# Patient Record
Sex: FEMALE | Race: OTHER | NOT HISPANIC OR LATINO | URBAN - METROPOLITAN AREA
[De-identification: names, ages, dates, MRNs, and addresses within clinical notes are randomized per-mention and may not be internally consistent; named-entity substitution may affect disease eponyms.]

---

## 2017-10-23 ENCOUNTER — EMERGENCY (EMERGENCY)
Facility: HOSPITAL | Age: 82
LOS: 1 days | Discharge: HOME | End: 2017-10-23
Admitting: INTERNAL MEDICINE
Payer: MEDICARE

## 2017-10-23 DIAGNOSIS — E86.0 DEHYDRATION: ICD-10-CM

## 2017-10-23 DIAGNOSIS — I10 ESSENTIAL (PRIMARY) HYPERTENSION: ICD-10-CM

## 2017-10-23 DIAGNOSIS — R19.7 DIARRHEA, UNSPECIFIED: ICD-10-CM

## 2017-10-23 DIAGNOSIS — N39.0 URINARY TRACT INFECTION, SITE NOT SPECIFIED: ICD-10-CM

## 2017-10-23 PROBLEM — Z00.00 ENCOUNTER FOR PREVENTIVE HEALTH EXAMINATION: Status: ACTIVE | Noted: 2017-10-23

## 2017-10-23 PROCEDURE — 74000: CPT | Mod: 26

## 2017-10-23 PROCEDURE — 99285 EMERGENCY DEPT VISIT HI MDM: CPT

## 2017-10-23 PROCEDURE — 93010 ELECTROCARDIOGRAM REPORT: CPT

## 2017-10-24 PROCEDURE — 96365 THER/PROPH/DIAG IV INF INIT: CPT

## 2017-10-24 PROCEDURE — 81003 URINALYSIS AUTO W/O SCOPE: CPT

## 2017-10-24 PROCEDURE — 85730 THROMBOPLASTIN TIME PARTIAL: CPT

## 2017-10-24 PROCEDURE — 99284 EMERGENCY DEPT VISIT MOD MDM: CPT | Mod: 25

## 2017-10-24 PROCEDURE — 85610 PROTHROMBIN TIME: CPT

## 2017-10-24 PROCEDURE — 80076 HEPATIC FUNCTION PANEL: CPT

## 2017-10-24 PROCEDURE — 87086 URINE CULTURE/COLONY COUNT: CPT

## 2017-10-24 PROCEDURE — 96367 TX/PROPH/DG ADDL SEQ IV INF: CPT

## 2017-10-24 PROCEDURE — 85027 COMPLETE CBC AUTOMATED: CPT

## 2017-10-24 PROCEDURE — 84484 ASSAY OF TROPONIN QUANT: CPT

## 2017-10-24 PROCEDURE — 82550 ASSAY OF CK (CPK): CPT

## 2017-10-24 PROCEDURE — 74018 RADEX ABDOMEN 1 VIEW: CPT

## 2017-10-24 PROCEDURE — 96361 HYDRATE IV INFUSION ADD-ON: CPT

## 2017-10-24 PROCEDURE — 83690 ASSAY OF LIPASE: CPT

## 2017-10-24 PROCEDURE — 82553 CREATINE MB FRACTION: CPT

## 2017-10-24 PROCEDURE — 80048 BASIC METABOLIC PNL TOTAL CA: CPT

## 2017-10-24 PROCEDURE — 93005 ELECTROCARDIOGRAM TRACING: CPT

## 2017-10-24 PROCEDURE — 82150 ASSAY OF AMYLASE: CPT

## 2017-10-27 ENCOUNTER — EMERGENCY (EMERGENCY)
Facility: HOSPITAL | Age: 82
LOS: 1 days | Discharge: HOME | End: 2017-10-27
Admitting: EMERGENCY MEDICINE
Payer: MEDICARE

## 2017-10-27 DIAGNOSIS — K52.9 NONINFECTIVE GASTROENTERITIS AND COLITIS, UNSPECIFIED: ICD-10-CM

## 2017-10-27 DIAGNOSIS — I10 ESSENTIAL (PRIMARY) HYPERTENSION: ICD-10-CM

## 2017-10-27 DIAGNOSIS — R19.7 DIARRHEA, UNSPECIFIED: ICD-10-CM

## 2017-10-27 PROCEDURE — 74177 CT ABD & PELVIS W/CONTRAST: CPT | Mod: 26

## 2017-10-27 PROCEDURE — 99284 EMERGENCY DEPT VISIT MOD MDM: CPT

## 2017-10-28 PROCEDURE — 85027 COMPLETE CBC AUTOMATED: CPT

## 2017-10-28 PROCEDURE — 99284 EMERGENCY DEPT VISIT MOD MDM: CPT | Mod: 25

## 2017-10-28 PROCEDURE — 80053 COMPREHEN METABOLIC PANEL: CPT

## 2017-10-28 PROCEDURE — 81003 URINALYSIS AUTO W/O SCOPE: CPT

## 2017-10-28 PROCEDURE — 96361 HYDRATE IV INFUSION ADD-ON: CPT

## 2017-10-28 PROCEDURE — 96365 THER/PROPH/DIAG IV INF INIT: CPT | Mod: XU

## 2017-10-28 PROCEDURE — 74177 CT ABD & PELVIS W/CONTRAST: CPT

## 2017-11-13 ENCOUNTER — EMERGENCY (EMERGENCY)
Facility: HOSPITAL | Age: 82
LOS: 1 days | Discharge: HOME | End: 2017-11-13
Admitting: EMERGENCY MEDICINE
Payer: MEDICARE

## 2017-11-13 DIAGNOSIS — R19.7 DIARRHEA, UNSPECIFIED: ICD-10-CM

## 2017-11-13 DIAGNOSIS — K64.9 UNSPECIFIED HEMORRHOIDS: ICD-10-CM

## 2017-11-13 DIAGNOSIS — I10 ESSENTIAL (PRIMARY) HYPERTENSION: ICD-10-CM

## 2017-11-13 DIAGNOSIS — R53.1 WEAKNESS: ICD-10-CM

## 2017-11-13 DIAGNOSIS — Z79.82 LONG TERM (CURRENT) USE OF ASPIRIN: ICD-10-CM

## 2017-11-13 DIAGNOSIS — Z79.899 OTHER LONG TERM (CURRENT) DRUG THERAPY: ICD-10-CM

## 2017-11-13 PROCEDURE — 82150 ASSAY OF AMYLASE: CPT

## 2017-11-13 PROCEDURE — 71045 X-RAY EXAM CHEST 1 VIEW: CPT

## 2017-11-13 PROCEDURE — 85610 PROTHROMBIN TIME: CPT

## 2017-11-13 PROCEDURE — 96360 HYDRATION IV INFUSION INIT: CPT

## 2017-11-13 PROCEDURE — 71010: CPT | Mod: 26

## 2017-11-13 PROCEDURE — 82272 OCCULT BLD FECES 1-3 TESTS: CPT

## 2017-11-13 PROCEDURE — 74020: CPT

## 2017-11-13 PROCEDURE — 83690 ASSAY OF LIPASE: CPT

## 2017-11-13 PROCEDURE — 85027 COMPLETE CBC AUTOMATED: CPT

## 2017-11-13 PROCEDURE — 85730 THROMBOPLASTIN TIME PARTIAL: CPT

## 2017-11-13 PROCEDURE — 74020: CPT | Mod: 26

## 2017-11-13 PROCEDURE — 99284 EMERGENCY DEPT VISIT MOD MDM: CPT | Mod: 25

## 2017-11-13 PROCEDURE — 99284 EMERGENCY DEPT VISIT MOD MDM: CPT

## 2017-11-13 PROCEDURE — 80053 COMPREHEN METABOLIC PANEL: CPT

## 2017-11-17 ENCOUNTER — INPATIENT (INPATIENT)
Facility: HOSPITAL | Age: 82
LOS: 8 days | Discharge: HOME | DRG: 372 | End: 2017-11-26
Attending: INTERNAL MEDICINE | Admitting: INTERNAL MEDICINE
Payer: MEDICARE

## 2017-11-17 VITALS
OXYGEN SATURATION: 98 % | HEART RATE: 86 BPM | SYSTOLIC BLOOD PRESSURE: 149 MMHG | RESPIRATION RATE: 18 BRPM | DIASTOLIC BLOOD PRESSURE: 67 MMHG | TEMPERATURE: 99 F

## 2017-11-17 DIAGNOSIS — K52.9 NONINFECTIVE GASTROENTERITIS AND COLITIS, UNSPECIFIED: ICD-10-CM

## 2017-11-17 DIAGNOSIS — I10 ESSENTIAL (PRIMARY) HYPERTENSION: ICD-10-CM

## 2017-11-17 DIAGNOSIS — E78.5 HYPERLIPIDEMIA, UNSPECIFIED: ICD-10-CM

## 2017-11-17 DIAGNOSIS — Z29.9 ENCOUNTER FOR PROPHYLACTIC MEASURES, UNSPECIFIED: ICD-10-CM

## 2017-11-17 DIAGNOSIS — E87.6 HYPOKALEMIA: ICD-10-CM

## 2017-11-17 LAB
ALBUMIN SERPL ELPH-MCNC: 2.6 G/DL — LOW (ref 3.5–5)
ALP SERPL-CCNC: 46 U/L — SIGNIFICANT CHANGE UP (ref 40–120)
ALT FLD-CCNC: 13 U/L DA — SIGNIFICANT CHANGE UP (ref 10–60)
ANION GAP SERPL CALC-SCNC: 11 MMOL/L — SIGNIFICANT CHANGE UP (ref 5–17)
APTT BLD: 25.9 SEC — LOW (ref 27.5–37.4)
AST SERPL-CCNC: 15 U/L — SIGNIFICANT CHANGE UP (ref 10–40)
BASOPHILS # BLD AUTO: 0.1 K/UL — SIGNIFICANT CHANGE UP (ref 0–0.2)
BASOPHILS NFR BLD AUTO: 0.9 % — SIGNIFICANT CHANGE UP (ref 0–2)
BILIRUB SERPL-MCNC: 0.8 MG/DL — SIGNIFICANT CHANGE UP (ref 0.2–1.2)
BUN SERPL-MCNC: 8 MG/DL — SIGNIFICANT CHANGE UP (ref 7–18)
CALCIUM SERPL-MCNC: 8.6 MG/DL — SIGNIFICANT CHANGE UP (ref 8.4–10.5)
CHLORIDE SERPL-SCNC: 103 MMOL/L — SIGNIFICANT CHANGE UP (ref 96–108)
CO2 SERPL-SCNC: 26 MMOL/L — SIGNIFICANT CHANGE UP (ref 22–31)
CREAT SERPL-MCNC: 0.76 MG/DL — SIGNIFICANT CHANGE UP (ref 0.5–1.3)
EOSINOPHIL # BLD AUTO: 0.1 K/UL — SIGNIFICANT CHANGE UP (ref 0–0.5)
EOSINOPHIL NFR BLD AUTO: 1.9 % — SIGNIFICANT CHANGE UP (ref 0–6)
GLUCOSE SERPL-MCNC: 131 MG/DL — HIGH (ref 70–99)
HCT VFR BLD CALC: 37.9 % — SIGNIFICANT CHANGE UP (ref 34.5–45)
HGB BLD-MCNC: 12.4 G/DL — SIGNIFICANT CHANGE UP (ref 11.5–15.5)
INR BLD: 1.14 RATIO — SIGNIFICANT CHANGE UP (ref 0.88–1.16)
LACTATE SERPL-SCNC: 1.2 MMOL/L — SIGNIFICANT CHANGE UP (ref 0.7–2)
LIDOCAIN IGE QN: 39 U/L — LOW (ref 73–393)
LYMPHOCYTES # BLD AUTO: 1 K/UL — SIGNIFICANT CHANGE UP (ref 1–3.3)
LYMPHOCYTES # BLD AUTO: 14.7 % — SIGNIFICANT CHANGE UP (ref 13–44)
MAGNESIUM SERPL-MCNC: 1.9 MG/DL — SIGNIFICANT CHANGE UP (ref 1.6–2.6)
MCHC RBC-ENTMCNC: 30.5 PG — SIGNIFICANT CHANGE UP (ref 27–34)
MCHC RBC-ENTMCNC: 32.8 GM/DL — SIGNIFICANT CHANGE UP (ref 32–36)
MCV RBC AUTO: 92.9 FL — SIGNIFICANT CHANGE UP (ref 80–100)
MONOCYTES # BLD AUTO: 0.6 K/UL — SIGNIFICANT CHANGE UP (ref 0–0.9)
MONOCYTES NFR BLD AUTO: 9 % — SIGNIFICANT CHANGE UP (ref 2–14)
NEUTROPHILS # BLD AUTO: 5 K/UL — SIGNIFICANT CHANGE UP (ref 1.8–7.4)
NEUTROPHILS NFR BLD AUTO: 73.5 % — SIGNIFICANT CHANGE UP (ref 43–77)
PLATELET # BLD AUTO: 204 K/UL — SIGNIFICANT CHANGE UP (ref 150–400)
POTASSIUM SERPL-MCNC: 2.9 MMOL/L — CRITICAL LOW (ref 3.5–5.3)
POTASSIUM SERPL-SCNC: 2.9 MMOL/L — CRITICAL LOW (ref 3.5–5.3)
PROT SERPL-MCNC: 6.2 G/DL — SIGNIFICANT CHANGE UP (ref 6–8.3)
PROTHROM AB SERPL-ACNC: 12.5 SEC — SIGNIFICANT CHANGE UP (ref 9.8–12.7)
RBC # BLD: 4.07 M/UL — SIGNIFICANT CHANGE UP (ref 3.8–5.2)
RBC # FLD: 12.5 % — SIGNIFICANT CHANGE UP (ref 10.3–14.5)
SODIUM SERPL-SCNC: 140 MMOL/L — SIGNIFICANT CHANGE UP (ref 135–145)
WBC # BLD: 6.7 K/UL — SIGNIFICANT CHANGE UP (ref 3.8–10.5)
WBC # FLD AUTO: 6.7 K/UL — SIGNIFICANT CHANGE UP (ref 3.8–10.5)

## 2017-11-17 PROCEDURE — 99285 EMERGENCY DEPT VISIT HI MDM: CPT

## 2017-11-17 PROCEDURE — 93010 ELECTROCARDIOGRAM REPORT: CPT

## 2017-11-17 RX ORDER — POTASSIUM CHLORIDE 20 MEQ
10 PACKET (EA) ORAL
Qty: 0 | Refills: 0 | Status: COMPLETED | OUTPATIENT
Start: 2017-11-17 | End: 2017-11-17

## 2017-11-17 RX ORDER — METRONIDAZOLE 500 MG
500 TABLET ORAL EVERY 8 HOURS
Qty: 0 | Refills: 0 | Status: DISCONTINUED | OUTPATIENT
Start: 2017-11-18 | End: 2017-11-22

## 2017-11-17 RX ORDER — METRONIDAZOLE 500 MG
500 TABLET ORAL ONCE
Qty: 0 | Refills: 0 | Status: COMPLETED | OUTPATIENT
Start: 2017-11-17 | End: 2017-11-17

## 2017-11-17 RX ORDER — POTASSIUM CHLORIDE 20 MEQ
40 PACKET (EA) ORAL EVERY 4 HOURS
Qty: 0 | Refills: 0 | Status: COMPLETED | OUTPATIENT
Start: 2017-11-17 | End: 2017-11-18

## 2017-11-17 RX ORDER — SODIUM CHLORIDE 9 MG/ML
1000 INJECTION INTRAMUSCULAR; INTRAVENOUS; SUBCUTANEOUS
Qty: 0 | Refills: 0 | Status: DISCONTINUED | OUTPATIENT
Start: 2017-11-17 | End: 2017-11-17

## 2017-11-17 RX ORDER — PANTOPRAZOLE SODIUM 20 MG/1
40 TABLET, DELAYED RELEASE ORAL
Qty: 0 | Refills: 0 | Status: DISCONTINUED | OUTPATIENT
Start: 2017-11-17 | End: 2017-11-22

## 2017-11-17 RX ORDER — DEXTROSE MONOHYDRATE, SODIUM CHLORIDE, AND POTASSIUM CHLORIDE 50; .745; 4.5 G/1000ML; G/1000ML; G/1000ML
1000 INJECTION, SOLUTION INTRAVENOUS
Qty: 0 | Refills: 0 | Status: COMPLETED | OUTPATIENT
Start: 2017-11-17 | End: 2017-11-18

## 2017-11-17 RX ORDER — METRONIDAZOLE 500 MG
TABLET ORAL
Qty: 0 | Refills: 0 | Status: DISCONTINUED | OUTPATIENT
Start: 2017-11-17 | End: 2017-11-22

## 2017-11-17 RX ORDER — SODIUM CHLORIDE 9 MG/ML
3 INJECTION INTRAMUSCULAR; INTRAVENOUS; SUBCUTANEOUS ONCE
Qty: 0 | Refills: 0 | Status: COMPLETED | OUTPATIENT
Start: 2017-11-17 | End: 2017-11-17

## 2017-11-17 RX ORDER — ONDANSETRON 8 MG/1
4 TABLET, FILM COATED ORAL ONCE
Qty: 0 | Refills: 0 | Status: COMPLETED | OUTPATIENT
Start: 2017-11-17 | End: 2017-11-17

## 2017-11-17 RX ADMIN — ONDANSETRON 4 MILLIGRAM(S): 8 TABLET, FILM COATED ORAL at 18:46

## 2017-11-17 RX ADMIN — SODIUM CHLORIDE 125 MILLILITER(S): 9 INJECTION INTRAMUSCULAR; INTRAVENOUS; SUBCUTANEOUS at 15:46

## 2017-11-17 RX ADMIN — Medication 100 MILLIEQUIVALENT(S): at 21:09

## 2017-11-17 RX ADMIN — Medication 40 MILLIEQUIVALENT(S): at 23:00

## 2017-11-17 RX ADMIN — Medication 100 MILLIEQUIVALENT(S): at 21:00

## 2017-11-17 RX ADMIN — DEXTROSE MONOHYDRATE, SODIUM CHLORIDE, AND POTASSIUM CHLORIDE 75 MILLILITER(S): 50; .745; 4.5 INJECTION, SOLUTION INTRAVENOUS at 23:01

## 2017-11-17 RX ADMIN — Medication 100 MILLIEQUIVALENT(S): at 18:47

## 2017-11-17 RX ADMIN — Medication 30 MILLILITER(S): at 18:46

## 2017-11-17 RX ADMIN — Medication 40 MILLIEQUIVALENT(S): at 19:34

## 2017-11-17 RX ADMIN — Medication 100 MILLIGRAM(S): at 21:59

## 2017-11-17 RX ADMIN — SODIUM CHLORIDE 3 MILLILITER(S): 9 INJECTION INTRAMUSCULAR; INTRAVENOUS; SUBCUTANEOUS at 15:48

## 2017-11-17 NOTE — H&P ADULT - NSHPREVIEWOFSYSTEMS_GEN_ALL_CORE
CONSTITUTIONAL: No fever, weight loss, or fatigue  EYES: No eye pain, visual disturbances, or discharge  ENMT:  No difficulty hearing, tinnitus, vertigo; No sinus or throat pain  NECK: No pain or stiffness  RESPIRATORY: No cough, wheezing, chills or hemoptysis; No shortness of breath  CARDIOVASCULAR: No chest pain, palpitations, dizziness, or leg swelling  GASTROINTESTINAL: No abdominal or epigastric pain. No nausea, vomiting, or hematemesis; No constipation. No melena or hematochezia. DIARRHEA  GENITOURINARY: No dysuria, frequency, hematuria, or incontinence  NEUROLOGICAL: No headaches, memory loss, loss of strength, numbness, or tremors  SKIN: No itching, burning, rashes, or lesions   LYMPH NODES: No enlarged glands  ENDOCRINE: No heat or cold intolerance; No hair loss  MUSCULOSKELETAL: No joint pain or swelling; No muscle, back, or extremity pain  PSYCHIATRIC: No depression, anxiety, mood swings, or difficulty sleeping

## 2017-11-17 NOTE — ED PROVIDER NOTE - OBJECTIVE STATEMENT
83 y/o F pt with no PMHx and no PSHx BIB son to ED with diarrhea x6 weeks as well as vomiting x2-3 days. As per pt's son, pt was placed on Abx's to treat a UTI x6 weeks ago with pt subsequently developing diarrhea; pt was informed diarrhea was likely due to Abx's use at the time. Pt's son reports pt was seen by PMD several days ago and switched to Vancomycin, with pt subsequently experiencing vomiting with continued diarrhea; pt was instructed to visit ED for further evaluation if sx's worsened. Pt's son notes pt with occasional blood in diarrhea as well. Pt's son states pt recently had a CT Abd/Pel and labs performed at [facility] (11/13/2017). Pt denies fever, chills, abd pain, or any other complaints. Pt also denies taking any medication daily, Hx of abdominal surgeries in the past, recent travel (outside of the [place] or having had similar sx's in the past. NKDA. PMD: Dr. Carroll.

## 2017-11-17 NOTE — H&P ADULT - NSHPPHYSICALEXAM_GEN_ALL_CORE
T(C): 37.2 (17 Nov 2017 18:42), Max: 37.2 (17 Nov 2017 18:42)  T(F): 99 (17 Nov 2017 18:42), Max: 99 (17 Nov 2017 18:42)  HR: 86 (17 Nov 2017 18:42) (86 - 86)  BP: 149/67 (17 Nov 2017 18:42) (149/67 - 149/67)  RR: 18 (17 Nov 2017 18:42) (18 - 18)  SpO2: 98% (17 Nov 2017 18:42) (98% - 98%)

## 2017-11-17 NOTE — ED PROVIDER NOTE - PROGRESS NOTE DETAILS
Reviewed CT from [facility]/w Dr. Carroll plan to admit pt for inpatient w/u. Left page for Dr. LOUIS Combs awaiting callback.

## 2017-11-17 NOTE — ED PROVIDER NOTE - CARE PLAN
Principal Discharge DX:	Colitis Principal Discharge DX:	Colitis  Secondary Diagnosis:	Hypokalemia, gastrointestinal losses

## 2017-11-17 NOTE — H&P ADULT - HISTORY OF PRESENT ILLNESS
84 Samoan F from home, ambulates independently, w/ no PMH/PSH p/w watery diarrhea x 5 weeks. Patient has been seen at ED of [facility] twice; 10/27 found to have asymptomatic UTI and prescribed Ciprofloxacin/Flagyl, completed the ABx; 11/13 prescribed Imodium. Patient also reports CT done showing ulcer however currently has no complaints of abdominal pain. The diarrhea is nonpainful, frequent > 5 times, sometimes contains blood but ultimately water, foul smelling yesterday but appropriate odor and color today. Patient reports 9 lbs weight loss x 5 weeks w/ loss of appetite. Diarrhea unrelated to specific foot intake. Patient denies any recent sick contacts, travel, F/chills, CP, SOB, LE swelling, and weakness.    ED Course: Vital signs WNLs, BMP remarkable for hypokalemia - treating w/ IV 84 Turkish F from home, ambulates independently, w/ no PMH HTN, HLD, Chronic LBBB p/w watery diarrhea x 5 weeks. Prior to onset patient had UTI treated w/ Ceftin. Patient has been seen at ED of [facility] twice; 10/27 found to have colitis 2/2 CT findings and prescribed Ciprofloxacin/Flagyl, completed the ABx; 11/13 prescribed Imodium. The diarrhea is nonpainful, frequent > 5 times, sometimes contains blood but ultimately water, foul smelling yesterday but appropriate odor and color today. Patient reports 9 lbs weight loss x 5 weeks w/ loss of appetite. Diarrhea unrelated to specific foot intake. Patient recently prescribed PO Vancomycin 11/15 and was not able to tolerate it. Patient denies any recent sick contacts, travel, F/chills, CP, SOB, LE swelling, and weakness.  In the ED, vital signs WNLs, BMP remarkable for hypokalemia 2.9 likely 2/2 dehydration - treating w/ IV and PO replacement, EKG - admitting patient for w/u of watery diarrhea. 84 Belarusian F from home, ambulates independently, w/ no PMH HTN, HLD, Chronic LBBB p/w watery diarrhea x 5 weeks. Prior to onset patient had UTI treated w/ Ceftin. Patient has been seen at ED of [facility] twice; 10/27 found to have colitis 2/2 CT findings and prescribed Ciprofloxacin/Flagyl, completed the ABx; 11/13 prescribed Imodium. The diarrhea is nonpainful, frequent > 5 times, sometimes contains blood but ultimately water, foul smelling yesterday but appropriate odor and color today. Patient reports 9 lbs weight loss x 5 weeks w/ loss of appetite. Diarrhea unrelated to specific food intake. Patient recently prescribed PO Vancomycin 11/15 and was not able to tolerate it. Patient denies any recent sick contacts, travel, F/chills, CP, SOB, LE swelling, and weakness.  In the ED, vital signs WNLs, BMP remarkable for hypokalemia 2.9 likely 2/2 dehydration - treating w/ IV and PO replacement - admitting patient for w/u of watery diarrhea.

## 2017-11-17 NOTE — ED PROVIDER NOTE - MEDICAL DECISION MAKING DETAILS
85 y/o F pt presents with chronic diarrhea, now being treated for presumed C. diff. Will do labs, send for C. diff, and contact PMD.

## 2017-11-17 NOTE — ED ADULT NURSE NOTE - OBJECTIVE STATEMENT
C/O DIARRHEA X5 WEEKS . DENIES VOMITING,ABDOMINAL PAIN,CHEST PAIN  .IV STARTED AS ORDERED, NO VOMITINMG NOTED IN ED.

## 2017-11-17 NOTE — H&P ADULT - ASSESSMENT
84 F PMH HTN, HLD, Chronic LBBB p/w watery diarrhea x 5 weeks associated w/ 10 lbs weight loss - prior CT findings showed colitis but failed outpatient antibiotics Ciprofloxacin, Flagyl, and recently PO Vancomycin - admitting patient for w/u persistent watery diarrhea w/ electrolyte abnormalities

## 2017-11-17 NOTE — H&P ADULT - PROBLEM SELECTOR PLAN 1
Recent ABx for UTI precedes diarrhea and also failed outpatient ABx (Cipro/Flagyl) when symptomatic, patient reports foul smelling water diarrhea ass. w/ weight loss  - R/o C. diff   - Sending stool studies; calprotectin, culture, ova/parasites Recent ABx for UTI precedes diarrhea and also failed outpatient ABx (Cipro/Flagyl) and when symptomatic, patient reports foul smelling water diarrhea ass. w/ weight loss  - Patient unable to tolerate PO Vancomycin outpatient  - R/o C. diff   ***Sending stool studies; fecal calprotectin, culture, ova/parasites, and C. diff  ABx - IV Flagyl

## 2017-11-17 NOTE — H&P ADULT - NSHPLABSRESULTS_GEN_ALL_CORE
CBC Full  -  ( 17 Nov 2017 15:49 )  WBC Count : 6.7 K/uL  Hemoglobin : 12.4 g/dL  Hematocrit : 37.9 %  Platelet Count - Automated : 204 K/uL  Mean Cell Volume : 92.9 fl  Mean Cell Hemoglobin : 30.5 pg  Mean Cell Hemoglobin Concentration : 32.8 gm/dL  Auto Neutrophil # : 5.0 K/uL  Auto Lymphocyte # : 1.0 K/uL  Auto Monocyte # : 0.6 K/uL  Auto Eosinophil # : 0.1 K/uL  Auto Basophil # : 0.1 K/uL  Auto Neutrophil % : 73.5 %  Auto Lymphocyte % : 14.7 %  Auto Monocyte % : 9.0 %  Auto Eosinophil % : 1.9 %  Auto Basophil % : 0.9 %    11-17    140  |  103  |  8   ----------------------------<  131<H>  2.9<LL>   |  26  |  0.76    Ca    8.6      17 Nov 2017 15:49  Mg     1.9     11-17    TPro  6.2  /  Alb  2.6<L>  /  TBili  0.8  /  DBili  x   /  AST  15  /  ALT  13  /  AlkPhos  46  11-17    PT/INR - ( 17 Nov 2017 15:49 )   PT: 12.5 sec;   INR: 1.14 ratio      PTT - ( 17 Nov 2017 15:49 )  PTT:25.9 sec    RADIOLOGY & ADDITIONAL STUDIES (The following images were personally reviewed):    EKG CBC Full  -  ( 17 Nov 2017 15:49 )  WBC Count : 6.7 K/uL  Hemoglobin : 12.4 g/dL  Hematocrit : 37.9 %  Platelet Count - Automated : 204 K/uL  Mean Cell Volume : 92.9 fl  Mean Cell Hemoglobin : 30.5 pg  Mean Cell Hemoglobin Concentration : 32.8 gm/dL  Auto Neutrophil # : 5.0 K/uL  Auto Lymphocyte # : 1.0 K/uL  Auto Monocyte # : 0.6 K/uL  Auto Eosinophil # : 0.1 K/uL  Auto Basophil # : 0.1 K/uL  Auto Neutrophil % : 73.5 %  Auto Lymphocyte % : 14.7 %  Auto Monocyte % : 9.0 %  Auto Eosinophil % : 1.9 %  Auto Basophil % : 0.9 %    11-17    140  |  103  |  8   ----------------------------<  131<H>  2.9<LL>   |  26  |  0.76    Ca    8.6      17 Nov 2017 15:49  Mg     1.9     11-17    TPro  6.2  /  Alb  2.6<L>  /  TBili  0.8  /  DBili  x   /  AST  15  /  ALT  13  /  AlkPhos  46  11-17    PT/INR - ( 17 Nov 2017 15:49 )   PT: 12.5 sec;   INR: 1.14 ratio      PTT - ( 17 Nov 2017 15:49 )  PTT:25.9 sec

## 2017-11-17 NOTE — ED ADULT NURSE REASSESSMENT NOTE - NS ED NURSE REASSESS COMMENT FT1
RESTING IN STRETCHER, NO COMPLAINTS AT PRESENT. NO VOMITING NOTED.ABDOMEN SOFT NON TENDER +BS.IV NS INFUSING WELL VIA LEFT ARM , NO REDNESS, SWELLING NOTED HEPLOCK SITE,

## 2017-11-17 NOTE — H&P ADULT - PROBLEM SELECTOR PLAN 2
Likely 2/2 to chronic diarrhea  - Staring IVF D51/2NS 75 cc x 12 hours and Clear Liquid Diet  - Repleting w/ 10 IV x 3 and 40 PO x 3  ***F/u BMP in AM

## 2017-11-18 LAB
ANION GAP SERPL CALC-SCNC: 9 MMOL/L — SIGNIFICANT CHANGE UP (ref 5–17)
APPEARANCE UR: CLEAR — SIGNIFICANT CHANGE UP
BACTERIA # UR AUTO: ABNORMAL /HPF
BILIRUB UR-MCNC: NEGATIVE — SIGNIFICANT CHANGE UP
BUN SERPL-MCNC: 8 MG/DL — SIGNIFICANT CHANGE UP (ref 7–18)
C DIFF BY PCR RESULT: DETECTED
C DIFF TOX GENS STL QL NAA+PROBE: SIGNIFICANT CHANGE UP
CALCIUM SERPL-MCNC: 8.2 MG/DL — LOW (ref 8.4–10.5)
CHLORIDE SERPL-SCNC: 110 MMOL/L — HIGH (ref 96–108)
CHOLEST SERPL-MCNC: 131 MG/DL — SIGNIFICANT CHANGE UP (ref 10–199)
CO2 SERPL-SCNC: 24 MMOL/L — SIGNIFICANT CHANGE UP (ref 22–31)
COD CRY URNS QL: ABNORMAL /HPF
COLOR SPEC: YELLOW — SIGNIFICANT CHANGE UP
COMMENT - URINE: SIGNIFICANT CHANGE UP
CREAT SERPL-MCNC: 0.61 MG/DL — SIGNIFICANT CHANGE UP (ref 0.5–1.3)
DIFF PNL FLD: ABNORMAL
EPI CELLS # UR: ABNORMAL /HPF
GLUCOSE SERPL-MCNC: 116 MG/DL — HIGH (ref 70–99)
GLUCOSE UR QL: NEGATIVE — SIGNIFICANT CHANGE UP
HBA1C BLD-MCNC: 6.1 % — HIGH (ref 4–5.6)
HCT VFR BLD CALC: 33.1 % — LOW (ref 34.5–45)
HDLC SERPL-MCNC: 37 MG/DL — LOW (ref 40–125)
HGB BLD-MCNC: 10.8 G/DL — LOW (ref 11.5–15.5)
HYALINE CASTS # UR AUTO: ABNORMAL /LPF
KETONES UR-MCNC: ABNORMAL
LEUKOCYTE ESTERASE UR-ACNC: ABNORMAL
LIPID PNL WITH DIRECT LDL SERPL: 76 MG/DL — SIGNIFICANT CHANGE UP
MAGNESIUM SERPL-MCNC: 1.9 MG/DL — SIGNIFICANT CHANGE UP (ref 1.6–2.6)
MCHC RBC-ENTMCNC: 31.3 PG — SIGNIFICANT CHANGE UP (ref 27–34)
MCHC RBC-ENTMCNC: 32.8 GM/DL — SIGNIFICANT CHANGE UP (ref 32–36)
MCV RBC AUTO: 95.4 FL — SIGNIFICANT CHANGE UP (ref 80–100)
NITRITE UR-MCNC: POSITIVE
PH UR: 6 — SIGNIFICANT CHANGE UP (ref 5–8)
PHOSPHATE SERPL-MCNC: 1.4 MG/DL — LOW (ref 2.5–4.5)
PLATELET # BLD AUTO: 178 K/UL — SIGNIFICANT CHANGE UP (ref 150–400)
POTASSIUM SERPL-MCNC: 3.8 MMOL/L — SIGNIFICANT CHANGE UP (ref 3.5–5.3)
POTASSIUM SERPL-SCNC: 3.8 MMOL/L — SIGNIFICANT CHANGE UP (ref 3.5–5.3)
PROT UR-MCNC: 30 MG/DL
RBC # BLD: 3.47 M/UL — LOW (ref 3.8–5.2)
RBC # FLD: 12.6 % — SIGNIFICANT CHANGE UP (ref 10.3–14.5)
RBC CASTS # UR COMP ASSIST: ABNORMAL /HPF (ref 0–2)
SODIUM SERPL-SCNC: 143 MMOL/L — SIGNIFICANT CHANGE UP (ref 135–145)
SP GR SPEC: 1.02 — SIGNIFICANT CHANGE UP (ref 1.01–1.02)
TOTAL CHOLESTEROL/HDL RATIO MEASUREMENT: 3.5 RATIO — SIGNIFICANT CHANGE UP (ref 3.3–7.1)
TRIGL SERPL-MCNC: 91 MG/DL — SIGNIFICANT CHANGE UP (ref 10–149)
TSH SERPL-MCNC: 2.72 UU/ML — SIGNIFICANT CHANGE UP (ref 0.34–4.82)
UROBILINOGEN FLD QL: NEGATIVE — SIGNIFICANT CHANGE UP
VIT B12 SERPL-MCNC: 661 PG/ML — SIGNIFICANT CHANGE UP (ref 243–894)
WBC # BLD: 5.4 K/UL — SIGNIFICANT CHANGE UP (ref 3.8–10.5)
WBC # FLD AUTO: 5.4 K/UL — SIGNIFICANT CHANGE UP (ref 3.8–10.5)
WBC UR QL: ABNORMAL /HPF (ref 0–5)

## 2017-11-18 RX ORDER — HEPARIN SODIUM 5000 [USP'U]/ML
5000 INJECTION INTRAVENOUS; SUBCUTANEOUS EVERY 12 HOURS
Qty: 0 | Refills: 0 | Status: DISCONTINUED | OUTPATIENT
Start: 2017-11-18 | End: 2017-11-26

## 2017-11-18 RX ORDER — DEXTROSE MONOHYDRATE, SODIUM CHLORIDE, AND POTASSIUM CHLORIDE 50; .745; 4.5 G/1000ML; G/1000ML; G/1000ML
1000 INJECTION, SOLUTION INTRAVENOUS
Qty: 0 | Refills: 0 | Status: DISCONTINUED | OUTPATIENT
Start: 2017-11-18 | End: 2017-11-19

## 2017-11-18 RX ORDER — LACTOBACILLUS ACIDOPHILUS 100MM CELL
1 CAPSULE ORAL
Qty: 0 | Refills: 0 | Status: DISCONTINUED | OUTPATIENT
Start: 2017-11-18 | End: 2017-11-26

## 2017-11-18 RX ORDER — DEXTROSE MONOHYDRATE, SODIUM CHLORIDE, AND POTASSIUM CHLORIDE 50; .745; 4.5 G/1000ML; G/1000ML; G/1000ML
1000 INJECTION, SOLUTION INTRAVENOUS
Qty: 0 | Refills: 0 | Status: DISCONTINUED | OUTPATIENT
Start: 2017-11-18 | End: 2017-11-18

## 2017-11-18 RX ADMIN — DEXTROSE MONOHYDRATE, SODIUM CHLORIDE, AND POTASSIUM CHLORIDE 75 MILLILITER(S): 50; .745; 4.5 INJECTION, SOLUTION INTRAVENOUS at 17:06

## 2017-11-18 RX ADMIN — PANTOPRAZOLE SODIUM 40 MILLIGRAM(S): 20 TABLET, DELAYED RELEASE ORAL at 07:11

## 2017-11-18 RX ADMIN — Medication 40 MILLIEQUIVALENT(S): at 02:06

## 2017-11-18 RX ADMIN — DEXTROSE MONOHYDRATE, SODIUM CHLORIDE, AND POTASSIUM CHLORIDE 75 MILLILITER(S): 50; .745; 4.5 INJECTION, SOLUTION INTRAVENOUS at 13:23

## 2017-11-18 RX ADMIN — HEPARIN SODIUM 5000 UNIT(S): 5000 INJECTION INTRAVENOUS; SUBCUTANEOUS at 17:06

## 2017-11-18 RX ADMIN — Medication 100 MILLIGRAM(S): at 21:34

## 2017-11-18 RX ADMIN — Medication 1 TABLET(S): at 17:06

## 2017-11-18 RX ADMIN — Medication 100 MILLIGRAM(S): at 07:10

## 2017-11-18 RX ADMIN — Medication 100 MILLIGRAM(S): at 13:23

## 2017-11-19 LAB
24R-OH-CALCIDIOL SERPL-MCNC: 34.1 NG/ML — SIGNIFICANT CHANGE UP (ref 30–80)
ANION GAP SERPL CALC-SCNC: 8 MMOL/L — SIGNIFICANT CHANGE UP (ref 5–17)
BUN SERPL-MCNC: 6 MG/DL — LOW (ref 7–18)
CALCIUM SERPL-MCNC: 8.1 MG/DL — LOW (ref 8.4–10.5)
CEA SERPL-MCNC: 1.2 NG/ML — SIGNIFICANT CHANGE UP (ref 0–3.8)
CHLORIDE SERPL-SCNC: 111 MMOL/L — HIGH (ref 96–108)
CO2 SERPL-SCNC: 25 MMOL/L — SIGNIFICANT CHANGE UP (ref 22–31)
CREAT SERPL-MCNC: 0.66 MG/DL — SIGNIFICANT CHANGE UP (ref 0.5–1.3)
FERRITIN SERPL-MCNC: 377 NG/ML — HIGH (ref 15–150)
GLUCOSE SERPL-MCNC: 127 MG/DL — HIGH (ref 70–99)
HCT VFR BLD CALC: 33.6 % — LOW (ref 34.5–45)
HGB BLD-MCNC: 10.9 G/DL — LOW (ref 11.5–15.5)
IRON SATN MFR SERPL: 39 % — SIGNIFICANT CHANGE UP (ref 15–50)
IRON SATN MFR SERPL: 52 UG/DL — SIGNIFICANT CHANGE UP (ref 40–150)
MCHC RBC-ENTMCNC: 31.2 PG — SIGNIFICANT CHANGE UP (ref 27–34)
MCHC RBC-ENTMCNC: 32.5 GM/DL — SIGNIFICANT CHANGE UP (ref 32–36)
MCV RBC AUTO: 96.2 FL — SIGNIFICANT CHANGE UP (ref 80–100)
OB PNL STL: NEGATIVE — SIGNIFICANT CHANGE UP
PLATELET # BLD AUTO: 186 K/UL — SIGNIFICANT CHANGE UP (ref 150–400)
POTASSIUM SERPL-MCNC: 4 MMOL/L — SIGNIFICANT CHANGE UP (ref 3.5–5.3)
POTASSIUM SERPL-SCNC: 4 MMOL/L — SIGNIFICANT CHANGE UP (ref 3.5–5.3)
RBC # BLD: 3.49 M/UL — LOW (ref 3.8–5.2)
RBC # FLD: 12.8 % — SIGNIFICANT CHANGE UP (ref 10.3–14.5)
SODIUM SERPL-SCNC: 144 MMOL/L — SIGNIFICANT CHANGE UP (ref 135–145)
TIBC SERPL-MCNC: 133 UG/DL — LOW (ref 250–450)
UIBC SERPL-MCNC: 81 UG/DL — LOW (ref 110–370)
WBC # BLD: 4.9 K/UL — SIGNIFICANT CHANGE UP (ref 3.8–10.5)
WBC # FLD AUTO: 4.9 K/UL — SIGNIFICANT CHANGE UP (ref 3.8–10.5)

## 2017-11-19 RX ORDER — DEXTROSE MONOHYDRATE, SODIUM CHLORIDE, AND POTASSIUM CHLORIDE 50; .745; 4.5 G/1000ML; G/1000ML; G/1000ML
1000 INJECTION, SOLUTION INTRAVENOUS
Qty: 0 | Refills: 0 | Status: DISCONTINUED | OUTPATIENT
Start: 2017-11-19 | End: 2017-11-26

## 2017-11-19 RX ORDER — VANCOMYCIN HCL 1 G
250 VIAL (EA) INTRAVENOUS EVERY 6 HOURS
Qty: 0 | Refills: 0 | Status: DISCONTINUED | OUTPATIENT
Start: 2017-11-19 | End: 2017-11-26

## 2017-11-19 RX ADMIN — Medication 100 MILLIGRAM(S): at 21:40

## 2017-11-19 RX ADMIN — Medication 250 MILLIGRAM(S): at 10:18

## 2017-11-19 RX ADMIN — Medication 1 TABLET(S): at 17:21

## 2017-11-19 RX ADMIN — Medication 1 TABLET(S): at 08:27

## 2017-11-19 RX ADMIN — Medication 250 MILLIGRAM(S): at 17:21

## 2017-11-19 RX ADMIN — Medication 100 MILLIGRAM(S): at 13:34

## 2017-11-19 RX ADMIN — Medication 100 MILLIGRAM(S): at 05:16

## 2017-11-19 RX ADMIN — PANTOPRAZOLE SODIUM 40 MILLIGRAM(S): 20 TABLET, DELAYED RELEASE ORAL at 05:17

## 2017-11-19 RX ADMIN — DEXTROSE MONOHYDRATE, SODIUM CHLORIDE, AND POTASSIUM CHLORIDE 75 MILLILITER(S): 50; .745; 4.5 INJECTION, SOLUTION INTRAVENOUS at 12:22

## 2017-11-19 RX ADMIN — HEPARIN SODIUM 5000 UNIT(S): 5000 INJECTION INTRAVENOUS; SUBCUTANEOUS at 17:21

## 2017-11-19 RX ADMIN — HEPARIN SODIUM 5000 UNIT(S): 5000 INJECTION INTRAVENOUS; SUBCUTANEOUS at 05:17

## 2017-11-19 RX ADMIN — Medication 250 MILLIGRAM(S): at 12:22

## 2017-11-19 RX ADMIN — Medication 1 TABLET(S): at 12:22

## 2017-11-19 NOTE — CONSULT NOTE ADULT - ASSESSMENT
84 Bruneian F from home, ambulates independently, w/ no PMH HTN, HLD, Chronic LBBB p/w watery diarrhea x 5 weeks. Prior to onset patient had UTI treated w/ Ceftin. Patient has been seen at ED of [facility] twice; 10/27 found to have colitis 2/2 CT findings and prescribed Ciprofloxacin/Flagyl, completed the ABx; 11/13 prescribed Imodium.  Patient was admitted for colitis and was started on IV Flagyl.  Vancomycin solution was added PO.

## 2017-11-19 NOTE — CONSULT NOTE ADULT - PROBLEM SELECTOR RECOMMENDATION 9
1- IV hydration.  2- Continue IV Flagyl.  3- Continue PO vancomycin.  4- Follow urine culture.  5- CBC & BMP follow up.

## 2017-11-19 NOTE — CONSULT NOTE ADULT - PROBLEM SELECTOR RECOMMENDATION 2
1- Monitor Blood pressure closely.  2- Blood pressure control.  3- BP. meds as per cardiology and primary care team.

## 2017-11-19 NOTE — CONSULT NOTE ADULT - SUBJECTIVE AND OBJECTIVE BOX
HPI:  84 Yoruba F from home, ambulates independently, w/ no PMH HTN, HLD, Chronic LBBB p/w watery diarrhea x 5 weeks. Prior to onset patient had UTI treated w/ Ceftin. Patient has been seen at ED of [facility] twice; 10/27 found to have colitis 2/2 CT findings and prescribed Ciprofloxacin/Flagyl, completed the ABx;  prescribed Imodium. The diarrhea is nonpainful, frequent > 5 times, sometimes contains blood but ultimately water, foul smelling yesterday but appropriate odor and color today. Patient reports 9 lbs weight loss x 5 weeks w/ loss of appetite. Diarrhea unrelated to specific food intake. Patient recently prescribed PO Vancomycin 11/15 and was not able to tolerate it. Patient denies any recent sick contacts, travel, F/chills, CP, SOB, LE swelling, and weakness.  In the ED, vital signs WNLs, BMP remarkable for hypokalemia 2.9 likely 2/2 dehydration - treating w/ IV and PO replacement - admitting patient for w/u of watery diarrhea. (2017 18:23)      PAST MEDICAL & SURGICAL HISTORY:  No pertinent past medical history  No significant past surgical history      No Known Allergies      dextrose 5% + sodium chloride 0.45% with potassium chloride 20 mEq/L 1000 milliLiter(s) IV Continuous <Continuous>  heparin  Injectable 5000 Unit(s) SubCutaneous every 12 hours  lactobacillus acidophilus 1 Tablet(s) Oral three times a day with meals  metroNIDAZOLE  IVPB 500 milliGRAM(s) IV Intermittent every 8 hours  metroNIDAZOLE  IVPB      pantoprazole    Tablet 40 milliGRAM(s) Oral before breakfast  vancomycin    Solution 250 milliGRAM(s) Oral every 6 hours      Social Hx:    FAMILY HISTORY:  No pertinent family history in first degree relatives        ROS  [  ] UNABLE TO ELICIT    General:  [  ] None  [  ] Fever  [  ] Chills  [ x ] Malaise    Skin:  [ x ] None [  ] Rash  [  ] Wound  [  ] Ulcer    HEENT:  [ x ] None  [  ] Sore Throat  [  ] Nasal congestion/ runny nose  [  ] Photophobia [  ] Neck pain      Chest:  [ x ] None   [  ] SOB  [  ] Cough  [  ] None    Cardiovascular:   [ x ] None  [  ] CP  [  ] Palpitation    Gastrointestinal:  [  ] None  [  ] Abd pain   [  ] Nausea    [  ] Vomiting   [ x ] Diarrhea	     Genitourinary:  [ x ] None [  ] Polyuria   [  ] Urgency  [  ] Frequency  [  ] Dysuria    [  ]  Hematuria       Musculoskeletal:  [ x ] None [  ] Back Pain	[  ] Body aches  [  ] Joint pain    Neurological: [  ] None [  ]Dizziness  [  ]Visual Disturbance  [  ]Headaches   [ x ] Weakness      PHYSICAL EXAM:    Vital Signs Last 24 Hrs  T(C): 36.1 (2017 14:49), Max: 36.9 (2017 06:11)  T(F): 97 (2017 14:49), Max: 98.4 (2017 06:11)  HR: 71 (2017 14:49) (71 - 73)  BP: 114/54 (2017 14:49) (114/54 - 123/50)  BP(mean): --  RR: 18 (2017 14:49) (16 - 18)  SpO2: 98% (2017 14:49) (93% - 98%)    Constitutional:    HEENT: [ x ] Wnl  [  ] Pharyngeal congestion    Neck:  [ x ] Supple  [  ]Lymphadenopathy  [  ] No JVD   [  ] JVD  [  ] Masses   [  ] WNL    CHEST/Respiratory:  [ x ]Clear to auscultation  [  ] Rales   [  ] Rhonchi   [  ] Wheezing     [  ] Chest Tenderness      Cardiovascular:  [ x ] Reg S1 S2   [  ] Irreg S1 S2   [  ]No Murmur  [  ] +ve Murmurs  [  ]Systolic [  ]Diastolic      Abdomen:  [ x ] Soft  [ x ] No tendrerness  [  ] Tenderness  [  ] Organomegaly  [  ] ABD Distention  [  ] Rigidity                       [ x ] No Regidity                       [ x ] No Rebound Tenderness  [  ] No Guarding Rigidity  [  ] Rebound Tenderness[  ] Guarding Rigidity                          [ x ]  +ve Bowel Sounds  [  ] Decreased Bowel Sounds    [  ] Absent Bowel Sounds                            Extremities: [ x ] No edema [  ] Edema  [  ] Clubbing   [  ] Cyanosis                         [ x ] No Tender Calf muscles  [  ] Tender Calf muscles                        [ x ] Palpable peripheral pulses    Neurological: [ x ] Awake  [ x ] Alert  [ x ] Oriented  x  3                           [  ] Confused  [  ] Drowzy  [  ] repond to painful stimuli  [  ] Unresponsive    Skin:  [ x ] Intact [  ] Redness [  ] Thrombophlebitis  [  ] Rashes  [  ] Dry  [  ] Ulcers    Ortho:  [  ] Joint Swelling  [  ] Joint erythema [  ] Joint tenderness                [  ] Increased temp. to touch  [ x ] DJD [  ] WNL      LABS/DIAGNOSTIC TESTS                          10.9   4.9   )-----------( 186      ( 2017 08:44 )             33.6     WBC Count: 4.9 K/uL ( @ 08:44)  WBC Count: 5.4 K/uL ( @ 08:19)  WBC Count: 6.7 K/uL ( @ 15:49)          144  |  111<H>  |  6<L>  ----------------------------<  127<H>  4.0   |  25  |  0.66    Ca    8.1<L>      2017 08:44  Phos  1.4       Mg     1.9         TPro  6.2  /  Alb  2.6<L>  /  TBili  0.8  /  DBili  x   /  AST  15  /  ALT  13  /  AlkPhos  46        Urinalysis Basic - ( 2017 01:47 )    Color: Yellow / Appearance: Clear / S.025 / pH: x  Gluc: x / Ketone: Moderate  / Bili: Negative / Urobili: Negative   Blood: x / Protein: 30 mg/dL / Nitrite: Positive   Leuk Esterase: Moderate / RBC: 10-25 /HPF / WBC 11-25 /HPF   Sq Epi: x / Non Sq Epi: Moderate /HPF / Bacteria: Many /HPF        LIVER FUNCTIONS - ( 2017 15:49 )  Alb: 2.6 g/dL / Pro: 6.2 g/dL / ALK PHOS: 46 U/L / ALT: 13 U/L DA / AST: 15 U/L / GGT: x             PT/INR - ( 2017 15:49 )   PT: 12.5 sec;   INR: 1.14 ratio         PTT - ( 2017 15:49 )  PTT:25.9 sec    Clostridium difficile Toxin by PCR (17 @ 09:35)    Clostridium difficile Toxin by PCR: The results of this test should be interpreted with consideration of all  clinical and laboratory findings. This test determines the presence of  the C. difficile tcdB gene at a given time and is not intended to  identify antibiotic associated disease or C. difficile infection without  clinical context. Successful treatment is based on the resolution of  clinical symptoms. This test should not be used as a "test of cure"  because C. difficile DNA will persist after successful treatment. Repeat  testing will not be permitted.    This test is performed on the BD MAX system using Real-Time PCR and  fluorogenic target-specific hybridization.    C Diff by PCR Result: Detected          CULTURES:   Culture - Stool (17 @ 09:48)    Specimen Source: .Stool Feces    Culture Results:   No enteric pathogens to date: Final culture pending        RADIOLOGY    CXR:  None yet.

## 2017-11-20 LAB
ANION GAP SERPL CALC-SCNC: 7 MMOL/L — SIGNIFICANT CHANGE UP (ref 5–17)
BUN SERPL-MCNC: 4 MG/DL — LOW (ref 7–18)
CALCIUM SERPL-MCNC: 8.9 MG/DL — SIGNIFICANT CHANGE UP (ref 8.4–10.5)
CHLORIDE SERPL-SCNC: 108 MMOL/L — SIGNIFICANT CHANGE UP (ref 96–108)
CO2 SERPL-SCNC: 28 MMOL/L — SIGNIFICANT CHANGE UP (ref 22–31)
CREAT SERPL-MCNC: 0.69 MG/DL — SIGNIFICANT CHANGE UP (ref 0.5–1.3)
CULTURE RESULTS: SIGNIFICANT CHANGE UP
GLUCOSE SERPL-MCNC: 124 MG/DL — HIGH (ref 70–99)
HCT VFR BLD CALC: 32.7 % — LOW (ref 34.5–45)
HGB BLD-MCNC: 10.8 G/DL — LOW (ref 11.5–15.5)
MCHC RBC-ENTMCNC: 31.4 PG — SIGNIFICANT CHANGE UP (ref 27–34)
MCHC RBC-ENTMCNC: 33.1 GM/DL — SIGNIFICANT CHANGE UP (ref 32–36)
MCV RBC AUTO: 95.1 FL — SIGNIFICANT CHANGE UP (ref 80–100)
PLATELET # BLD AUTO: 221 K/UL — SIGNIFICANT CHANGE UP (ref 150–400)
POTASSIUM SERPL-MCNC: 4.2 MMOL/L — SIGNIFICANT CHANGE UP (ref 3.5–5.3)
POTASSIUM SERPL-SCNC: 4.2 MMOL/L — SIGNIFICANT CHANGE UP (ref 3.5–5.3)
RBC # BLD: 3.44 M/UL — LOW (ref 3.8–5.2)
RBC # FLD: 12.5 % — SIGNIFICANT CHANGE UP (ref 10.3–14.5)
SODIUM SERPL-SCNC: 143 MMOL/L — SIGNIFICANT CHANGE UP (ref 135–145)
SPECIMEN SOURCE: SIGNIFICANT CHANGE UP
WBC # BLD: 5.2 K/UL — SIGNIFICANT CHANGE UP (ref 3.8–10.5)
WBC # FLD AUTO: 5.2 K/UL — SIGNIFICANT CHANGE UP (ref 3.8–10.5)

## 2017-11-20 RX ADMIN — HEPARIN SODIUM 5000 UNIT(S): 5000 INJECTION INTRAVENOUS; SUBCUTANEOUS at 17:48

## 2017-11-20 RX ADMIN — Medication 250 MILLIGRAM(S): at 17:48

## 2017-11-20 RX ADMIN — Medication 1 TABLET(S): at 12:23

## 2017-11-20 RX ADMIN — Medication 250 MILLIGRAM(S): at 01:35

## 2017-11-20 RX ADMIN — Medication 1 TABLET(S): at 08:47

## 2017-11-20 RX ADMIN — Medication 250 MILLIGRAM(S): at 12:10

## 2017-11-20 RX ADMIN — Medication 1 TABLET(S): at 17:48

## 2017-11-20 RX ADMIN — Medication 100 MILLIGRAM(S): at 14:45

## 2017-11-20 RX ADMIN — HEPARIN SODIUM 5000 UNIT(S): 5000 INJECTION INTRAVENOUS; SUBCUTANEOUS at 06:33

## 2017-11-20 RX ADMIN — Medication 250 MILLIGRAM(S): at 06:33

## 2017-11-20 RX ADMIN — Medication 100 MILLIGRAM(S): at 22:00

## 2017-11-20 RX ADMIN — PANTOPRAZOLE SODIUM 40 MILLIGRAM(S): 20 TABLET, DELAYED RELEASE ORAL at 06:33

## 2017-11-20 RX ADMIN — Medication 100 MILLIGRAM(S): at 06:33

## 2017-11-21 LAB
-  AMIKACIN: SIGNIFICANT CHANGE UP
-  AMPICILLIN/SULBACTAM: SIGNIFICANT CHANGE UP
-  AMPICILLIN: SIGNIFICANT CHANGE UP
-  AZTREONAM: SIGNIFICANT CHANGE UP
-  CEFAZOLIN: SIGNIFICANT CHANGE UP
-  CEFEPIME: SIGNIFICANT CHANGE UP
-  CEFOXITIN: SIGNIFICANT CHANGE UP
-  CEFTAZIDIME: SIGNIFICANT CHANGE UP
-  CEFTRIAXONE: SIGNIFICANT CHANGE UP
-  CIPROFLOXACIN: SIGNIFICANT CHANGE UP
-  ERTAPENEM: SIGNIFICANT CHANGE UP
-  GENTAMICIN: SIGNIFICANT CHANGE UP
-  IMIPENEM: SIGNIFICANT CHANGE UP
-  LEVOFLOXACIN: SIGNIFICANT CHANGE UP
-  MEROPENEM: SIGNIFICANT CHANGE UP
-  NITROFURANTOIN: SIGNIFICANT CHANGE UP
-  PIPERACILLIN/TAZOBACTAM: SIGNIFICANT CHANGE UP
-  TOBRAMYCIN: SIGNIFICANT CHANGE UP
-  TRIMETHOPRIM/SULFAMETHOXAZOLE: SIGNIFICANT CHANGE UP
ANION GAP SERPL CALC-SCNC: 8 MMOL/L — SIGNIFICANT CHANGE UP (ref 5–17)
BUN SERPL-MCNC: 4 MG/DL — LOW (ref 7–18)
CALCIUM SERPL-MCNC: 8.9 MG/DL — SIGNIFICANT CHANGE UP (ref 8.4–10.5)
CHLORIDE SERPL-SCNC: 105 MMOL/L — SIGNIFICANT CHANGE UP (ref 96–108)
CO2 SERPL-SCNC: 29 MMOL/L — SIGNIFICANT CHANGE UP (ref 22–31)
CREAT SERPL-MCNC: 0.69 MG/DL — SIGNIFICANT CHANGE UP (ref 0.5–1.3)
CULTURE RESULTS: SIGNIFICANT CHANGE UP
CULTURE RESULTS: SIGNIFICANT CHANGE UP
GLUCOSE SERPL-MCNC: 123 MG/DL — HIGH (ref 70–99)
HCT VFR BLD CALC: 33.5 % — LOW (ref 34.5–45)
HGB BLD-MCNC: 11.7 G/DL — SIGNIFICANT CHANGE UP (ref 11.5–15.5)
MCHC RBC-ENTMCNC: 33.2 PG — SIGNIFICANT CHANGE UP (ref 27–34)
MCHC RBC-ENTMCNC: 35 GM/DL — SIGNIFICANT CHANGE UP (ref 32–36)
MCV RBC AUTO: 94.9 FL — SIGNIFICANT CHANGE UP (ref 80–100)
METHOD TYPE: SIGNIFICANT CHANGE UP
ORGANISM # SPEC MICROSCOPIC CNT: SIGNIFICANT CHANGE UP
ORGANISM # SPEC MICROSCOPIC CNT: SIGNIFICANT CHANGE UP
PLATELET # BLD AUTO: 225 K/UL — SIGNIFICANT CHANGE UP (ref 150–400)
POTASSIUM SERPL-MCNC: 3.8 MMOL/L — SIGNIFICANT CHANGE UP (ref 3.5–5.3)
POTASSIUM SERPL-SCNC: 3.8 MMOL/L — SIGNIFICANT CHANGE UP (ref 3.5–5.3)
RBC # BLD: 3.52 M/UL — LOW (ref 3.8–5.2)
RBC # FLD: 12.8 % — SIGNIFICANT CHANGE UP (ref 10.3–14.5)
SODIUM SERPL-SCNC: 142 MMOL/L — SIGNIFICANT CHANGE UP (ref 135–145)
SPECIMEN SOURCE: SIGNIFICANT CHANGE UP
SPECIMEN SOURCE: SIGNIFICANT CHANGE UP
WBC # BLD: 6.2 K/UL — SIGNIFICANT CHANGE UP (ref 3.8–10.5)
WBC # FLD AUTO: 6.2 K/UL — SIGNIFICANT CHANGE UP (ref 3.8–10.5)

## 2017-11-21 RX ADMIN — Medication 100 MILLIGRAM(S): at 22:21

## 2017-11-21 RX ADMIN — Medication 100 MILLIGRAM(S): at 05:02

## 2017-11-21 RX ADMIN — HEPARIN SODIUM 5000 UNIT(S): 5000 INJECTION INTRAVENOUS; SUBCUTANEOUS at 05:02

## 2017-11-21 RX ADMIN — Medication 250 MILLIGRAM(S): at 11:57

## 2017-11-21 RX ADMIN — Medication 1 TABLET(S): at 19:10

## 2017-11-21 RX ADMIN — Medication 250 MILLIGRAM(S): at 19:11

## 2017-11-21 RX ADMIN — HEPARIN SODIUM 5000 UNIT(S): 5000 INJECTION INTRAVENOUS; SUBCUTANEOUS at 19:11

## 2017-11-21 RX ADMIN — Medication 100 MILLIGRAM(S): at 13:45

## 2017-11-21 RX ADMIN — Medication 1 TABLET(S): at 11:56

## 2017-11-21 RX ADMIN — Medication 1 TABLET(S): at 08:03

## 2017-11-21 RX ADMIN — Medication 250 MILLIGRAM(S): at 00:06

## 2017-11-21 RX ADMIN — PANTOPRAZOLE SODIUM 40 MILLIGRAM(S): 20 TABLET, DELAYED RELEASE ORAL at 07:01

## 2017-11-21 RX ADMIN — Medication 250 MILLIGRAM(S): at 05:02

## 2017-11-22 DIAGNOSIS — N39.0 URINARY TRACT INFECTION, SITE NOT SPECIFIED: ICD-10-CM

## 2017-11-22 LAB
ANION GAP SERPL CALC-SCNC: 9 MMOL/L — SIGNIFICANT CHANGE UP (ref 5–17)
BUN SERPL-MCNC: 5 MG/DL — LOW (ref 7–18)
CALCIUM SERPL-MCNC: 8.9 MG/DL — SIGNIFICANT CHANGE UP (ref 8.4–10.5)
CHLORIDE SERPL-SCNC: 105 MMOL/L — SIGNIFICANT CHANGE UP (ref 96–108)
CO2 SERPL-SCNC: 28 MMOL/L — SIGNIFICANT CHANGE UP (ref 22–31)
CREAT SERPL-MCNC: 0.65 MG/DL — SIGNIFICANT CHANGE UP (ref 0.5–1.3)
GLUCOSE SERPL-MCNC: 123 MG/DL — HIGH (ref 70–99)
POTASSIUM SERPL-MCNC: 3.7 MMOL/L — SIGNIFICANT CHANGE UP (ref 3.5–5.3)
POTASSIUM SERPL-SCNC: 3.7 MMOL/L — SIGNIFICANT CHANGE UP (ref 3.5–5.3)
SODIUM SERPL-SCNC: 142 MMOL/L — SIGNIFICANT CHANGE UP (ref 135–145)

## 2017-11-22 RX ORDER — METRONIDAZOLE 500 MG
500 TABLET ORAL EVERY 8 HOURS
Qty: 0 | Refills: 0 | Status: DISCONTINUED | OUTPATIENT
Start: 2017-11-22 | End: 2017-11-26

## 2017-11-22 RX ORDER — CEFUROXIME AXETIL 250 MG
250 TABLET ORAL EVERY 12 HOURS
Qty: 0 | Refills: 0 | Status: DISCONTINUED | OUTPATIENT
Start: 2017-11-22 | End: 2017-11-24

## 2017-11-22 RX ORDER — POTASSIUM CHLORIDE 20 MEQ
40 PACKET (EA) ORAL ONCE
Qty: 0 | Refills: 0 | Status: COMPLETED | OUTPATIENT
Start: 2017-11-22 | End: 2017-11-22

## 2017-11-22 RX ADMIN — Medication 100 MILLIGRAM(S): at 05:59

## 2017-11-22 RX ADMIN — Medication 40 MILLIEQUIVALENT(S): at 14:04

## 2017-11-22 RX ADMIN — Medication 1 TABLET(S): at 08:45

## 2017-11-22 RX ADMIN — Medication 250 MILLIGRAM(S): at 00:00

## 2017-11-22 RX ADMIN — Medication 1 TABLET(S): at 12:45

## 2017-11-22 RX ADMIN — Medication 1 TABLET(S): at 17:32

## 2017-11-22 RX ADMIN — Medication 250 MILLIGRAM(S): at 12:46

## 2017-11-22 RX ADMIN — Medication 500 MILLIGRAM(S): at 14:04

## 2017-11-22 RX ADMIN — HEPARIN SODIUM 5000 UNIT(S): 5000 INJECTION INTRAVENOUS; SUBCUTANEOUS at 17:32

## 2017-11-22 RX ADMIN — HEPARIN SODIUM 5000 UNIT(S): 5000 INJECTION INTRAVENOUS; SUBCUTANEOUS at 05:59

## 2017-11-22 RX ADMIN — Medication 250 MILLIGRAM(S): at 23:09

## 2017-11-22 RX ADMIN — Medication 250 MILLIGRAM(S): at 05:59

## 2017-11-22 RX ADMIN — Medication 500 MILLIGRAM(S): at 23:08

## 2017-11-22 RX ADMIN — PANTOPRAZOLE SODIUM 40 MILLIGRAM(S): 20 TABLET, DELAYED RELEASE ORAL at 06:24

## 2017-11-22 RX ADMIN — Medication 250 MILLIGRAM(S): at 17:32

## 2017-11-22 NOTE — PROGRESS NOTE ADULT - PROBLEM SELECTOR PLAN 2
blood pressure monitoring  low salt diet

## 2017-11-22 NOTE — PHYSICAL THERAPY INITIAL EVALUATION ADULT - ADDITIONAL COMMENTS
Pt. states loves to do her hobbies and is very active. She hopes to get back to it as soon as she gets well.

## 2017-11-22 NOTE — PROGRESS NOTE ADULT - PROBLEM SELECTOR PLAN 4
Ucx + E.coli- resistant to amp/fluoroquinolones  d/w ID dr. Ferrell, will advise today on abx. Ucx + E.coli- resistant to amp/fluoroquinolones  d/w ID dr. Ferrell, start Ceftin 250mg po bid

## 2017-11-22 NOTE — PROGRESS NOTE ADULT - PROBLEM SELECTOR PLAN 1
feeling better, tolerating diet  vanco po and flagyl continued  Dr Smalls following, appreciated  c/w isolation contact precautions feeling better, tolerating diet  vanco po and flagyl continued  Dr Smalls following, discussed, once patient ready for dc, will change abx to PO for total of 2 weeks.  c/w isolation contact precautions

## 2017-11-23 LAB
ANION GAP SERPL CALC-SCNC: 6 MMOL/L — SIGNIFICANT CHANGE UP (ref 5–17)
BUN SERPL-MCNC: 9 MG/DL — SIGNIFICANT CHANGE UP (ref 7–18)
CALCIUM SERPL-MCNC: 8.9 MG/DL — SIGNIFICANT CHANGE UP (ref 8.4–10.5)
CHLORIDE SERPL-SCNC: 105 MMOL/L — SIGNIFICANT CHANGE UP (ref 96–108)
CO2 SERPL-SCNC: 30 MMOL/L — SIGNIFICANT CHANGE UP (ref 22–31)
CREAT SERPL-MCNC: 0.76 MG/DL — SIGNIFICANT CHANGE UP (ref 0.5–1.3)
GLUCOSE SERPL-MCNC: 133 MG/DL — HIGH (ref 70–99)
POTASSIUM SERPL-MCNC: 4.6 MMOL/L — SIGNIFICANT CHANGE UP (ref 3.5–5.3)
POTASSIUM SERPL-SCNC: 4.6 MMOL/L — SIGNIFICANT CHANGE UP (ref 3.5–5.3)
SODIUM SERPL-SCNC: 141 MMOL/L — SIGNIFICANT CHANGE UP (ref 135–145)

## 2017-11-23 RX ADMIN — Medication 500 MILLIGRAM(S): at 13:13

## 2017-11-23 RX ADMIN — Medication 250 MILLIGRAM(S): at 11:31

## 2017-11-23 RX ADMIN — Medication 250 MILLIGRAM(S): at 17:18

## 2017-11-23 RX ADMIN — Medication 250 MILLIGRAM(S): at 23:24

## 2017-11-23 RX ADMIN — HEPARIN SODIUM 5000 UNIT(S): 5000 INJECTION INTRAVENOUS; SUBCUTANEOUS at 17:18

## 2017-11-23 RX ADMIN — Medication 500 MILLIGRAM(S): at 05:15

## 2017-11-23 RX ADMIN — Medication 1 TABLET(S): at 17:18

## 2017-11-23 RX ADMIN — HEPARIN SODIUM 5000 UNIT(S): 5000 INJECTION INTRAVENOUS; SUBCUTANEOUS at 05:16

## 2017-11-23 RX ADMIN — Medication 1 TABLET(S): at 08:01

## 2017-11-23 RX ADMIN — Medication 250 MILLIGRAM(S): at 05:16

## 2017-11-23 RX ADMIN — Medication 250 MILLIGRAM(S): at 05:15

## 2017-11-23 RX ADMIN — Medication 1 TABLET(S): at 11:31

## 2017-11-23 RX ADMIN — Medication 250 MILLIGRAM(S): at 17:54

## 2017-11-23 RX ADMIN — Medication 500 MILLIGRAM(S): at 21:41

## 2017-11-24 ENCOUNTER — TRANSCRIPTION ENCOUNTER (OUTPATIENT)
Age: 82
End: 2017-11-24

## 2017-11-24 LAB
ANION GAP SERPL CALC-SCNC: 7 MMOL/L — SIGNIFICANT CHANGE UP (ref 5–17)
BUN SERPL-MCNC: 11 MG/DL — SIGNIFICANT CHANGE UP (ref 7–18)
CALCIUM SERPL-MCNC: 9.2 MG/DL — SIGNIFICANT CHANGE UP (ref 8.4–10.5)
CHLORIDE SERPL-SCNC: 103 MMOL/L — SIGNIFICANT CHANGE UP (ref 96–108)
CO2 SERPL-SCNC: 31 MMOL/L — SIGNIFICANT CHANGE UP (ref 22–31)
CREAT SERPL-MCNC: 0.77 MG/DL — SIGNIFICANT CHANGE UP (ref 0.5–1.3)
GLUCOSE SERPL-MCNC: 112 MG/DL — HIGH (ref 70–99)
POTASSIUM SERPL-MCNC: 4.5 MMOL/L — SIGNIFICANT CHANGE UP (ref 3.5–5.3)
POTASSIUM SERPL-SCNC: 4.5 MMOL/L — SIGNIFICANT CHANGE UP (ref 3.5–5.3)
SODIUM SERPL-SCNC: 141 MMOL/L — SIGNIFICANT CHANGE UP (ref 135–145)

## 2017-11-24 RX ORDER — LACTOBACILLUS ACIDOPHILUS 100MM CELL
1 CAPSULE ORAL
Qty: 24 | Refills: 0 | OUTPATIENT
Start: 2017-11-24 | End: 2017-12-02

## 2017-11-24 RX ORDER — CEFUROXIME AXETIL 250 MG
500 TABLET ORAL EVERY 12 HOURS
Qty: 0 | Refills: 0 | Status: DISCONTINUED | OUTPATIENT
Start: 2017-11-24 | End: 2017-11-26

## 2017-11-24 RX ORDER — METRONIDAZOLE 500 MG
1 TABLET ORAL
Qty: 24 | Refills: 0 | OUTPATIENT
Start: 2017-11-24 | End: 2017-12-02

## 2017-11-24 RX ORDER — VANCOMYCIN HCL 1 G
250 VIAL (EA) INTRAVENOUS
Qty: 8000 | Refills: 0 | OUTPATIENT
Start: 2017-11-24 | End: 2017-12-02

## 2017-11-24 RX ORDER — CEFUROXIME AXETIL 250 MG
1 TABLET ORAL
Qty: 10 | Refills: 0 | OUTPATIENT
Start: 2017-11-24 | End: 2017-11-29

## 2017-11-24 RX ADMIN — Medication 500 MILLIGRAM(S): at 17:51

## 2017-11-24 RX ADMIN — Medication 1 TABLET(S): at 11:30

## 2017-11-24 RX ADMIN — Medication 250 MILLIGRAM(S): at 17:47

## 2017-11-24 RX ADMIN — Medication 1 TABLET(S): at 17:51

## 2017-11-24 RX ADMIN — Medication 500 MILLIGRAM(S): at 22:04

## 2017-11-24 RX ADMIN — Medication 250 MILLIGRAM(S): at 05:50

## 2017-11-24 RX ADMIN — Medication 500 MILLIGRAM(S): at 05:50

## 2017-11-24 RX ADMIN — Medication 250 MILLIGRAM(S): at 05:51

## 2017-11-24 RX ADMIN — Medication 250 MILLIGRAM(S): at 11:31

## 2017-11-24 RX ADMIN — Medication 500 MILLIGRAM(S): at 13:31

## 2017-11-24 RX ADMIN — Medication 1 TABLET(S): at 08:26

## 2017-11-24 NOTE — DISCHARGE NOTE ADULT - CARE PROVIDER_API CALL
Janneth Carroll), Cardiology; Internal Medicine  [address] [place] [id number]  Phone: [phone]  Fax: [phone]

## 2017-11-24 NOTE — DISCHARGE NOTE ADULT - PLAN OF CARE
prevent recurrence Continue antibiotics as prescribed. Follow up with PMD in 1 week. continue antibiotics as prescribed.

## 2017-11-24 NOTE — DISCHARGE NOTE ADULT - CARE PLAN
Principal Discharge DX:	Colitis  Goal:	prevent recurrence  Instructions for follow-up, activity and diet:	Continue antibiotics as prescribed. Follow up with PMD in 1 week.  Secondary Diagnosis:	UTI (urinary tract infection) due to Enterococcus  Instructions for follow-up, activity and diet:	continue antibiotics as prescribed.

## 2017-11-24 NOTE — DISCHARGE NOTE ADULT - MEDICATION SUMMARY - MEDICATIONS TO TAKE
I will START or STAY ON the medications listed below when I get home from the hospital:    metroNIDAZOLE 500 mg oral tablet  -- 1 tab(s) by mouth every 8 hours  -- Indication: For Cdiff colitis    cefuroxime 500 mg oral tablet  -- 1 tab(s) by mouth every 12 hours  -- Indication: For UTI (urinary tract infection) due to Enterococcus    vancomycin  -- 250 milligram(s) by mouth every 6 hours   -- Indication: For Cdiff colitis    lactobacillus acidophilus oral capsule  -- 1 tab(s) by mouth 3 times a day   -- Indication: For Cdiff colitis

## 2017-11-24 NOTE — DISCHARGE NOTE ADULT - PATIENT PORTAL LINK FT
“You can access the [facility] Patient Portal, offered by [facility], by registering with the following website: [email or web address]”

## 2017-11-24 NOTE — DISCHARGE NOTE ADULT - HOSPITAL COURSE
84 Hong Konger F from home, ambulates independently, w/ no PMH HTN, HLD, Chronic LBBB p/w watery diarrhea x 5 weeks. Prior to onset patient had UTI treated w/ Ceftin. Patient has been seen at ED of [facility] twice; 10/27 found to have colitis 2/2 CT findings and prescribed Ciprofloxacin/Flagyl, completed the ABx; 11/13 prescribed Imodium. The diarrhea is nonpainful, frequent > 5 times, sometimes contains blood but ultimately water, foul smelling yesterday but appropriate odor and color today. Patient reports 9 lbs weight loss x 5 weeks w/ loss of appetite. Diarrhea unrelated to specific food intake. Patient recently prescribed PO Vancomycin 11/15 and was not able to tolerate it. Patient denies any recent sick contacts, travel, F/chills, CP, SOB, LE swelling, and weakness.  In the ED, vital signs WNLs, BMP remarkable for hypokalemia 2.9 likely 2/2 dehydration - treating w/ IV and PO replacement - admitting patient for w/u of watery diarrhea.  C-diff positive by PCR and CT scan showing colitis, started on iv flagyl/po vanco. Also found with E.coli UTI, symptomatic, started on ceftin. Pt followed by infectious disease. Her diarrhea resolved and she was able to tolerate diet. PT - no skilled needs.  She is medically stable for discharge with follow up with PMD.

## 2017-11-25 LAB
ANION GAP SERPL CALC-SCNC: 8 MMOL/L — SIGNIFICANT CHANGE UP (ref 5–17)
BUN SERPL-MCNC: 12 MG/DL — SIGNIFICANT CHANGE UP (ref 7–18)
CALCIUM SERPL-MCNC: 9.2 MG/DL — SIGNIFICANT CHANGE UP (ref 8.4–10.5)
CHLORIDE SERPL-SCNC: 103 MMOL/L — SIGNIFICANT CHANGE UP (ref 96–108)
CO2 SERPL-SCNC: 28 MMOL/L — SIGNIFICANT CHANGE UP (ref 22–31)
CREAT SERPL-MCNC: 0.68 MG/DL — SIGNIFICANT CHANGE UP (ref 0.5–1.3)
GLUCOSE SERPL-MCNC: 105 MG/DL — HIGH (ref 70–99)
POTASSIUM SERPL-MCNC: 4.4 MMOL/L — SIGNIFICANT CHANGE UP (ref 3.5–5.3)
POTASSIUM SERPL-SCNC: 4.4 MMOL/L — SIGNIFICANT CHANGE UP (ref 3.5–5.3)
SODIUM SERPL-SCNC: 139 MMOL/L — SIGNIFICANT CHANGE UP (ref 135–145)

## 2017-11-25 RX ADMIN — Medication 250 MILLIGRAM(S): at 05:21

## 2017-11-25 RX ADMIN — Medication 500 MILLIGRAM(S): at 14:59

## 2017-11-25 RX ADMIN — Medication 500 MILLIGRAM(S): at 05:21

## 2017-11-25 RX ADMIN — Medication 1 TABLET(S): at 11:52

## 2017-11-25 RX ADMIN — Medication 250 MILLIGRAM(S): at 00:26

## 2017-11-25 RX ADMIN — Medication 500 MILLIGRAM(S): at 18:04

## 2017-11-25 RX ADMIN — Medication 500 MILLIGRAM(S): at 21:53

## 2017-11-25 RX ADMIN — Medication 250 MILLIGRAM(S): at 18:04

## 2017-11-25 RX ADMIN — Medication 250 MILLIGRAM(S): at 12:44

## 2017-11-25 RX ADMIN — Medication 250 MILLIGRAM(S): at 23:26

## 2017-11-25 RX ADMIN — Medication 1 TABLET(S): at 18:04

## 2017-11-25 RX ADMIN — Medication 1 TABLET(S): at 08:36

## 2017-11-26 VITALS
SYSTOLIC BLOOD PRESSURE: 125 MMHG | TEMPERATURE: 98 F | RESPIRATION RATE: 16 BRPM | DIASTOLIC BLOOD PRESSURE: 65 MMHG | OXYGEN SATURATION: 96 % | HEART RATE: 102 BPM

## 2017-11-26 RX ADMIN — Medication 500 MILLIGRAM(S): at 06:10

## 2017-11-26 RX ADMIN — Medication 250 MILLIGRAM(S): at 12:09

## 2017-11-26 RX ADMIN — Medication 1 TABLET(S): at 07:41

## 2017-11-26 RX ADMIN — Medication 1 TABLET(S): at 12:09

## 2017-11-26 RX ADMIN — Medication 500 MILLIGRAM(S): at 13:12

## 2017-11-26 RX ADMIN — Medication 250 MILLIGRAM(S): at 06:10

## 2017-11-26 NOTE — PROGRESS NOTE ADULT - PROVIDER SPECIALTY LIST ADULT
Cardiology
Infectious Disease
Internal Medicine
Internal Medicine
Infectious Disease
Internal Medicine

## 2017-11-26 NOTE — PROGRESS NOTE ADULT - ASSESSMENT
85 y/o female with  PMH HTN, HLD, Chronic LBBB presented with  watery diarrhea x 5 weeks sp  UTI treated with Ceftin. Patient has been seen at ED of [facility] found to have colitis end of october. Pt with stool culture + c diff   Patient was admitted for colitis on Flagyl and PO vanco. Seen at bedside, sitting in chair. Verbalizes feeling better. Tolerating regular diet. No abd pain, no fever or leucocytosis. denies diarrhea
Pt with hx of LBBB,HTN,Lipid D/O,UTI with c.diff colitis and UTI.  1.ABX as per ID.  2.Cont current medication.  3.GI and DVT prophylaxis.
Pt with hx of LBBB,HTN,Lipid D/O,UTI with c.diff colitis and UTI.  1.ABX as per ID.  2.Cont current medication.  3.GI and DVT prophylaxis.  4.D/C home and f/u as outpatient 2 weeks.
Pt with hx of LBBB,HTN,Lipid D/O,UTI with c.diff colitis.  1.ABX as per ID.  2.Cont current medication.  3.GI and DVT prophylaxis.
Pt with hx of LBBB,HTN,Lipid D/O,UTI with c.diff colitis.  1.ABX as per ID.  2.Cont current medication.  3.GI and DVT prophylaxis.
Pt with hx of LBBB,HTN,Lipid D/O,UTI with c.diff colitis.  1.ID eval Dr. Smalls called.  2.ABX.  3.Cont current medication.  4.GI and DVT prophylaxis.
83 y/o female with  PMH HTN, HLD, Chronic LBBB presented with  watery diarrhea x 5 weeks sp  UTI treated with Ceftin. Patient has been seen at ED of [facility] found to have colitis end of october. Pt with stool culture + c diff   Patient was admitted for colitis on Flagyl and PO vanco. Seen at bedside, sitting in chair. Verbalizes feeling better. Tolerating regular diet. No abd pain, no fever or leucocytosis. Reports loose stool this am

## 2017-11-27 LAB — CALPROTECTIN STL-MCNT: 655 UG/G — HIGH (ref 0–120)

## 2017-11-28 NOTE — PROGRESS NOTE ADULT - SUBJECTIVE AND OBJECTIVE BOX
Meds:  metroNIDAZOLE  IVPB 500 milliGRAM(s) IV Intermittent every 8 hours  metroNIDAZOLE  IVPB      vancomycin    Solution 250 milliGRAM(s) Oral every 6 hours    Allergies:  Allergies    No Known Allergies    Intolerances        ROS  [  ] UNABLE TO ELICIT    General:  [  ] None  [  ] Fever  [  ] Chills  [ x ] Malaise    Skin:  [ x ] None [  ] Rash  [  ] Wound  [  ] Ulcer    HEENT:  [ x ] None  [  ] Sore Throat  [  ] Nasal congestion/ runny nose  [  ] Photophobia [  ] Neck pain      Chest:  [ x ] None   [  ] SOB  [  ] Cough  [  ] None    Cardiovascular:   [ x ] None  [  ] CP  [  ] Palpitation    Gastrointestinal:  [  ] None  [  ] Abd pain   [  ] Nausea    [  ] Vomiting   [ x ] Diarrhea	     Genitourinary:  [ x ] None [  ] Polyuria   [  ] Urgency  [  ] Frequency  [  ] Dysuria    [  ]  Hematuria       Musculoskeletal:  [ x ] None [  ] Back Pain	[  ] Body aches  [  ] Joint pain    Neurological: [  ] None [  ]Dizziness  [  ]Visual Disturbance  [  ]Headaches   [ x ]  generalized weakness          PHYSICAL EXAM:    Vital Signs Last 24 Hrs  T(C): 36.6 (20 Nov 2017 21:08), Max: 36.6 (20 Nov 2017 21:08)  T(F): 97.8 (20 Nov 2017 21:08), Max: 97.8 (20 Nov 2017 21:08)  HR: 76 (20 Nov 2017 21:08) (76 - 82)  BP: 142/75 (20 Nov 2017 14:51) (142/75 - 148/58)  BP(mean): --  RR: 16 (20 Nov 2017 21:08) (14 - 16)  SpO2: 97% (20 Nov 2017 21:08) (97% - 98%)    HEENT: [ x ] Wnl  [  ] Pharyngeal congestion    Neck:  [ x ] Supple  [  ]Lymphadenopathy  [  ] No JVD   [  ] JVD  [  ] Masses   [  ] WNL    CHEST/Respiratory:  [ x ]Clear to auscultation  [  ] Rales   [  ] Rhonchi   [  ] Wheezing     [  ] Chest Tenderness      Cardiovascular:  [ x ] Reg S1 S2   [  ] Irreg S1 S2   [ x ]No Murmur  [  ] +ve Murmurs  [  ]Systolic [  ]Diastolic      Abdomen:  [ x ] Soft  [  ] No tendrerness  [  ] Tenderness  [  ] Organomegaly  [  ] ABD Distention  [  ] Rigidity                       [ x ] No Regidity                       [ x ] No Rebound Tenderness  [  ] No Guarding Rigidity  [  ] Rebound Tenderness[  ] Guarding Rigidity                          [ x ]  +ve Bowel Sounds  [  ] Decreased Bowel Sounds    [  ] Absent Bowel Sounds                            Extremities: [ x ] No edema [  ] Edema  [  ] Clubbing   [  ] Cyanosis                         [ x ] No Tender Calf muscles  [  ] Tender Calf muscles                        [ x ] Palpable peripheral pulses    Neurological: [ x ] Awake  [ x ] Alert  [ x ] Oriented  x  3                           [  ] Confused  [  ] Drowzy  [  ] repond to painful stimuli  [  ] Unresponsive    Skin:  [  ] Intact [  ] Redness [  ] Thrombophlebitis  [  ] Rashes  [  ] Dry  [  ] Ulcers    Ortho:  [  ] Joint Swelling  [  ] Joint erythema [  ] Joint tenderness                [  ] Increased temp. to touch  [ x ] DJD [  ] WNL            LABS/DIAGNOSTIC TESTS                          10.8   5.2   )-----------( 221      ( 20 Nov 2017 06:53 )             32.7         11-20    143  |  108  |  4<L>  ----------------------------<  124<H>  4.2   |  28  |  0.69    Ca    8.9      20 Nov 2017 06:53              CULTURES:   Culture - Urine (11.19.17 @ 21:46)    Specimen Source: .Urine Clean Catch (Midstream)    Culture Results:   50,000 - 99,000 CFU/mL Escherichia coli    Ova and Parasites (11.19.17 @ 21:40)    Culture Results:   Testing in progress    Culture - Stool (11.18.17 @ 09:48)    Specimen Source: .Stool Feces    Culture Results:   No enteric pathogens isolated.  (Stool culture examined for Salmonella,  Shigella, Campylobacter, Aeromonas, Plesiomonas,  Vibrio, E.coli O157 and Yersinia)    C Diff by PCR Result: Detected    RADIOLOGY  CXR:  None yet.      Assessment and Recommendation:   84 [place] F from home, ambulates independently, w/ no PMH HTN, HLD, Chronic LBBB p/w watery diarrhea x 5 weeks. Prior to onset patient had UTI treated w/ Ceftin. Patient has been seen at ED of [facility] twice; 10/27 found to have colitis 2/2 CT findings and prescribed Ciprofloxacin/Flagyl, completed the ABx; 11/13 prescribed Imodium.  Patient was admitted for colitis and was started on IV Flagyl.  Vancomycin solution was added PO and patient tolerates it.  urine culture came +ve for E COLI pending sensitivity.    Problem/Recommendation - 1:  Problem: Colitis.   Recommendation:   1- IV hydration.  2- Continue IV Flagyl.  3- Continue PO vancomycin.  4- Follow urine culture to final sensitivity report.  5- CBC & BMP follow up.    Problem/Recommendation - 2:  ·  Problem: HTN (hypertension).    Recommendation:   1- Monitor Blood pressure closely.  2- Blood pressure control.  3- BP. meds as per cardiology and primary care team.     Problem/Recommendation - 3:  ·  Problem: HLD (hyperlipidemia).    Recommendation:   1- low fat low cholesterol diet.  2- Lipid profile.  3- Cholesterol medication as per primary care team.     Discussed with NP and with Patient.
CHIEF COMPLAINT: Patient is a 84y old  Female who presents with a chief complaint of Diarhrea X 5 weeks.Pt feeling better, tolerating po diet.    	  REVIEW OF SYSTEMS:  CONSTITUTIONAL: No fever, weight loss, or fatigue  EYES: No eye pain, visual disturbances, or discharge  ENT:  No difficulty hearing, tinnitus, vertigo; No sinus or throat pain  NECK: No pain or stiffness  RESPIRATORY: No cough, wheezing, chills or hemoptysis; No Shortness of Breath  CARDIOVASCULAR: No chest pain, palpitations, passing out, dizziness, or leg swelling  GASTROINTESTINAL: No abdominal or epigastric pain. No nausea, vomiting, or hematemesis; No diarrhea or constipation. No melena or hematochezia.  GENITOURINARY: No dysuria, frequency, hematuria, or incontinence  NEUROLOGICAL: No headaches, memory loss, loss of strength, numbness, or tremors  SKIN: No itching, burning, rashes, or lesions   LYMPH Nodes: No enlarged glands  ENDOCRINE: No heat or cold intolerance; No hair loss  MUSCULOSKELETAL: No joint pain or swelling; No muscle, back, or extremity pain  PSYCHIATRIC: No depression, anxiety, mood swings, or difficulty sleeping  HEME/LYMPH: No easy bruising, or bleeding gums  ALLERGY AND IMMUNOLOGIC: No hives or eczema	      PHYSICAL EXAM:  T(C): 36.9 (11-19-17 @ 06:11), Max: 36.9 (11-19-17 @ 06:11)  HR: 71 (11-19-17 @ 06:11) (71 - 80)  BP: 123/50 (11-19-17 @ 06:11) (115/48 - 123/50)  RR: 16 (11-19-17 @ 06:11) (16 - 18)  SpO2: 93% (11-19-17 @ 06:11) (93% - 96%)    Appearance: Normal	  HEENT:   Normal oral mucosa, PERRL, EOMI	  Lymphatic: No lymphadenopathy  Cardiovascular: Normal S1 S2, No JVD, No murmurs, No edema  Respiratory: Lungs clear to auscultation	  Psychiatry: A & O x 3, Mood & affect appropriate  Gastrointestinal:  Soft, Non-tender, + BS	  Skin: No rashes, No ecchymoses, No cyanosis	  Neurologic: Non-focal  Extremities: Normal range of motion, No clubbing, cyanosis or edema  Vascular: Peripheral pulses palpable 2+ bilaterally    MEDICATIONS  (STANDING):  dextrose 5% + sodium chloride 0.45% with potassium chloride 20 mEq/L 1000 milliLiter(s) (75 mL/Hr) IV Continuous <Continuous>  heparin  Injectable 5000 Unit(s) SubCutaneous every 12 hours  lactobacillus acidophilus 1 Tablet(s) Oral three times a day with meals  metroNIDAZOLE  IVPB 500 milliGRAM(s) IV Intermittent every 8 hours  metroNIDAZOLE  IVPB      pantoprazole    Tablet 40 milliGRAM(s) Oral before breakfast  vancomycin    Solution 250 milliGRAM(s) Oral every 6 hours      	  LABS:	 	                         10.9   4.9   )-----------( 186      ( 19 Nov 2017 08:44 )             33.6     11-19    144  |  111<H>  |  6<L>  ----------------------------<  127<H>  4.0   |  25  |  0.66    Ca    8.1<L>      19 Nov 2017 08:44  Phos  1.4     11-18  Mg     1.9     11-18    TPro  6.2  /  Alb  2.6<L>  /  TBili  0.8  /  DBili  x   /  AST  15  /  ALT  13  /  AlkPhos  46  11-17    Lipid Profile: Cholesterol 131  LDL 76  HDL 37  TG 91    HgA1c: Hemoglobin A1C, Whole Blood: 6.1 % (11-18 @ 09:35)    TSH: Thyroid Stimulating Hormone, Serum: 2.72 uU/mL (11-18 @ 08:19)        C Diff by PCR Result: Detected
CHIEF COMPLAINT: Patient is a 84y old  Female who presents with a chief complaint of Diarrhea X 5 weeks. Pt feeling better.    	  REVIEW OF SYSTEMS:  CONSTITUTIONAL: No fever, weight loss, or fatigue  EYES: No eye pain, visual disturbances, or discharge  ENT:  No difficulty hearing, tinnitus, vertigo; No sinus or throat pain  NECK: No pain or stiffness  RESPIRATORY: No cough, wheezing, chills or hemoptysis; No Shortness of Breath  CARDIOVASCULAR: No chest pain, palpitations, passing out, dizziness, or leg swelling  GASTROINTESTINAL: No abdominal or epigastric pain. No nausea, vomiting, or hematemesis; No diarrhea or constipation. No melena or hematochezia.  GENITOURINARY: No dysuria, frequency, hematuria, or incontinence  NEUROLOGICAL: No headaches, memory loss, loss of strength, numbness, or tremors  SKIN: No itching, burning, rashes, or lesions   LYMPH Nodes: No enlarged glands  ENDOCRINE: No heat or cold intolerance; No hair loss  MUSCULOSKELETAL: No joint pain or swelling; No muscle, back, or extremity pain  PSYCHIATRIC: No depression, anxiety, mood swings, or difficulty sleeping  HEME/LYMPH: No easy bruising, or bleeding gums  ALLERGY AND IMMUNOLOGIC: No hives or eczema	      PHYSICAL EXAM:  T(C): 36.2 (11-20-17 @ 05:49), Max: 36.9 (11-19-17 @ 21:38)  HR: 78 (11-20-17 @ 05:49) (71 - 79)  BP: 148/58 (11-20-17 @ 05:49) (114/54 - 150/55)  RR: 14 (11-20-17 @ 05:49) (14 - 18)  SpO2: 98% (11-20-17 @ 05:49) (97% - 98%)    I&O's Summary    19 Nov 2017 07:01  -  20 Nov 2017 07:00  --------------------------------------------------------  IN: 375 mL / OUT: 0 mL / NET: 375 mL        Appearance: Normal	  HEENT:   Normal oral mucosa, PERRL, EOMI	  Lymphatic: No lymphadenopathy  Cardiovascular: Normal S1 S2, No JVD, No murmurs, No edema  Respiratory: Lungs clear to auscultation	  Psychiatry: A & O x 3, Mood & affect appropriate  Gastrointestinal:  Soft, Non-tender, + BS	  Skin: No rashes, No ecchymoses, No cyanosis	  Neurologic: Non-focal  Extremities: Normal range of motion, No clubbing, cyanosis or edema  Vascular: Peripheral pulses palpable 2+ bilaterally    MEDICATIONS  (STANDING):  dextrose 5% + sodium chloride 0.45% with potassium chloride 20 mEq/L 1000 milliLiter(s) (75 mL/Hr) IV Continuous <Continuous>  heparin  Injectable 5000 Unit(s) SubCutaneous every 12 hours  lactobacillus acidophilus 1 Tablet(s) Oral three times a day with meals  metroNIDAZOLE  IVPB 500 milliGRAM(s) IV Intermittent every 8 hours  metroNIDAZOLE  IVPB      pantoprazole    Tablet 40 milliGRAM(s) Oral before breakfast  vancomycin    Solution 250 milliGRAM(s) Oral every 6 hours      	  LABS:	 	                          10.8   5.2   )-----------( 221      ( 20 Nov 2017 06:53 )             32.7     11-20    143  |  108  |  4<L>  ----------------------------<  124<H>  4.2   |  28  |  0.69    Ca    8.9      20 Nov 2017 06:53      Lipid Profile: Cholesterol 131  LDL 76  HDL 37  TG 91    HgA1c: Hemoglobin A1C, Whole Blood: 6.1 % (11-18 @ 09:35)    TSH: Thyroid Stimulating Hormone, Serum: 2.72 uU/mL (11-18 @ 08:19)
CHIEF COMPLAINT:Patient is a 84y old  Female who presents with a chief complaint of Diarhrea X 5 weeks . Pt appears comfortable,feeling better.    	  REVIEW OF SYSTEMS:  CONSTITUTIONAL: No fever, weight loss, or fatigue  EYES: No eye pain, visual disturbances, or discharge  ENT:  No difficulty hearing, tinnitus, vertigo; No sinus or throat pain  NECK: No pain or stiffness  RESPIRATORY: No cough, wheezing, chills or hemoptysis; No Shortness of Breath  CARDIOVASCULAR: No chest pain, palpitations, passing out, dizziness, or leg swelling  GASTROINTESTINAL: No abdominal or epigastric pain. No nausea, vomiting, or hematemesis; No diarrhea or constipation. No melena or hematochezia.  GENITOURINARY: No dysuria, frequency, hematuria, or incontinence  NEUROLOGICAL: No headaches, memory loss, loss of strength, numbness, or tremors  SKIN: No itching, burning, rashes, or lesions   LYMPH Nodes: No enlarged glands  ENDOCRINE: No heat or cold intolerance; No hair loss  MUSCULOSKELETAL: No joint pain or swelling; No muscle, back, or extremity pain  PSYCHIATRIC: No depression, anxiety, mood swings, or difficulty sleeping  HEME/LYMPH: No easy bruising, or bleeding gums  ALLERGY AND IMMUNOLOGIC: No hives or eczema	      PHYSICAL EXAM:  T(C): 36.8 (11-21-17 @ 06:08), Max: 36.8 (11-21-17 @ 06:08)  HR: 73 (11-21-17 @ 06:08) (73 - 82)  BP: 139/58 (11-21-17 @ 06:08) (139/58 - 142/75)  RR: 14 (11-21-17 @ 06:08) (14 - 16)  SpO2: 94% (11-21-17 @ 06:08) (94% - 98%)  Wt(kg): --  I&O's Summary    21 Nov 2017 07:01  -  21 Nov 2017 11:43  --------------------------------------------------------  IN: 0 mL / OUT: 1 mL / NET: -1 mL        Appearance: Normal	  HEENT:   Normal oral mucosa, PERRL, EOMI	  Lymphatic: No lymphadenopathy  Cardiovascular: Normal S1 S2, No JVD, No murmurs, No edema  Respiratory: Lungs clear to auscultation	  Psychiatry: A & O x 3, Mood & affect appropriate  Gastrointestinal:  Soft, Non-tender, + BS	  Skin: No rashes, No ecchymoses, No cyanosis	  Neurologic: Non-focal  Extremities: Normal range of motion, No clubbing, cyanosis or edema  Vascular: Peripheral pulses palpable 2+ bilaterally    MEDICATIONS  (STANDING):  dextrose 5% + sodium chloride 0.45% with potassium chloride 20 mEq/L 1000 milliLiter(s) (75 mL/Hr) IV Continuous <Continuous>  heparin  Injectable 5000 Unit(s) SubCutaneous every 12 hours  lactobacillus acidophilus 1 Tablet(s) Oral three times a day with meals  metroNIDAZOLE  IVPB 500 milliGRAM(s) IV Intermittent every 8 hours  metroNIDAZOLE  IVPB      pantoprazole    Tablet 40 milliGRAM(s) Oral before breakfast  vancomycin    Solution 250 milliGRAM(s) Oral every 6 hours      	  LABS:	 	                       11.7   6.2   )-----------( 225      ( 21 Nov 2017 06:41 )             33.5     11-21    142  |  105  |  4<L>  ----------------------------<  123<H>  3.8   |  29  |  0.69    Ca    8.9      21 Nov 2017 06:41        Lipid Profile: Cholesterol 131  LDL 76  HDL 37  TG 91    HgA1c: Hemoglobin A1C, Whole Blood: 6.1 % (11-18 @ 09:35)    TSH: Thyroid Stimulating Hormone, Serum: 2.72 uU/mL (11-18 @ 08:19)
CHIEF COMPLAINT:Patient is a 84y old  Female who presents with a chief complaint of Diarhrea X 5 weeks . Pt appears comfortable.    	  REVIEW OF SYSTEMS:  CONSTITUTIONAL: No fever, weight loss, or fatigue  EYES: No eye pain, visual disturbances, or discharge  ENT:  No difficulty hearing, tinnitus, vertigo; No sinus or throat pain  NECK: No pain or stiffness  RESPIRATORY: No cough, wheezing, chills or hemoptysis; No Shortness of Breath  CARDIOVASCULAR: No chest pain, palpitations, passing out, dizziness, or leg swelling  GASTROINTESTINAL: No abdominal or epigastric pain. No nausea, vomiting, or hematemesis; No diarrhea or constipation. No melena or hematochezia.  GENITOURINARY: No dysuria, frequency, hematuria, or incontinence  NEUROLOGICAL: No headaches, memory loss, loss of strength, numbness, or tremors  SKIN: No itching, burning, rashes, or lesions   LYMPH Nodes: No enlarged glands  ENDOCRINE: No heat or cold intolerance; No hair loss  MUSCULOSKELETAL: No joint pain or swelling; No muscle, back, or extremity pain  PSYCHIATRIC: No depression, anxiety, mood swings, or difficulty sleeping  HEME/LYMPH: No easy bruising, or bleeding gums  ALLERGY AND IMMUNOLOGIC: No hives or eczema	      PHYSICAL EXAM:  T(C): 36.9 (11-26-17 @ 06:12), Max: 36.9 (11-25-17 @ 21:39)  HR: 74 (11-26-17 @ 06:12) (74 - 88)  BP: 130/66 (11-26-17 @ 06:12) (112/50 - 130/66)  RR: 16 (11-26-17 @ 06:12) (14 - 16)  SpO2: 98% (11-26-17 @ 06:12) (97% - 99%)      Appearance: Normal	  HEENT:   Normal oral mucosa, PERRL, EOMI	  Lymphatic: No lymphadenopathy  Cardiovascular: Normal S1 S2, No JVD, No murmurs, No edema  Respiratory: Lungs clear to auscultation	  Psychiatry: A & O x 3, Mood & affect appropriate  Gastrointestinal:  Soft, Non-tender, + BS	  Skin: No rashes, No ecchymoses, No cyanosis	  Neurologic: Non-focal  Extremities: Normal range of motion, No clubbing, cyanosis or edema  Vascular: Peripheral pulses palpable 2+ bilaterally    MEDICATIONS  (STANDING):  cefuroxime   Tablet 500 milliGRAM(s) Oral every 12 hours  dextrose 5% + sodium chloride 0.45% with potassium chloride 20 mEq/L 1000 milliLiter(s) (75 mL/Hr) IV Continuous <Continuous>  heparin  Injectable 5000 Unit(s) SubCutaneous every 12 hours  lactobacillus acidophilus 1 Tablet(s) Oral three times a day with meals  metroNIDAZOLE    Tablet 500 milliGRAM(s) Oral every 8 hours  vancomycin    Solution 250 milliGRAM(s) Oral every 6 hours        	  LABS:	 	    11-25    139  |  103  |  12  ----------------------------<  105<H>  4.4   |  28  |  0.68    Ca    9.2      25 Nov 2017 08:20        Lipid Profile: Cholesterol 131  LDL 76  HDL 37  TG 91    HgA1c: Hemoglobin A1C, Whole Blood: 6.1 % (11-18 @ 09:35)    TSH: Thyroid Stimulating Hormone, Serum: 2.72 uU/mL (11-18 @ 08:19)
CHIEF COMPLAINT:Patient is a 84y old  Female who presents with a chief complaint of Diarhrea X 5 weeks .Pt appears comfortable.    	  REVIEW OF SYSTEMS:  CONSTITUTIONAL: No fever, weight loss, or fatigue  EYES: No eye pain, visual disturbances, or discharge  ENT:  No difficulty hearing, tinnitus, vertigo; No sinus or throat pain  NECK: No pain or stiffness  RESPIRATORY: No cough, wheezing, chills or hemoptysis; No Shortness of Breath  CARDIOVASCULAR: No chest pain, palpitations, passing out, dizziness, or leg swelling  GASTROINTESTINAL: No abdominal or epigastric pain. No nausea, vomiting, or hematemesis; No diarrhea or constipation. No melena or hematochezia.  GENITOURINARY: No dysuria, frequency, hematuria, or incontinence  NEUROLOGICAL: No headaches, memory loss, loss of strength, numbness, or tremors  SKIN: No itching, burning, rashes, or lesions   LYMPH Nodes: No enlarged glands  ENDOCRINE: No heat or cold intolerance; No hair loss  MUSCULOSKELETAL: No joint pain or swelling; No muscle, back, or extremity pain  PSYCHIATRIC: No depression, anxiety, mood swings, or difficulty sleeping  HEME/LYMPH: No easy bruising, or bleeding gums  ALLERGY AND IMMUNOLOGIC: No hives or eczema	      PHYSICAL EXAM:  T(C): 36.4 (11-22-17 @ 05:58), Max: 36.7 (11-21-17 @ 13:10)  HR: 81 (11-22-17 @ 09:33) (74 - 82)  BP: 122/71 (11-22-17 @ 09:33) (122/71 - 143/75)  RR: 14 (11-22-17 @ 05:58) (14 - 16)  SpO2: 99% (11-22-17 @ 09:33) (95% - 99%)    21 Nov 2017 07:01  -  22 Nov 2017 07:00  --------------------------------------------------------  IN: 0 mL / OUT: 1 mL / NET: -1 mL        Appearance: Normal	  HEENT:   Normal oral mucosa, PERRL, EOMI	  Lymphatic: No lymphadenopathy  Cardiovascular: Normal S1 S2, No JVD, No murmurs, No edema  Respiratory: Lungs clear to auscultation	  Psychiatry: A & O x 3, Mood & affect appropriate  Gastrointestinal:  Soft, Non-tender, + BS	  Skin: No rashes, No ecchymoses, No cyanosis	  Neurologic: Non-focal  Extremities: Normal range of motion, No clubbing, cyanosis or edema  Vascular: Peripheral pulses palpable 2+ bilaterally    MEDICATIONS  (STANDING):  dextrose 5% + sodium chloride 0.45% with potassium chloride 20 mEq/L 1000 milliLiter(s) (75 mL/Hr) IV Continuous <Continuous>  heparin  Injectable 5000 Unit(s) SubCutaneous every 12 hours  lactobacillus acidophilus 1 Tablet(s) Oral three times a day with meals  metroNIDAZOLE  IVPB 500 milliGRAM(s) IV Intermittent every 8 hours  metroNIDAZOLE  IVPB      potassium chloride    Tablet ER 40 milliEquivalent(s) Oral once  vancomycin    Solution 250 milliGRAM(s) Oral every 6 hours      LABS:	 	                        11.7   6.2   )-----------( 225      ( 21 Nov 2017 06:41 )             33.5     11-22    142  |  105  |  5<L>  ----------------------------<  123<H>  3.7   |  28  |  0.65    Ca    8.9      22 Nov 2017 08:16        Lipid Profile: Cholesterol 131  LDL 76  HDL 37  TG 91    HgA1c: Hemoglobin A1C, Whole Blood: 6.1 % (11-18 @ 09:35)    TSH: Thyroid Stimulating Hormone, Serum: 2.72 uU/mL (11-18 @ 08:19)    Culture - Urine (11.19.17 @ 21:46)    -  Amikacin: S <=8    -  Ampicillin: R >16    -  Ampicillin/Sulbactam: R >16/8    -  Aztreonam: S <=4    -  Cefazolin: S <=2    -  Cefepime: S <=2    -  Cefoxitin: S <=4    -  Ceftazidime: S <=1    -  Ceftriaxone: S <=1    -  Ciprofloxacin: R >2    -  Ertapenem: S <=0.5    -  Gentamicin: S <=1    -  Imipenem: S <=1    -  Levofloxacin: R >4    -  Meropenem: S <=1    -  Nitrofurantoin: S <=32    -  Piperacillin/Tazobactam: S <=8    -  Tobramycin: S <=2    -  Trimethoprim/Sulfamethoxazole: S <=0.5/9.5    Specimen Source: .Urine Clean Catch (Midstream)    Culture Results:   50,000 - 99,000 CFU/mL Escherichia coli    Organism Identification: Escherichia coli    Organism: Escherichia coli    Method Type: CJ
CHIEF COMPLAINT:Patient is a 84y old  Female who presents with a chief complaint of Diarhrea X 5 weeks .Pt appears comfortable.    	  REVIEW OF SYSTEMS:  CONSTITUTIONAL: No fever, weight loss, or fatigue  EYES: No eye pain, visual disturbances, or discharge  ENT:  No difficulty hearing, tinnitus, vertigo; No sinus or throat pain  NECK: No pain or stiffness  RESPIRATORY: No cough, wheezing, chills or hemoptysis; No Shortness of Breath  CARDIOVASCULAR: No chest pain, palpitations, passing out, dizziness, or leg swelling  GASTROINTESTINAL: No abdominal or epigastric pain. No nausea, vomiting, or hematemesis; No diarrhea or constipation. No melena or hematochezia.  GENITOURINARY: No dysuria, frequency, hematuria, or incontinence  NEUROLOGICAL: No headaches, memory loss, loss of strength, numbness, or tremors  SKIN: No itching, burning, rashes, or lesions   LYMPH Nodes: No enlarged glands  ENDOCRINE: No heat or cold intolerance; No hair loss  MUSCULOSKELETAL: No joint pain or swelling; No muscle, back, or extremity pain  PSYCHIATRIC: No depression, anxiety, mood swings, or difficulty sleeping  HEME/LYMPH: No easy bruising, or bleeding gums  ALLERGY AND IMMUNOLOGIC: No hives or eczema	      PHYSICAL EXAM:  T(C): 36.9 (11-25-17 @ 06:04), Max: 36.9 (11-25-17 @ 06:04)  HR: 87 (11-25-17 @ 06:04) (77 - 90)  BP: 115/56 (11-25-17 @ 06:04) (115/56 - 141/773)  RR: 14 (11-25-17 @ 06:04) (14 - 17)  SpO2: 97% (11-25-17 @ 06:04) (97% - 98%)      Appearance: Normal	  HEENT:   Normal oral mucosa, PERRL, EOMI	  Lymphatic: No lymphadenopathy  Cardiovascular: Normal S1 S2, No JVD, No murmurs, No edema  Respiratory: Lungs clear to auscultation	  Psychiatry: A & O x 3, Mood & affect appropriate  Gastrointestinal:  Soft, Non-tender, + BS	  Skin: No rashes, No ecchymoses, No cyanosis	  Neurologic: Non-focal  Extremities: Normal range of motion, No clubbing, cyanosis or edema  Vascular: Peripheral pulses palpable 2+ bilaterally    MEDICATIONS  (STANDING):  cefuroxime   Tablet 500 milliGRAM(s) Oral every 12 hours  dextrose 5% + sodium chloride 0.45% with potassium chloride 20 mEq/L 1000 milliLiter(s) (75 mL/Hr) IV Continuous <Continuous>  heparin  Injectable 5000 Unit(s) SubCutaneous every 12 hours  lactobacillus acidophilus 1 Tablet(s) Oral three times a day with meals  metroNIDAZOLE    Tablet 500 milliGRAM(s) Oral every 8 hours  vancomycin    Solution 250 milliGRAM(s) Oral every 6 hours      	  LABS:	 	    11-25    139  |  103  |  12  ----------------------------<  105<H>  4.4   |  28  |  0.68    Ca    9.2      25 Nov 2017 08:20        Lipid Profile: Cholesterol 131  LDL 76  HDL 37  TG 91    HgA1c: Hemoglobin A1C, Whole Blood: 6.1 % (11-18 @ 09:35)    TSH: Thyroid Stimulating Hormone, Serum: 2.72 uU/mL (11-18 @ 08:19)
CHIEF COMPLAINT:Patient is a 84y old  Female who presents with a chief complaint of Diarhrea X 5 weeks .Pt had 2 BM today.    	  REVIEW OF SYSTEMS:  CONSTITUTIONAL: No fever, weight loss, or fatigue  EYES: No eye pain, visual disturbances, or discharge  ENT:  No difficulty hearing, tinnitus, vertigo; No sinus or throat pain  NECK: No pain or stiffness  RESPIRATORY: No cough, wheezing, chills or hemoptysis; No Shortness of Breath  CARDIOVASCULAR: No chest pain, palpitations, passing out, dizziness, or leg swelling  GASTROINTESTINAL: No abdominal or epigastric pain. No nausea, vomiting, or hematemesis; No diarrhea or constipation. No melena or hematochezia.  GENITOURINARY: No dysuria, frequency, hematuria, or incontinence  NEUROLOGICAL: No headaches, memory loss, loss of strength, numbness, or tremors  SKIN: No itching, burning, rashes, or lesions   LYMPH Nodes: No enlarged glands  ENDOCRINE: No heat or cold intolerance; No hair loss  MUSCULOSKELETAL: No joint pain or swelling; No muscle, back, or extremity pain  PSYCHIATRIC: No depression, anxiety, mood swings, or difficulty sleeping  HEME/LYMPH: No easy bruising, or bleeding gums  ALLERGY AND IMMUNOLOGIC: No hives or eczema	      PHYSICAL EXAM:  T(C): 36.3 (11-23-17 @ 05:47), Max: 36.8 (11-22-17 @ 20:20)  HR: 76 (11-23-17 @ 05:47) (74 - 82)  BP: 162/85 (11-23-17 @ 05:47) (125/75 - 162/85)  RR: 18 (11-23-17 @ 05:47) (15 - 18)  SpO2: 95% (11-23-17 @ 05:47) (95% - 95%)    Appearance: Normal	  HEENT:   Normal oral mucosa, PERRL, EOMI	  Lymphatic: No lymphadenopathy  Cardiovascular: Normal S1 S2, No JVD, No murmurs, No edema  Respiratory: Lungs clear to auscultation	  Psychiatry: A & O x 3, Mood & affect appropriate  Gastrointestinal:  Soft, Non-tender, + BS	  Skin: No rashes, No ecchymoses, No cyanosis	  Neurologic: Non-focal  Extremities: Normal range of motion, No clubbing, cyanosis or edema  Vascular: Peripheral pulses palpable 2+ bilaterally    MEDICATIONS  (STANDING):  cefuroxime   Tablet 250 milliGRAM(s) Oral every 12 hours  dextrose 5% + sodium chloride 0.45% with potassium chloride 20 mEq/L 1000 milliLiter(s) (75 mL/Hr) IV Continuous <Continuous>  heparin  Injectable 5000 Unit(s) SubCutaneous every 12 hours  lactobacillus acidophilus 1 Tablet(s) Oral three times a day with meals  metroNIDAZOLE    Tablet 500 milliGRAM(s) Oral every 8 hours  vancomycin    Solution 250 milliGRAM(s) Oral every 6 hours      	  LABS:	 	      11-23    141  |  105  |  9   ----------------------------<  133<H>  4.6   |  30  |  0.76    Ca    8.9      23 Nov 2017 05:45    Lipid Profile: Cholesterol 131  LDL 76  HDL 37  TG 91    HgA1c: Hemoglobin A1C, Whole Blood: 6.1 % (11-18 @ 09:35)    TSH: Thyroid Stimulating Hormone, Serum: 2.72 uU/mL (11-18 @ 08:19)
CHIEF COMPLAINT:Patient is a 84y old  Female who presents with a chief complaint of Diarhrea X 5 weeks .Pt reports 3  BM yesterday.    	  REVIEW OF SYSTEMS:  CONSTITUTIONAL: No fever, weight loss, or fatigue  EYES: No eye pain, visual disturbances, or discharge  ENT:  No difficulty hearing, tinnitus, vertigo; No sinus or throat pain  NECK: No pain or stiffness  RESPIRATORY: No cough, wheezing, chills or hemoptysis; No Shortness of Breath  CARDIOVASCULAR: No chest pain, palpitations, passing out, dizziness, or leg swelling  GASTROINTESTINAL: No abdominal or epigastric pain. No nausea, vomiting, or hematemesis; No diarrhea or constipation. No melena or hematochezia.  GENITOURINARY: No dysuria, frequency, hematuria, or incontinence  NEUROLOGICAL: No headaches, memory loss, loss of strength, numbness, or tremors  SKIN: No itching, burning, rashes, or lesions   LYMPH Nodes: No enlarged glands  ENDOCRINE: No heat or cold intolerance; No hair loss  MUSCULOSKELETAL: No joint pain or swelling; No muscle, back, or extremity pain  PSYCHIATRIC: No depression, anxiety, mood swings, or difficulty sleeping  HEME/LYMPH: No easy bruising, or bleeding gums  ALLERGY AND IMMUNOLOGIC: No hives or eczema	      PHYSICAL EXAM:  T(C): 36.4 (11-24-17 @ 05:58), Max: 36.9 (11-23-17 @ 12:05)  HR: 77 (11-24-17 @ 05:58) (73 - 79)  BP: 128/62 (11-24-17 @ 05:58) (128/62 - 146/54)  RR: 16 (11-24-17 @ 05:58) (16 - 18)  SpO2: 96% (11-24-17 @ 05:58) (95% - 96%)      Appearance: Normal	  HEENT:   Normal oral mucosa, PERRL, EOMI	  Lymphatic: No lymphadenopathy  Cardiovascular: Normal S1 S2, No JVD, No murmurs, No edema  Respiratory: Lungs clear to auscultation	  Psychiatry: A & O x 3, Mood & affect appropriate  Gastrointestinal:  Soft, Non-tender, + BS	  Skin: No rashes, No ecchymoses, No cyanosis	  Neurologic: Non-focal  Extremities: Normal range of motion, No clubbing, cyanosis or edema  Vascular: Peripheral pulses palpable 2+ bilaterally    MEDICATIONS  (STANDING):  cefuroxime   Tablet 250 milliGRAM(s) Oral every 12 hours  dextrose 5% + sodium chloride 0.45% with potassium chloride 20 mEq/L 1000 milliLiter(s) (75 mL/Hr) IV Continuous <Continuous>  heparin  Injectable 5000 Unit(s) SubCutaneous every 12 hours  lactobacillus acidophilus 1 Tablet(s) Oral three times a day with meals  metroNIDAZOLE    Tablet 500 milliGRAM(s) Oral every 8 hours  vancomycin    Solution 250 milliGRAM(s) Oral every 6 hours      	  LABS:	 	      11-24    141  |  103  |  11  ----------------------------<  112<H>  4.5   |  31  |  0.77    Ca    9.2      24 Nov 2017 07:53        Lipid Profile: Cholesterol 131  LDL 76  HDL 37  TG 91    HgA1c: Hemoglobin A1C, Whole Blood: 6.1 % (11-18 @ 09:35)    TSH: Thyroid Stimulating Hormone, Serum: 2.72 uU/mL (11-18 @ 08:19)
Meds:  metroNIDAZOLE  IVPB 500 milliGRAM(s) IV Intermittent every 8 hours   vancomycin    Solution 250 milliGRAM(s) Oral every 6 hours  Ceftin 250 mg po q 12 hours    Allergies:  Allergies    No Known Allergies    Intolerances        ROS  [  ] UNABLE TO ELICIT    General:  [  ] None  [  ] Fever  [  ] Chills  [ x ] Malaise    Skin:  [ x ] None [  ] Rash  [  ] Wound  [  ] Ulcer    HEENT:  [ x ] None  [  ] Sore Throat  [  ] Nasal congestion/ runny nose  [  ] Photophobia [  ] Neck pain      Chest:  [ x ] None   [  ] SOB  [  ] Cough  [  ] None    Cardiovascular:   [ x ] None  [  ] CP  [  ] Palpitation    Gastrointestinal:  [  ] None  [  ] Abd pain   [  ] Nausea    [  ] Vomiting   [ x ] Diarrhea	     Genitourinary:  [ x ] None [  ] Polyuria   [  ] Urgency  [  ] Frequency  [  ] Dysuria    [  ]  Hematuria       Musculoskeletal:  [ x ] None [  ] Back Pain	[  ] Body aches  [  ] Joint pain    Neurological: [  ] None [  ]Dizziness  [  ]Visual Disturbance  [  ]Headaches   [ x ]  generalized weakness          PHYSICAL EXAM:    Vital Signs Last 24 Hrs  T(C): 36.7 (24 Nov 2017 12:29), Max: 36.7 (24 Nov 2017 12:29)  T(F): 98.1 (24 Nov 2017 12:29), Max: 98.1 (24 Nov 2017 12:29)  HR: 90 (24 Nov 2017 12:29) (73 - 90)  BP: 119/78 (24 Nov 2017 12:29) (119/78 - 146/54)  BP(mean): --  RR: 17 (24 Nov 2017 12:29) (16 - 18)  SpO2: 97% (24 Nov 2017 12:29) (96% - 97%)    HEENT: [ x ] Wnl  [  ] Pharyngeal congestion    Neck:  [ x ] Supple  [  ]Lymphadenopathy  [  ] No JVD   [  ] JVD  [  ] Masses   [  ] WNL    CHEST/Respiratory:  [ x ]Clear to auscultation  [  ] Rales   [  ] Rhonchi   [  ] Wheezing     [  ] Chest Tenderness      Cardiovascular:  [ x ] Reg S1 S2   [  ] Irreg S1 S2   [ x ]No Murmur  [  ] +ve Murmurs  [  ]Systolic [  ]Diastolic      Abdomen:  [ x ] Soft  [  ] No tendrerness  [  ] Tenderness  [  ] Organomegaly  [  ] ABD Distention  [  ] Rigidity                       [ x ] No Regidity                       [ x ] No Rebound Tenderness  [  ] No Guarding Rigidity  [  ] Rebound Tenderness[  ] Guarding Rigidity                          [ x ]  +ve Bowel Sounds  [  ] Decreased Bowel Sounds    [  ] Absent Bowel Sounds                            Extremities: [ x ] No edema [  ] Edema  [  ] Clubbing   [  ] Cyanosis                         [ x ] No Tender Calf muscles  [  ] Tender Calf muscles                        [ x ] Palpable peripheral pulses    Neurological: [ x ] Awake  [ x ] Alert  [ x ] Oriented  x  3                           [  ] Confused  [  ] Drowzy  [  ] repond to painful stimuli  [  ] Unresponsive    Skin:  [  ] Intact [  ] Redness [  ] Thrombophlebitis  [  ] Rashes  [  ] Dry  [  ] Ulcers    Ortho:  [  ] Joint Swelling  [  ] Joint erythema [  ] Joint tenderness                [  ] Increased temp. to touch  [ x ] DJD [  ] WNL            LABS/DIAGNOSTIC TESTS                                     11.7   6.2   )-----------( 225      ( 21 Nov 2017 06:41 )             33.5   11-24    141  |  103  |  11  ----------------------------<  112<H>  4.5   |  31  |  0.77    Ca    9.2      24 Nov 2017 07:53        CULTURES:   Culture - Urine (11.19.17 @ 21:46)    -  Amikacin: S <=8    -  Ampicillin: R >16    -  Ampicillin/Sulbactam: R >16/8    -  Aztreonam: S <=4    -  Cefazolin: S <=2    -  Cefepime: S <=2    -  Cefoxitin: S <=4    -  Ceftazidime: S <=1    -  Ceftriaxone: S <=1    -  Ciprofloxacin: R >2    -  Ertapenem: S <=0.5    -  Gentamicin: S <=1    -  Imipenem: S <=1    -  Levofloxacin: R >4    -  Meropenem: S <=1    -  Nitrofurantoin: S <=32    -  Piperacillin/Tazobactam: S <=8    -  Tobramycin: S <=2    -  Trimethoprim/Sulfamethoxazole: S <=0.5/9.5    Specimen Source: .Urine Clean Catch (Midstream)    Culture Results:   50,000 - 99,000 CFU/mL Escherichia coli    Organism Identification: Escherichia coli    Organism: Escherichia coli    Method Type: CJ      Ova and Parasites (11.19.17 @ 21:40)    Culture Results:   Testing in progress    Culture - Stool (11.18.17 @ 09:48)    Specimen Source: .Stool Feces    Culture Results:   No enteric pathogens isolated.  (Stool culture examined for Salmonella,  Shigella, Campylobacter, Aeromonas, Plesiomonas,  Vibrio, E.coli O157 and Yersinia)    C Diff by PCR Result: Detected    RADIOLOGY  CXR:  None yet.      Assessment and Recommendation:   84 [place] F from home, ambulates independently, w/ no PMH HTN, HLD, Chronic LBBB p/w watery diarrhea x 5 weeks. Prior to onset patient had UTI treated w/ Ceftin. Patient has been seen at ED of [facility] twice; 10/27 found to have colitis 2/2 CT findings and prescribed Ciprofloxacin/Flagyl, completed the ABx; 11/13 prescribed Imodium.  Patient was admitted for colitis and was started on IV Flagyl.  Vancomycin solution was added PO and patient tolerates it.  urine culture came +ve for E COLI sensitive to Cefazolin, and Ceftriaxone.  Patient stated that she had dysuria but feels better (improving) since admission.     Problem/Recommendation - 1:  Problem: Colitis, and UTI.   Recommendation:   1- IV hydration.  2- Continue IV Flagyl.  3- Continue PO vancomycin.  4- Continue Ceftin but increase dose to 500 mg po q 12 hours for one week (till 11/11/28/17).  5- CBC & BMP follow up.    Problem/Recommendation - 2:  ·  Problem: HTN (hypertension).    Recommendation:   1- Monitor Blood pressure closely.  2- Blood pressure control.  3- BP. meds as per cardiology and primary care team.     Problem/Recommendation - 3:  ·  Problem: HLD (hyperlipidemia).    Recommendation:   1- low fat low cholesterol diet.  2- Lipid profile.  3- Cholesterol medication as per primary care team.     Discussed with Medical resident..
Meds:  metroNIDAZOLE  IVPB 500 milliGRAM(s) IV Intermittent every 8 hours   vancomycin    Solution 250 milliGRAM(s) Oral every 6 hours  Ceftin 250 mg po q 12 hours    Allergies:  Allergies    No Known Allergies    Intolerances        ROS  [  ] UNABLE TO ELICIT    General:  [  ] None  [  ] Fever  [  ] Chills  [ x ] Malaise    Skin:  [ x ] None [  ] Rash  [  ] Wound  [  ] Ulcer    HEENT:  [ x ] None  [  ] Sore Throat  [  ] Nasal congestion/ runny nose  [  ] Photophobia [  ] Neck pain      Chest:  [ x ] None   [  ] SOB  [  ] Cough  [  ] None    Cardiovascular:   [ x ] None  [  ] CP  [  ] Palpitation    Gastrointestinal:  [  ] None  [  ] Abd pain   [  ] Nausea    [  ] Vomiting   [ x ] Diarrhea	     Genitourinary:  [ x ] None [  ] Polyuria   [  ] Urgency  [  ] Frequency  [  ] Dysuria    [  ]  Hematuria       Musculoskeletal:  [ x ] None [  ] Back Pain	[  ] Body aches  [  ] Joint pain    Neurological: [  ] None [  ]Dizziness  [  ]Visual Disturbance  [  ]Headaches   [ x ]  generalized weakness          PHYSICAL EXAM:    Vital Signs Last 24 Hrs  T(C): 36.9 (23 Nov 2017 12:05), Max: 36.9 (23 Nov 2017 12:05)  T(F): 98.4 (23 Nov 2017 12:05), Max: 98.4 (23 Nov 2017 12:05)  HR: 79 (23 Nov 2017 12:05) (74 - 79)  BP: 132/64 (23 Nov 2017 12:05) (132/64 - 162/85)  BP(mean): --  RR: 18 (23 Nov 2017 12:05) (18 - 18)  SpO2: 95% (23 Nov 2017 12:05) (95% - 95%)      HEENT: [ x ] Wnl  [  ] Pharyngeal congestion    Neck:  [ x ] Supple  [  ]Lymphadenopathy  [  ] No JVD   [  ] JVD  [  ] Masses   [  ] WNL    CHEST/Respiratory:  [ x ]Clear to auscultation  [  ] Rales   [  ] Rhonchi   [  ] Wheezing     [  ] Chest Tenderness      Cardiovascular:  [ x ] Reg S1 S2   [  ] Irreg S1 S2   [ x ]No Murmur  [  ] +ve Murmurs  [  ]Systolic [  ]Diastolic      Abdomen:  [ x ] Soft  [  ] No tendrerness  [  ] Tenderness  [  ] Organomegaly  [  ] ABD Distention  [  ] Rigidity                       [ x ] No Regidity                       [ x ] No Rebound Tenderness  [  ] No Guarding Rigidity  [  ] Rebound Tenderness[  ] Guarding Rigidity                          [ x ]  +ve Bowel Sounds  [  ] Decreased Bowel Sounds    [  ] Absent Bowel Sounds                            Extremities: [ x ] No edema [  ] Edema  [  ] Clubbing   [  ] Cyanosis                         [ x ] No Tender Calf muscles  [  ] Tender Calf muscles                        [ x ] Palpable peripheral pulses    Neurological: [ x ] Awake  [ x ] Alert  [ x ] Oriented  x  3                           [  ] Confused  [  ] Drowzy  [  ] repond to painful stimuli  [  ] Unresponsive    Skin:  [  ] Intact [  ] Redness [  ] Thrombophlebitis  [  ] Rashes  [  ] Dry  [  ] Ulcers    Ortho:  [  ] Joint Swelling  [  ] Joint erythema [  ] Joint tenderness                [  ] Increased temp. to touch  [ x ] DJD [  ] WNL            LABS/DIAGNOSTIC TESTS                                     11.7   6.2   )-----------( 225      ( 21 Nov 2017 06:41 )             33.5   11-23    141  |  105  |  9   ----------------------------<  133<H>  4.6   |  30  |  0.76    Ca    8.9      23 Nov 2017 05:45              CULTURES:   Culture - Urine (11.19.17 @ 21:46)    -  Amikacin: S <=8    -  Ampicillin: R >16    -  Ampicillin/Sulbactam: R >16/8    -  Aztreonam: S <=4    -  Cefazolin: S <=2    -  Cefepime: S <=2    -  Cefoxitin: S <=4    -  Ceftazidime: S <=1    -  Ceftriaxone: S <=1    -  Ciprofloxacin: R >2    -  Ertapenem: S <=0.5    -  Gentamicin: S <=1    -  Imipenem: S <=1    -  Levofloxacin: R >4    -  Meropenem: S <=1    -  Nitrofurantoin: S <=32    -  Piperacillin/Tazobactam: S <=8    -  Tobramycin: S <=2    -  Trimethoprim/Sulfamethoxazole: S <=0.5/9.5    Specimen Source: .Urine Clean Catch (Midstream)    Culture Results:   50,000 - 99,000 CFU/mL Escherichia coli    Organism Identification: Escherichia coli    Organism: Escherichia coli    Method Type: CJ      Ova and Parasites (11.19.17 @ 21:40)    Culture Results:   Testing in progress    Culture - Stool (11.18.17 @ 09:48)    Specimen Source: .Stool Feces    Culture Results:   No enteric pathogens isolated.  (Stool culture examined for Salmonella,  Shigella, Campylobacter, Aeromonas, Plesiomonas,  Vibrio, E.coli O157 and Yersinia)    C Diff by PCR Result: Detected    RADIOLOGY  CXR:  None yet.      Assessment and Recommendation:   84 [place] F from home, ambulates independently, w/ no PMH HTN, HLD, Chronic LBBB p/w watery diarrhea x 5 weeks. Prior to onset patient had UTI treated w/ Ceftin. Patient has been seen at ED of [facility] twice; 10/27 found to have colitis 2/2 CT findings and prescribed Ciprofloxacin/Flagyl, completed the ABx; 11/13 prescribed Imodium.  Patient was admitted for colitis and was started on IV Flagyl.  Vancomycin solution was added PO and patient tolerates it.  urine culture came +ve for E COLI sensitive to Cefazolin, and Ceftriaxone.  Patient stated that she had dysuria but feels better (improving) since admission.     Problem/Recommendation - 1:  Problem: Colitis, and UTI.   Recommendation:   1- IV hydration.  2- Continue IV Flagyl.  3- Continue PO vancomycin.  4- Continue Ceftin but increase dose to 500 mg po q 12 hours for one week (till 11/11/28/17).  5- CBC & BMP follow up.    Problem/Recommendation - 2:  ·  Problem: HTN (hypertension).    Recommendation:   1- Monitor Blood pressure closely.  2- Blood pressure control.  3- BP. meds as per cardiology and primary care team.     Problem/Recommendation - 3:  ·  Problem: HLD (hyperlipidemia).    Recommendation:   1- low fat low cholesterol diet.  2- Lipid profile.  3- Cholesterol medication as per primary care team.     Discussed with Medical resident..
Meds:  metroNIDAZOLE  IVPB 500 milliGRAM(s) IV Intermittent every 8 hours   vancomycin    Solution 250 milliGRAM(s) Oral every 6 hours  Ceftin 250 mg po q 12 hours    Allergies:  Allergies    No Known Allergies    Intolerances        ROS  [  ] UNABLE TO ELICIT    General:  [  ] None  [  ] Fever  [  ] Chills  [ x ] Malaise    Skin:  [ x ] None [  ] Rash  [  ] Wound  [  ] Ulcer    HEENT:  [ x ] None  [  ] Sore Throat  [  ] Nasal congestion/ runny nose  [  ] Photophobia [  ] Neck pain      Chest:  [ x ] None   [  ] SOB  [  ] Cough  [  ] None    Cardiovascular:   [ x ] None  [  ] CP  [  ] Palpitation    Gastrointestinal:  [  ] None  [  ] Abd pain   [  ] Nausea    [  ] Vomiting   [ x ] Diarrhea	     Genitourinary:  [ x ] None [  ] Polyuria   [  ] Urgency  [  ] Frequency  [  ] Dysuria    [  ]  Hematuria       Musculoskeletal:  [ x ] None [  ] Back Pain	[  ] Body aches  [  ] Joint pain    Neurological: [  ] None [  ]Dizziness  [  ]Visual Disturbance  [  ]Headaches   [ x ]  generalized weakness          PHYSICAL EXAM:  Vital Signs Last 24 Hrs  T(C): 36.6 (25 Nov 2017 14:29), Max: 36.9 (25 Nov 2017 06:04)  T(F): 97.8 (25 Nov 2017 14:29), Max: 98.4 (25 Nov 2017 06:04)  HR: 88 (25 Nov 2017 14:29) (77 - 88)  BP: 115/72 (25 Nov 2017 14:29) (115/56 - 141/773)  BP(mean): --  RR: 14 (25 Nov 2017 14:29) (14 - 16)  SpO2: 97% (25 Nov 2017 14:29) (97% - 98%)    HEENT: [ x ] Wnl  [  ] Pharyngeal congestion    Neck:  [ x ] Supple  [  ]Lymphadenopathy  [  ] No JVD   [  ] JVD  [  ] Masses   [  ] WNL    CHEST/Respiratory:  [ x ]Clear to auscultation  [  ] Rales   [  ] Rhonchi   [  ] Wheezing     [  ] Chest Tenderness      Cardiovascular:  [ x ] Reg S1 S2   [  ] Irreg S1 S2   [ x ]No Murmur  [  ] +ve Murmurs  [  ]Systolic [  ]Diastolic      Abdomen:  [ x ] Soft  [  ] No tendrerness  [  ] Tenderness  [  ] Organomegaly  [  ] ABD Distention  [  ] Rigidity                       [ x ] No Regidity                       [ x ] No Rebound Tenderness  [  ] No Guarding Rigidity  [  ] Rebound Tenderness[  ] Guarding Rigidity                          [ x ]  +ve Bowel Sounds  [  ] Decreased Bowel Sounds    [  ] Absent Bowel Sounds                            Extremities: [ x ] No edema [  ] Edema  [  ] Clubbing   [  ] Cyanosis                         [ x ] No Tender Calf muscles  [  ] Tender Calf muscles                        [ x ] Palpable peripheral pulses    Neurological: [ x ] Awake  [ x ] Alert  [ x ] Oriented  x  3                           [  ] Confused  [  ] Drowzy  [  ] repond to painful stimuli  [  ] Unresponsive    Skin:  [  ] Intact [  ] Redness [  ] Thrombophlebitis  [  ] Rashes  [  ] Dry  [  ] Ulcers    Ortho:  [  ] Joint Swelling  [  ] Joint erythema [  ] Joint tenderness                [  ] Increased temp. to touch  [ x ] DJD [  ] WNL            LABS/DIAGNOSTIC TESTS                                     11.7   6.2   )-----------( 225      ( 21 Nov 2017 06:41 )             33.5   11-25    139  |  103  |  12  ----------------------------<  105<H>  4.4   |  28  |  0.68    Ca    9.2      25 Nov 2017 08:20        CULTURES:   Culture - Urine (11.19.17 @ 21:46)    -  Amikacin: S <=8    -  Ampicillin: R >16    -  Ampicillin/Sulbactam: R >16/8    -  Aztreonam: S <=4    -  Cefazolin: S <=2    -  Cefepime: S <=2    -  Cefoxitin: S <=4    -  Ceftazidime: S <=1    -  Ceftriaxone: S <=1    -  Ciprofloxacin: R >2    -  Ertapenem: S <=0.5    -  Gentamicin: S <=1    -  Imipenem: S <=1    -  Levofloxacin: R >4    -  Meropenem: S <=1    -  Nitrofurantoin: S <=32    -  Piperacillin/Tazobactam: S <=8    -  Tobramycin: S <=2    -  Trimethoprim/Sulfamethoxazole: S <=0.5/9.5    Specimen Source: .Urine Clean Catch (Midstream)    Culture Results:   50,000 - 99,000 CFU/mL Escherichia coli    Organism Identification: Escherichia coli    Organism: Escherichia coli    Method Type: CJ      Ova and Parasites (11.19.17 @ 21:40)    Culture Results:   Testing in progress    Culture - Stool (11.18.17 @ 09:48)    Specimen Source: .Stool Feces    Culture Results:   No enteric pathogens isolated.  (Stool culture examined for Salmonella,  Shigella, Campylobacter, Aeromonas, Plesiomonas,  Vibrio, E.coli O157 and Yersinia)    C Diff by PCR Result: Detected    RADIOLOGY  CXR:  None yet.      Assessment and Recommendation:   84 [place] F from home, ambulates independently, w/ no PMH HTN, HLD, Chronic LBBB p/w watery diarrhea x 5 weeks. Prior to onset patient had UTI treated w/ Ceftin. Patient has been seen at ED of [facility] twice; 10/27 found to have colitis 2/2 CT findings and prescribed Ciprofloxacin/Flagyl, completed the ABx; 11/13 prescribed Imodium.  Patient was admitted for colitis and was started on IV Flagyl.  Vancomycin solution was added PO and patient tolerates it.  urine culture came +ve for E COLI sensitive to Cefazolin, and Ceftriaxone.  Patient stated that she had dysuria but feels better (improving) since admission.     Problem/Recommendation - 1:  Problem: Colitis, and UTI.   Recommendation:   1- IV hydration.  2- Continue IV Flagyl.  3- Continue PO vancomycin.  4- Continue Ceftin 500 mg po q 12 hours for one week (till 11/28/17).  5- CBC & BMP follow up.    Problem/Recommendation - 2:  ·  Problem: HTN (hypertension).    Recommendation:   1- Monitor Blood pressure closely.  2- Blood pressure control.  3- BP. meds as per cardiology and primary care team.     Problem/Recommendation - 3:  ·  Problem: HLD (hyperlipidemia).    Recommendation:   1- low fat low cholesterol diet.  2- Lipid profile.  3- Cholesterol medication as per primary care team.     Discussed with Medical resident, and PCP.
Meds:  metroNIDAZOLE  IVPB 500 milliGRAM(s) IV Intermittent every 8 hours   vancomycin    Solution 250 milliGRAM(s) Oral every 6 hours  Ceftin 500 mg po q 12 hours    Allergies:  Allergies    No Known Allergies    Intolerances        ROS  [  ] UNABLE TO ELICIT    General:  [  ] None  [  ] Fever  [  ] Chills  [ x ] Malaise    Skin:  [ x ] None [  ] Rash  [  ] Wound  [  ] Ulcer    HEENT:  [ x ] None  [  ] Sore Throat  [  ] Nasal congestion/ runny nose  [  ] Photophobia [  ] Neck pain      Chest:  [ x ] None   [  ] SOB  [  ] Cough  [  ] None    Cardiovascular:   [ x ] None  [  ] CP  [  ] Palpitation    Gastrointestinal:  [  ] None  [  ] Abd pain   [  ] Nausea    [  ] Vomiting   [ x ] Diarrhea	     Genitourinary:  [ x ] None [  ] Polyuria   [  ] Urgency  [  ] Frequency  [  ] Dysuria    [  ]  Hematuria       Musculoskeletal:  [ x ] None [  ] Back Pain	[  ] Body aches  [  ] Joint pain    Neurological: [  ] None [  ]Dizziness  [  ]Visual Disturbance  [  ]Headaches   [ x ]  generalized weakness          PHYSICAL EXAM:  Vital Signs Last 24 Hrs  T(C): 36.9 (26 Nov 2017 06:12), Max: 36.9 (25 Nov 2017 21:39)  T(F): 98.5 (26 Nov 2017 06:12), Max: 98.5 (25 Nov 2017 21:39)  HR: 74 (26 Nov 2017 06:12) (74 - 88)  BP: 130/66 (26 Nov 2017 06:12) (112/50 - 130/66)  BP(mean): --  RR: 16 (26 Nov 2017 06:12) (14 - 16)  SpO2: 98% (26 Nov 2017 06:12) (97% - 99%)    HEENT: [ x ] Wnl  [  ] Pharyngeal congestion    Neck:  [ x ] Supple  [  ]Lymphadenopathy  [  ] No JVD   [  ] JVD  [  ] Masses   [  ] WNL    CHEST/Respiratory:  [ x ]Clear to auscultation  [  ] Rales   [  ] Rhonchi   [  ] Wheezing     [  ] Chest Tenderness      Cardiovascular:  [ x ] Reg S1 S2   [  ] Irreg S1 S2   [ x ]No Murmur  [  ] +ve Murmurs  [  ]Systolic [  ]Diastolic      Abdomen:  [ x ] Soft  [  ] No tendrerness  [  ] Tenderness  [  ] Organomegaly  [  ] ABD Distention  [  ] Rigidity                       [ x ] No Regidity                       [ x ] No Rebound Tenderness  [  ] No Guarding Rigidity  [  ] Rebound Tenderness[  ] Guarding Rigidity                          [ x ]  +ve Bowel Sounds  [  ] Decreased Bowel Sounds    [  ] Absent Bowel Sounds                            Extremities: [ x ] No edema [  ] Edema  [  ] Clubbing   [  ] Cyanosis                         [ x ] No Tender Calf muscles  [  ] Tender Calf muscles                        [ x ] Palpable peripheral pulses    Neurological: [ x ] Awake  [ x ] Alert  [ x ] Oriented  x  3                           [  ] Confused  [  ] Drowzy  [  ] repond to painful stimuli  [  ] Unresponsive    Skin:  [  ] Intact [  ] Redness [  ] Thrombophlebitis  [  ] Rashes  [  ] Dry  [  ] Ulcers    Ortho:  [  ] Joint Swelling  [  ] Joint erythema [  ] Joint tenderness                [  ] Increased temp. to touch  [ x ] DJD [  ] WNL            LABS/DIAGNOSTIC TESTS                                     11.7   6.2   )-----------( 225      ( 21 Nov 2017 06:41 )             33.5   11-25    139  |  103  |  12  ----------------------------<  105<H>  4.4   |  28  |  0.68    Ca    9.2      25 Nov 2017 08:20        CULTURES:   Culture - Urine (11.19.17 @ 21:46)    -  Amikacin: S <=8    -  Ampicillin: R >16    -  Ampicillin/Sulbactam: R >16/8    -  Aztreonam: S <=4    -  Cefazolin: S <=2    -  Cefepime: S <=2    -  Cefoxitin: S <=4    -  Ceftazidime: S <=1    -  Ceftriaxone: S <=1    -  Ciprofloxacin: R >2    -  Ertapenem: S <=0.5    -  Gentamicin: S <=1    -  Imipenem: S <=1    -  Levofloxacin: R >4    -  Meropenem: S <=1    -  Nitrofurantoin: S <=32    -  Piperacillin/Tazobactam: S <=8    -  Tobramycin: S <=2    -  Trimethoprim/Sulfamethoxazole: S <=0.5/9.5    Specimen Source: .Urine Clean Catch (Midstream)    Culture Results:   50,000 - 99,000 CFU/mL Escherichia coli    Organism Identification: Escherichia coli    Organism: Escherichia coli    Method Type: CJ      Ova and Parasites (11.19.17 @ 21:40)    Culture Results:   Testing in progress    Culture - Stool (11.18.17 @ 09:48)    Specimen Source: .Stool Feces    Culture Results:   No enteric pathogens isolated.  (Stool culture examined for Salmonella,  Shigella, Campylobacter, Aeromonas, Plesiomonas,  Vibrio, E.coli O157 and Yersinia)    C Diff by PCR Result: Detected    RADIOLOGY  CXR:  None yet.      Assessment and Recommendation:   84 [place]har[place] F from home, ambulates independently, w/ no PMH HTN, HLD, Chronic LBBB p/w watery diarrhea x 5 weeks. Prior to onset patient had UTI treated w/ Ceftin. Patient has been seen at ED of [facility] twice; 10/27 found to have colitis 2/2 CT findings and prescribed Ciprofloxacin/Flagyl, completed the ABx; 11/13 prescribed Imodium.  Patient was admitted for colitis and was started on IV Flagyl.  Vancomycin solution was added PO and patient tolerates it.  urine culture came +ve for E COLI sensitive to Cefazolin, and Ceftriaxone.  Patient stated that she had dysuria but feels better (improving) since admission.   Patient is on po Ceftin for UTI and is clinically stable, and continue to feel better.    Problem/Recommendation - 1:  Problem: Colitis, and UTI.   Recommendation:   1- IV hydration.  2- Continue IV Flagyl to change to po upon discharge to finish total 14 days.  3- Continue PO vancomycin for total 14 days.  4- Continue Ceftin 500 mg po q 12 hours for one week (till 11/28/17).  5- CBC & BMP follow up.    Problem/Recommendation - 2:  ·  Problem: HTN (hypertension).    Recommendation:   1- Monitor Blood pressure closely.  2- Blood pressure control.  3- BP. meds as per cardiology and primary care team.     Problem/Recommendation - 3:  ·  Problem: HLD (hyperlipidemia).    Recommendation:   1- low fat low cholesterol diet.  2- Lipid profile.  3- Cholesterol medication as per primary care team.     Discussed with Medical resident, and PCP.
Meds:  metroNIDAZOLE  IVPB 500 milliGRAM(s) IV Intermittent every 8 hours  metroNIDAZOLE  IVPB      vancomycin    Solution 250 milliGRAM(s) Oral every 6 hours    Allergies:  Allergies    No Known Allergies    Intolerances        ROS  [  ] UNABLE TO ELICIT    General:  [  ] None  [  ] Fever  [  ] Chills  [ x ] Malaise    Skin:  [ x ] None [  ] Rash  [  ] Wound  [  ] Ulcer    HEENT:  [ x ] None  [  ] Sore Throat  [  ] Nasal congestion/ runny nose  [  ] Photophobia [  ] Neck pain      Chest:  [ x ] None   [  ] SOB  [  ] Cough  [  ] None    Cardiovascular:   [ x ] None  [  ] CP  [  ] Palpitation    Gastrointestinal:  [  ] None  [  ] Abd pain   [  ] Nausea    [  ] Vomiting   [ x ] Diarrhea	     Genitourinary:  [ x ] None [  ] Polyuria   [  ] Urgency  [  ] Frequency  [  ] Dysuria    [  ]  Hematuria       Musculoskeletal:  [ x ] None [  ] Back Pain	[  ] Body aches  [  ] Joint pain    Neurological: [  ] None [  ]Dizziness  [  ]Visual Disturbance  [  ]Headaches   [ x ]  generalized weakness          PHYSICAL EXAM:    Vital Signs Last 24 Hrs  T(C): 36.2 (20 Nov 2017 05:49), Max: 36.9 (19 Nov 2017 21:38)  T(F): 97.2 (20 Nov 2017 05:49), Max: 98.4 (19 Nov 2017 21:38)  HR: 78 (20 Nov 2017 05:49) (71 - 79)  BP: 148/58 (20 Nov 2017 05:49) (114/54 - 150/55)  BP(mean): --  RR: 14 (20 Nov 2017 05:49) (14 - 18)  SpO2: 98% (20 Nov 2017 05:49) (97% - 98%)    HEENT: [ x ] Wnl  [  ] Pharyngeal congestion    Neck:  [ x ] Supple  [  ]Lymphadenopathy  [  ] No JVD   [  ] JVD  [  ] Masses   [  ] WNL    CHEST/Respiratory:  [ x ]Clear to auscultation  [  ] Rales   [  ] Rhonchi   [  ] Wheezing     [  ] Chest Tenderness      Cardiovascular:  [ x ] Reg S1 S2   [  ] Irreg S1 S2   [ x ]No Murmur  [  ] +ve Murmurs  [  ]Systolic [  ]Diastolic      Abdomen:  [ x ] Soft  [  ] No tendrerness  [  ] Tenderness  [  ] Organomegaly  [  ] ABD Distention  [  ] Rigidity                       [ x ] No Regidity                       [ x ] No Rebound Tenderness  [  ] No Guarding Rigidity  [  ] Rebound Tenderness[  ] Guarding Rigidity                          [ x ]  +ve Bowel Sounds  [  ] Decreased Bowel Sounds    [  ] Absent Bowel Sounds                            Extremities: [ x ] No edema [  ] Edema  [  ] Clubbing   [  ] Cyanosis                         [ x ] No Tender Calf muscles  [  ] Tender Calf muscles                        [ x ] Palpable peripheral pulses    Neurological: [ x ] Awake  [ x ] Alert  [ x ] Oriented  x  3                           [  ] Confused  [  ] Drowzy  [  ] repond to painful stimuli  [  ] Unresponsive    Skin:  [  ] Intact [  ] Redness [  ] Thrombophlebitis  [  ] Rashes  [  ] Dry  [  ] Ulcers    Ortho:  [  ] Joint Swelling  [  ] Joint erythema [  ] Joint tenderness                [  ] Increased temp. to touch  [ x ] DJD [  ] WNL            LABS/DIAGNOSTIC TESTS                          10.8   5.2   )-----------( 221      ( 20 Nov 2017 06:53 )             32.7         11-20    143  |  108  |  4<L>  ----------------------------<  124<H>  4.2   |  28  |  0.69    Ca    8.9      20 Nov 2017 06:53              CULTURES:     Ova and Parasites (11.19.17 @ 21:40)    Culture Results:   Testing in progress    Culture - Stool (11.18.17 @ 09:48)    Specimen Source: .Stool Feces    Culture Results:   No enteric pathogens isolated.  (Stool culture examined for Salmonella,  Shigella, Campylobacter, Aeromonas, Plesiomonas,  Vibrio, E.coli O157 and Yersinia)    C Diff by PCR Result: Detected    RADIOLOGY  CXR:  None yet.      Assessment and Recommendation:   84 [place] F from home, ambulates independently, w/ no PMH HTN, HLD, Chronic LBBB p/w watery diarrhea x 5 weeks. Prior to onset patient had UTI treated w/ Ceftin. Patient has been seen at ED of [facility] twice; 10/27 found to have colitis 2/2 CT findings and prescribed Ciprofloxacin/Flagyl, completed the ABx; 11/13 prescribed Imodium.  Patient was admitted for colitis and was started on IV Flagyl.  Vancomycin solution was added PO and patient tolerates it.    Problem/Recommendation - 1:  Problem: Colitis.   Recommendation:   1- IV hydration.  2- Continue IV Flagyl.  3- Continue PO vancomycin.  4- Follow urine culture.  5- CBC & BMP follow up.    Problem/Recommendation - 2:  ·  Problem: HTN (hypertension).    Recommendation:   1- Monitor Blood pressure closely.  2- Blood pressure control.  3- BP. meds as per cardiology and primary care team.     Problem/Recommendation - 3:  ·  Problem: HLD (hyperlipidemia).    Recommendation:   1- low fat low cholesterol diet.  2- Lipid profile.  3- Cholesterol medication as per primary care team.     Discussed with NP and with Patient.
Meds:  metroNIDAZOLE  IVPB 500 milliGRAM(s) IV Intermittent every 8 hours  metroNIDAZOLE  IVPB      vancomycin    Solution 250 milliGRAM(s) Oral every 6 hours    Allergies:  Allergies    No Known Allergies    Intolerances        ROS  [  ] UNABLE TO ELICIT    General:  [  ] None  [  ] Fever  [  ] Chills  [ x ] Malaise    Skin:  [ x ] None [  ] Rash  [  ] Wound  [  ] Ulcer    HEENT:  [ x ] None  [  ] Sore Throat  [  ] Nasal congestion/ runny nose  [  ] Photophobia [  ] Neck pain      Chest:  [ x ] None   [  ] SOB  [  ] Cough  [  ] None    Cardiovascular:   [ x ] None  [  ] CP  [  ] Palpitation    Gastrointestinal:  [  ] None  [  ] Abd pain   [  ] Nausea    [  ] Vomiting   [ x ] Diarrhea	     Genitourinary:  [ x ] None [  ] Polyuria   [  ] Urgency  [  ] Frequency  [  ] Dysuria    [  ]  Hematuria       Musculoskeletal:  [ x ] None [  ] Back Pain	[  ] Body aches  [  ] Joint pain    Neurological: [  ] None [  ]Dizziness  [  ]Visual Disturbance  [  ]Headaches   [ x ]  generalized weakness          PHYSICAL EXAM:    Vital Signs Last 24 Hrs  T(C): 36.7 (22 Nov 2017 14:31), Max: 36.7 (22 Nov 2017 14:31)  T(F): 98.1 (22 Nov 2017 14:31), Max: 98.1 (22 Nov 2017 14:31)  HR: 82 (22 Nov 2017 14:31) (74 - 82)  BP: 125/75 (22 Nov 2017 14:31) (122/71 - 143/75)  BP(mean): --  RR: 15 (22 Nov 2017 14:31) (14 - 16)  SpO2: 95% (22 Nov 2017 14:31) (95% - 99%)    HEENT: [ x ] Wnl  [  ] Pharyngeal congestion    Neck:  [ x ] Supple  [  ]Lymphadenopathy  [  ] No JVD   [  ] JVD  [  ] Masses   [  ] WNL    CHEST/Respiratory:  [ x ]Clear to auscultation  [  ] Rales   [  ] Rhonchi   [  ] Wheezing     [  ] Chest Tenderness      Cardiovascular:  [ x ] Reg S1 S2   [  ] Irreg S1 S2   [ x ]No Murmur  [  ] +ve Murmurs  [  ]Systolic [  ]Diastolic      Abdomen:  [ x ] Soft  [  ] No tendrerness  [  ] Tenderness  [  ] Organomegaly  [  ] ABD Distention  [  ] Rigidity                       [ x ] No Regidity                       [ x ] No Rebound Tenderness  [  ] No Guarding Rigidity  [  ] Rebound Tenderness[  ] Guarding Rigidity                          [ x ]  +ve Bowel Sounds  [  ] Decreased Bowel Sounds    [  ] Absent Bowel Sounds                            Extremities: [ x ] No edema [  ] Edema  [  ] Clubbing   [  ] Cyanosis                         [ x ] No Tender Calf muscles  [  ] Tender Calf muscles                        [ x ] Palpable peripheral pulses    Neurological: [ x ] Awake  [ x ] Alert  [ x ] Oriented  x  3                           [  ] Confused  [  ] Drowzy  [  ] repond to painful stimuli  [  ] Unresponsive    Skin:  [  ] Intact [  ] Redness [  ] Thrombophlebitis  [  ] Rashes  [  ] Dry  [  ] Ulcers    Ortho:  [  ] Joint Swelling  [  ] Joint erythema [  ] Joint tenderness                [  ] Increased temp. to touch  [ x ] DJD [  ] WNL            LABS/DIAGNOSTIC TESTS                                     11.7   6.2   )-----------( 225      ( 21 Nov 2017 06:41 )             33.5   11-22    142  |  105  |  5<L>  ----------------------------<  123<H>  3.7   |  28  |  0.65    Ca    8.9      22 Nov 2017 08:16              CULTURES:   Culture - Urine (11.19.17 @ 21:46)    -  Amikacin: S <=8    -  Ampicillin: R >16    -  Ampicillin/Sulbactam: R >16/8    -  Aztreonam: S <=4    -  Cefazolin: S <=2    -  Cefepime: S <=2    -  Cefoxitin: S <=4    -  Ceftazidime: S <=1    -  Ceftriaxone: S <=1    -  Ciprofloxacin: R >2    -  Ertapenem: S <=0.5    -  Gentamicin: S <=1    -  Imipenem: S <=1    -  Levofloxacin: R >4    -  Meropenem: S <=1    -  Nitrofurantoin: S <=32    -  Piperacillin/Tazobactam: S <=8    -  Tobramycin: S <=2    -  Trimethoprim/Sulfamethoxazole: S <=0.5/9.5    Specimen Source: .Urine Clean Catch (Midstream)    Culture Results:   50,000 - 99,000 CFU/mL Escherichia coli    Organism Identification: Escherichia coli    Organism: Escherichia coli    Method Type: CJ      Ova and Parasites (11.19.17 @ 21:40)    Culture Results:   Testing in progress    Culture - Stool (11.18.17 @ 09:48)    Specimen Source: .Stool Feces    Culture Results:   No enteric pathogens isolated.  (Stool culture examined for Salmonella,  Shigella, Campylobacter, Aeromonas, Plesiomonas,  Vibrio, E.coli O157 and Yersinia)    C Diff by PCR Result: Detected    RADIOLOGY  CXR:  None yet.      Assessment and Recommendation:   84 [place] citizen F from home, ambulates independently, w/ no PMH HTN, HLD, Chronic LBBB p/w watery diarrhea x 5 weeks. Prior to onset patient had UTI treated w/ Ceftin. Patient has been seen at ED of [facility] twice; 10/27 found to have colitis 2/2 CT findings and prescribed Ciprofloxacin/Flagyl, completed the ABx; 11/13 prescribed Imodium.  Patient was admitted for colitis and was started on IV Flagyl.  Vancomycin solution was added PO and patient tolerates it.  urine culture came +ve for E COLI sensitive to Cefazolin.  Patient stated that she had dysuria but feels better (improving) since admission.     Problem/Recommendation - 1:  Problem: Colitis, and UTI.   Recommendation:   1- IV hydration.  2- Continue IV Flagyl.  3- Continue PO vancomycin.  4- Give Keflex 500 mg po q 8 hours for one week.  5- CBC & BMP follow up.    Problem/Recommendation - 2:  ·  Problem: HTN (hypertension).    Recommendation:   1- Monitor Blood pressure closely.  2- Blood pressure control.  3- BP. meds as per cardiology and primary care team.     Problem/Recommendation - 3:  ·  Problem: HLD (hyperlipidemia).    Recommendation:   1- low fat low cholesterol diet.  2- Lipid profile.  3- Cholesterol medication as per primary care team.     Discussed with NP and with Patient, and patient's son.
NP Note discussed with  Primary Attending    Patient is a 84y old  Female who presents with a chief complaint of Diarhrea X 5 weeks (18 Nov 2017 03:20)      INTERVAL HPI/OVERNIGHT EVENTS: no new complaints. Reports solid bm this am    MEDICATIONS  (STANDING):  dextrose 5% + sodium chloride 0.45% with potassium chloride 20 mEq/L 1000 milliLiter(s) (75 mL/Hr) IV Continuous <Continuous>  heparin  Injectable 5000 Unit(s) SubCutaneous every 12 hours  lactobacillus acidophilus 1 Tablet(s) Oral three times a day with meals  metroNIDAZOLE  IVPB 500 milliGRAM(s) IV Intermittent every 8 hours  metroNIDAZOLE  IVPB      pantoprazole    Tablet 40 milliGRAM(s) Oral before breakfast  vancomycin    Solution 250 milliGRAM(s) Oral every 6 hours    MEDICATIONS  (PRN):      __________________________________________________  REVIEW OF SYSTEMS:    CONSTITUTIONAL: No fever,   EYES: no acute visual disturbances  NECK: No pain or stiffness  RESPIRATORY: No cough; No shortness of breath  CARDIOVASCULAR: No chest pain, no palpitations  GASTROINTESTINAL: No pain. No nausea or vomiting; No diarrhea   NEUROLOGICAL: No headache or numbness, no tremors  MUSCULOSKELETAL: No joint pain, no muscle pain  GENITOURINARY: no dysuria, no frequency, no hesitancy  PSYCHIATRY: no depression , no anxiety  ALL OTHER  ROS negative        Vital Signs Last 24 Hrs  T(C): 36.8 (21 Nov 2017 06:08), Max: 36.8 (21 Nov 2017 06:08)  T(F): 98.2 (21 Nov 2017 06:08), Max: 98.2 (21 Nov 2017 06:08)  HR: 73 (21 Nov 2017 06:08) (73 - 82)  BP: 139/58 (21 Nov 2017 06:08) (139/58 - 142/75)  BP(mean): --  RR: 14 (21 Nov 2017 06:08) (14 - 16)  SpO2: 94% (21 Nov 2017 06:08) (94% - 98%)    ________________________________________________  PHYSICAL EXAM:  GENERAL: NAD  HEENT: Normocephalic;  conjunctivae and sclerae clear; moist mucous membranes;   NECK : supple  CHEST/LUNG: Clear to auscultation bilaterally with good air entry   HEART: S1 S2  regular; no murmurs, gallops or rubs  ABDOMEN: Soft, Nontender, Nondistended; Bowel sounds present  EXTREMITIES: no cyanosis; no edema; no calf tenderness  SKIN: warm and dry; no rash  NERVOUS SYSTEM:  Awake and alert; Oriented  to place, person and time ; no new deficits    _________________________________________________  LABS:                        11.7   6.2   )-----------( 225      ( 21 Nov 2017 06:41 )             33.5     11-21    142  |  105  |  4<L>  ----------------------------<  123<H>  3.8   |  29  |  0.69    Ca    8.9      21 Nov 2017 06:41          CAPILLARY BLOOD GLUCOSE            RADIOLOGY & ADDITIONAL TESTS:    Imaging  Reviewed:  YES/NO    Consultant(s) Notes Reviewed:   YES/ No      Plan of care was discussed with patient; all questions and concerns were addressed
NP Note discussed with  primary attending    83 y/o female with  PMH HTN, HLD, Chronic LBBB presented with  watery diarrhea x 5 weeks sp  UTI treated with Ceftin. Patient has been seen at ED of [facility] found to have colitis end of october. Pt with stool culture + c diff   Patient was admitted for colitis on Flagyl and PO vanco. Seen at bedside, sitting in chair. Verbalizes feeling better. Tolerating regular diet. No abd pain, no fever or leucocytosis. Reports 2 loose BMs today.                     INTERVAL HPI/OVERNIGHT EVENTS: no new complaints    MEDICATIONS  (STANDING):  dextrose 5% + sodium chloride 0.45% with potassium chloride 20 mEq/L 1000 milliLiter(s) (75 mL/Hr) IV Continuous <Continuous>  heparin  Injectable 5000 Unit(s) SubCutaneous every 12 hours  lactobacillus acidophilus 1 Tablet(s) Oral three times a day with meals  metroNIDAZOLE  IVPB 500 milliGRAM(s) IV Intermittent every 8 hours  metroNIDAZOLE  IVPB      pantoprazole    Tablet 40 milliGRAM(s) Oral before breakfast  vancomycin    Solution 250 milliGRAM(s) Oral every 6 hours    MEDICATIONS  (PRN):      __________________________________________________  REVIEW OF SYSTEMS:    CONSTITUTIONAL: No fever,   RESPIRATORY: No cough; No shortness of breath  CARDIOVASCULAR: No chest pain, no palpitations  GASTROINTESTINAL: No pain. No nausea or vomiting; No diarrhea   NEUROLOGICAL: No headache or numbness, no tremors  MUSCULOSKELETAL: No joint pain, no muscle pain  GENITOURINARY: no dysuria, no frequency, no hesitancy  ALL OTHER  ROS negative        Vital Signs Last 24 Hrs  T(C): 36.4 (20 Nov 2017 14:51), Max: 36.9 (19 Nov 2017 21:38)  T(F): 97.6 (20 Nov 2017 14:51), Max: 98.4 (19 Nov 2017 21:38)  HR: 82 (20 Nov 2017 14:51) (78 - 82)  BP: 142/75 (20 Nov 2017 14:51) (142/75 - 150/55)  BP(mean): --  RR: 16 (20 Nov 2017 14:51) (14 - 16)  SpO2: 98% (20 Nov 2017 14:51) (97% - 98%)    ________________________________________________  PHYSICAL EXAM:  GENERAL: NAD  CHEST/LUNG: Clear to ausculitation bilaterally with good air entry   HEART: S1 S2  regular   ABDOMEN: Soft, Nontender, Nondistended; Bowel sounds present  EXTREMITIES: no cyanosis; no edema; no calf tenderness  SKIN: warm and dry; no rash  NERVOUS SYSTEM:  Awake and alert; Oriented  to place, person and time ; no new deficits    _________________________________________________  LABS:                        10.8   5.2   )-----------( 221      ( 20 Nov 2017 06:53 )             32.7     11-20    143  |  108  |  4<L>  ----------------------------<  124<H>  4.2   |  28  |  0.69    Ca    8.9      20 Nov 2017 06:53          CAPILLARY BLOOD GLUCOSE            RADIOLOGY & ADDITIONAL TESTS:    Imaging Personally Reviewed:  YES/NO    Consultant(s) Notes Reviewed:   YES/ No    Care Discussed with Consultants :     Plan of care was discussed with patient and /or primary care giver; all questions and concerns were addressed and care was aligned with patient's wishes.
NP Note discussed with  Primary Attending    Patient is a 84y old  Female who presents with a chief complaint of Diarhrea X 5 weeks (18 Nov 2017 03:20)      INTERVAL HPI/OVERNIGHT EVENTS: no new complaints, reports she still has 'loose stool" x1 this am. 2 bm yesterday.     MEDICATIONS  (STANDING):  dextrose 5% + sodium chloride 0.45% with potassium chloride 20 mEq/L 1000 milliLiter(s) (75 mL/Hr) IV Continuous <Continuous>  heparin  Injectable 5000 Unit(s) SubCutaneous every 12 hours  lactobacillus acidophilus 1 Tablet(s) Oral three times a day with meals  metroNIDAZOLE  IVPB 500 milliGRAM(s) IV Intermittent every 8 hours  metroNIDAZOLE  IVPB      pantoprazole    Tablet 40 milliGRAM(s) Oral before breakfast  vancomycin    Solution 250 milliGRAM(s) Oral every 6 hours    MEDICATIONS  (PRN):      __________________________________________________  REVIEW OF SYSTEMS:    CONSTITUTIONAL: No fever,   EYES: no acute visual disturbances  NECK: No pain or stiffness  RESPIRATORY: No cough; No shortness of breath  CARDIOVASCULAR: No chest pain, no palpitations  GASTROINTESTINAL: No pain. No nausea or vomiting; +loose stool  NEUROLOGICAL: No headache or numbness, no tremors  MUSCULOSKELETAL: No joint pain, no muscle pain  GENITOURINARY: no dysuria, no frequency, no hesitancy  PSYCHIATRY: no depression , no anxiety  ALL OTHER  ROS negative        Vital Signs Last 24 Hrs  T(C): 36.4 (22 Nov 2017 05:58), Max: 36.7 (21 Nov 2017 13:10)  T(F): 97.6 (22 Nov 2017 05:58), Max: 98 (21 Nov 2017 13:10)  HR: 81 (22 Nov 2017 09:33) (74 - 82)  BP: 122/71 (22 Nov 2017 09:33) (122/71 - 143/75)  BP(mean): --  RR: 14 (22 Nov 2017 05:58) (14 - 16)  SpO2: 99% (22 Nov 2017 09:33) (95% - 99%)    ________________________________________________  PHYSICAL EXAM:  GENERAL: NAD  HEENT: Normocephalic;  conjunctivae and sclerae clear; moist mucous membranes;   NECK : supple  CHEST/LUNG: Clear to auscultation bilaterally with good air entry   HEART: S1 S2  regular; no murmurs, gallops or rubs  ABDOMEN: Soft, Nontender, Nondistended; Bowel sounds present  EXTREMITIES: no cyanosis; no edema; no calf tenderness  SKIN: warm and dry; no rash  NERVOUS SYSTEM:  Awake and alert; Oriented  to place, person and time ; no new deficits    _________________________________________________  LABS:                        11.7   6.2   )-----------( 225      ( 21 Nov 2017 06:41 )             33.5     11-22    142  |  105  |  5<L>  ----------------------------<  123<H>  3.7   |  28  |  0.65    Ca    8.9      22 Nov 2017 08:16          CAPILLARY BLOOD GLUCOSE            RADIOLOGY & ADDITIONAL TESTS:    Imaging  Reviewed:  YES    Consultant(s) Notes Reviewed:   YES      Plan of care was discussed with patient ; all questions and concerns were addressed
pt intubated and sedated, no family at the bedside.

## 2017-12-19 PROCEDURE — 85730 THROMBOPLASTIN TIME PARTIAL: CPT

## 2017-12-19 PROCEDURE — 83550 IRON BINDING TEST: CPT

## 2017-12-19 PROCEDURE — 87186 SC STD MICRODIL/AGAR DIL: CPT

## 2017-12-19 PROCEDURE — 83690 ASSAY OF LIPASE: CPT

## 2017-12-19 PROCEDURE — 80061 LIPID PANEL: CPT

## 2017-12-19 PROCEDURE — 84443 ASSAY THYROID STIM HORMONE: CPT

## 2017-12-19 PROCEDURE — 84100 ASSAY OF PHOSPHORUS: CPT

## 2017-12-19 PROCEDURE — 87177 OVA AND PARASITES SMEARS: CPT

## 2017-12-19 PROCEDURE — 81001 URINALYSIS AUTO W/SCOPE: CPT

## 2017-12-19 PROCEDURE — 87493 C DIFF AMPLIFIED PROBE: CPT

## 2017-12-19 PROCEDURE — 87045 FECES CULTURE AEROBIC BACT: CPT

## 2017-12-19 PROCEDURE — 85610 PROTHROMBIN TIME: CPT

## 2017-12-19 PROCEDURE — 82378 CARCINOEMBRYONIC ANTIGEN: CPT

## 2017-12-19 PROCEDURE — 99285 EMERGENCY DEPT VISIT HI MDM: CPT

## 2017-12-19 PROCEDURE — 82607 VITAMIN B-12: CPT

## 2017-12-19 PROCEDURE — 80048 BASIC METABOLIC PNL TOTAL CA: CPT

## 2017-12-19 PROCEDURE — 83993 ASSAY FOR CALPROTECTIN FECAL: CPT

## 2017-12-19 PROCEDURE — 87046 STOOL CULTR AEROBIC BACT EA: CPT

## 2017-12-19 PROCEDURE — 85027 COMPLETE CBC AUTOMATED: CPT

## 2017-12-19 PROCEDURE — 82728 ASSAY OF FERRITIN: CPT

## 2017-12-19 PROCEDURE — 82306 VITAMIN D 25 HYDROXY: CPT

## 2017-12-19 PROCEDURE — 82272 OCCULT BLD FECES 1-3 TESTS: CPT

## 2017-12-19 PROCEDURE — 97161 PT EVAL LOW COMPLEX 20 MIN: CPT

## 2017-12-19 PROCEDURE — 87086 URINE CULTURE/COLONY COUNT: CPT

## 2017-12-19 PROCEDURE — 83605 ASSAY OF LACTIC ACID: CPT

## 2017-12-19 PROCEDURE — 93005 ELECTROCARDIOGRAM TRACING: CPT

## 2017-12-19 PROCEDURE — 80053 COMPREHEN METABOLIC PANEL: CPT

## 2017-12-19 PROCEDURE — 83735 ASSAY OF MAGNESIUM: CPT

## 2017-12-19 PROCEDURE — 83036 HEMOGLOBIN GLYCOSYLATED A1C: CPT

## 2020-09-15 NOTE — INPATIENT CERTIFICATION FOR MEDICARE PATIENTS - PHYSICIAN CONCUR
[facility]  [address] [facility] [place], [address]  Phone: [phone]      Fax:  [phone]    Progress Note    Name: Phylicia Madera   : 1981   MD: Jaime Lema MD       Treatment Diagnosis: Other abnormalities of gait and mobility [R26.89]  Bilateral knee pain [M25.561, M25.562]  Start of Care: 2020    Visits from Start of Care: 13  Missed Visits: 0    Summary of Care:Patient evaluated and treated for B LE weakness and gait abnormality. Patient also c/o L lateral thigh pain, but reports pain level is improving. Treatment included manual treatment for soft tissue mobilization, therapeutic exercise for LE/core strengthening and flexibility, and modalities including moist heat for pain relief. Patient demonstrates improvement in bed mobility and mild improvement in LE strength. Think minimal progress due to severe strength deficits at initial evaluation and MS diagnosis. Pt now ambulating with rollator vs. SPC in order to decrease fall risk. Improved FOTO score from 40 to 53 since initial evaluation. Assessment / Recommendations:   Progress towards goals / Updated goals:  Pt will be independent in HEP in order to maintain gains made throughout PT episode. Met  Pt will demonstrate B ankle AROM DF to at least 0 in prep for improved gait mechanics. Making progress towards  Pt will demonstrate at least 4/5 strength for all LE MMT in prep for improved functional mobility. Not met     Long Term Goals: To be accomplished in 32 treatments:  Pt will demonstrate only mild difficulty with all bed mobility and transfers in order to increase functional mobility. Making progress towards  Pt will demonstrate passing score on appropriate balance test in order to decrease fall risk. Not met  Pt will demonstrate ability to ambulate 500ft. With appropriate AD with minimal difficulty in order to allow for community ambulation.  Making progress towards     Other: Continue PT 1-2x/week      Batsheva Bryant , PT, DPT, OCS, CMTPT 9/15/2020     ________________________________________________________________________  NOTE TO PHYSICIAN:  Please complete the following and fax to: [facility]: Fax: [phone]. Karen Badillo Retain this original for your records. If you are unable to process this request in 24 hours, please contact our office.        ____ I have read the above report and request that my patient continue therapy with the following changes/special instructions:  ____ I have read the above report and request that my patient be discharged from therapy    Physician's Signature:_________________ Date:___________Time:__________ I concur with the Admission Order and I certify that services are provided in accordance with Section 42 CFR § 412.3

## 2021-12-29 NOTE — PHYSICAL THERAPY INITIAL EVALUATION ADULT - GAIT DEVIATIONS NOTED, PT EVAL
steady gait Prednisone Counseling:  I discussed with the patient the risks of prolonged use of prednisone including but not limited to weight gain, insomnia, osteoporosis, mood changes, diabetes, susceptibility to infection, glaucoma and high blood pressure.  In cases where prednisone use is prolonged, patients should be monitored with blood pressure checks, serum glucose levels and an eye exam.  Additionally, the patient may need to be placed on GI prophylaxis, PCP prophylaxis, and calcium and vitamin D supplementation and/or a bisphosphonate.  The patient verbalized understanding of the proper use and the possible adverse effects of prednisone.  All of the patient's questions and concerns were addressed.

## 2022-03-13 ENCOUNTER — INPATIENT (INPATIENT)
Facility: HOSPITAL | Age: 87
LOS: 3 days | DRG: 23 | End: 2022-03-17
Attending: PSYCHIATRY & NEUROLOGY | Admitting: INTERNAL MEDICINE
Payer: MEDICARE

## 2022-03-13 ENCOUNTER — APPOINTMENT (OUTPATIENT)
Dept: NEUROSURGERY | Facility: HOSPITAL | Age: 87
End: 2022-03-13

## 2022-03-13 ENCOUNTER — EMERGENCY (EMERGENCY)
Facility: HOSPITAL | Age: 87
LOS: 1 days | Discharge: ANOTHER HOSPITAL | End: 2022-03-13
Attending: INTERNAL MEDICINE | Admitting: INTERNAL MEDICINE
Payer: MEDICARE

## 2022-03-13 VITALS
DIASTOLIC BLOOD PRESSURE: 91 MMHG | SYSTOLIC BLOOD PRESSURE: 151 MMHG | OXYGEN SATURATION: 100 % | HEART RATE: 80 BPM | RESPIRATION RATE: 17 BRPM

## 2022-03-13 VITALS
RESPIRATION RATE: 30 BRPM | HEART RATE: 92 BPM | SYSTOLIC BLOOD PRESSURE: 172 MMHG | OXYGEN SATURATION: 100 % | DIASTOLIC BLOOD PRESSURE: 114 MMHG

## 2022-03-13 VITALS
SYSTOLIC BLOOD PRESSURE: 108 MMHG | OXYGEN SATURATION: 100 % | HEART RATE: 68 BPM | RESPIRATION RATE: 18 BRPM | DIASTOLIC BLOOD PRESSURE: 75 MMHG

## 2022-03-13 DIAGNOSIS — I69.30 UNSPECIFIED SEQUELAE OF CEREBRAL INFARCTION: ICD-10-CM

## 2022-03-13 LAB
ALBUMIN SERPL ELPH-MCNC: 3.5 G/DL — SIGNIFICANT CHANGE UP (ref 3.3–5)
ALP SERPL-CCNC: 66 U/L — SIGNIFICANT CHANGE UP (ref 40–120)
ALT FLD-CCNC: 16 U/L — SIGNIFICANT CHANGE UP (ref 10–45)
ANION GAP SERPL CALC-SCNC: 11 MMOL/L — SIGNIFICANT CHANGE UP (ref 5–17)
ANION GAP SERPL CALC-SCNC: 14 MMOL/L — SIGNIFICANT CHANGE UP (ref 5–17)
APPEARANCE UR: CLEAR — SIGNIFICANT CHANGE UP
APTT BLD: 24.3 SEC — LOW (ref 27.5–35.5)
APTT BLD: 28.1 SEC — SIGNIFICANT CHANGE UP (ref 27.5–35.5)
AST SERPL-CCNC: 16 U/L — SIGNIFICANT CHANGE UP (ref 10–40)
BACTERIA # UR AUTO: ABNORMAL /HPF
BASE EXCESS BLDA CALC-SCNC: -1.1 MMOL/L — SIGNIFICANT CHANGE UP (ref -2–3)
BASE EXCESS BLDA CALC-SCNC: 1.2 MMOL/L — SIGNIFICANT CHANGE UP (ref -2–3)
BASOPHILS # BLD AUTO: 0.06 K/UL — SIGNIFICANT CHANGE UP (ref 0–0.2)
BASOPHILS NFR BLD AUTO: 0.8 % — SIGNIFICANT CHANGE UP (ref 0–2)
BILIRUB SERPL-MCNC: 0.6 MG/DL — SIGNIFICANT CHANGE UP (ref 0.2–1.2)
BILIRUB UR-MCNC: NEGATIVE — SIGNIFICANT CHANGE UP
BLOOD GAS COMMENTS ARTERIAL: SIGNIFICANT CHANGE UP
BUN SERPL-MCNC: 13.2 MG/DL — SIGNIFICANT CHANGE UP (ref 8–20)
BUN SERPL-MCNC: 15 MG/DL — SIGNIFICANT CHANGE UP (ref 7–23)
CALCIUM SERPL-MCNC: 8 MG/DL — LOW (ref 8.6–10.2)
CALCIUM SERPL-MCNC: 9.2 MG/DL — SIGNIFICANT CHANGE UP (ref 8.4–10.5)
CHLORIDE SERPL-SCNC: 106 MMOL/L — SIGNIFICANT CHANGE UP (ref 98–107)
CHLORIDE SERPL-SCNC: 108 MMOL/L — SIGNIFICANT CHANGE UP (ref 96–108)
CO2 BLDA-SCNC: 27 MMOL/L — HIGH (ref 19–24)
CO2 SERPL-SCNC: 20 MMOL/L — LOW (ref 22–29)
CO2 SERPL-SCNC: 27 MMOL/L — SIGNIFICANT CHANGE UP (ref 22–31)
COLOR SPEC: YELLOW — SIGNIFICANT CHANGE UP
CREAT SERPL-MCNC: 0.64 MG/DL — SIGNIFICANT CHANGE UP (ref 0.5–1.3)
CREAT SERPL-MCNC: 1.02 MG/DL — SIGNIFICANT CHANGE UP (ref 0.5–1.3)
DIFF PNL FLD: ABNORMAL
EGFR: 53 ML/MIN/1.73M2 — LOW
EGFR: 85 ML/MIN/1.73M2 — SIGNIFICANT CHANGE UP
EOSINOPHIL # BLD AUTO: 0.34 K/UL — SIGNIFICANT CHANGE UP (ref 0–0.5)
EOSINOPHIL NFR BLD AUTO: 4.6 % — SIGNIFICANT CHANGE UP (ref 0–6)
EPI CELLS # UR: SIGNIFICANT CHANGE UP
FLUAV AG NPH QL: SIGNIFICANT CHANGE UP
FLUBV AG NPH QL: SIGNIFICANT CHANGE UP
GAS PNL BLDA: SIGNIFICANT CHANGE UP
GAS PNL BLDA: SIGNIFICANT CHANGE UP
GLUCOSE BLDC GLUCOMTR-MCNC: 132 MG/DL — HIGH (ref 70–99)
GLUCOSE SERPL-MCNC: 145 MG/DL — HIGH (ref 70–99)
GLUCOSE SERPL-MCNC: 194 MG/DL — HIGH (ref 70–99)
GLUCOSE UR QL: NEGATIVE — SIGNIFICANT CHANGE UP
HCO3 BLDA-SCNC: 24 MMOL/L — SIGNIFICANT CHANGE UP (ref 21–28)
HCO3 BLDA-SCNC: 26 MMOL/L — SIGNIFICANT CHANGE UP (ref 21–28)
HCT VFR BLD CALC: 34.7 % — SIGNIFICANT CHANGE UP (ref 34.5–45)
HCT VFR BLD CALC: 40.9 % — SIGNIFICANT CHANGE UP (ref 34.5–45)
HGB BLD-MCNC: 11.8 G/DL — SIGNIFICANT CHANGE UP (ref 11.5–15.5)
HGB BLD-MCNC: 13.5 G/DL — SIGNIFICANT CHANGE UP (ref 11.5–15.5)
HOROWITZ INDEX BLDA+IHG-RTO: 100 — SIGNIFICANT CHANGE UP
HOROWITZ INDEX BLDA+IHG-RTO: 40 — SIGNIFICANT CHANGE UP
IMM GRANULOCYTES NFR BLD AUTO: 0.4 % — SIGNIFICANT CHANGE UP (ref 0–1.5)
INR BLD: 0.98 RATIO — SIGNIFICANT CHANGE UP (ref 0.88–1.16)
INR BLD: 1.09 RATIO — SIGNIFICANT CHANGE UP (ref 0.88–1.16)
KETONES UR-MCNC: NEGATIVE — SIGNIFICANT CHANGE UP
LACTATE SERPL-SCNC: 1.4 MMOL/L — SIGNIFICANT CHANGE UP (ref 0.7–2)
LEUKOCYTE ESTERASE UR-ACNC: NEGATIVE — SIGNIFICANT CHANGE UP
LYMPHOCYTES # BLD AUTO: 3.05 K/UL — SIGNIFICANT CHANGE UP (ref 1–3.3)
LYMPHOCYTES # BLD AUTO: 40.8 % — SIGNIFICANT CHANGE UP (ref 13–44)
MAGNESIUM SERPL-MCNC: 1.8 MG/DL — SIGNIFICANT CHANGE UP (ref 1.6–2.6)
MCHC RBC-ENTMCNC: 30.1 PG — SIGNIFICANT CHANGE UP (ref 27–34)
MCHC RBC-ENTMCNC: 30.3 PG — SIGNIFICANT CHANGE UP (ref 27–34)
MCHC RBC-ENTMCNC: 33 GM/DL — SIGNIFICANT CHANGE UP (ref 32–36)
MCHC RBC-ENTMCNC: 34 GM/DL — SIGNIFICANT CHANGE UP (ref 32–36)
MCV RBC AUTO: 89 FL — SIGNIFICANT CHANGE UP (ref 80–100)
MCV RBC AUTO: 91.3 FL — SIGNIFICANT CHANGE UP (ref 80–100)
MONOCYTES # BLD AUTO: 0.63 K/UL — SIGNIFICANT CHANGE UP (ref 0–0.9)
MONOCYTES NFR BLD AUTO: 8.4 % — SIGNIFICANT CHANGE UP (ref 2–14)
NEUTROPHILS # BLD AUTO: 3.36 K/UL — SIGNIFICANT CHANGE UP (ref 1.8–7.4)
NEUTROPHILS NFR BLD AUTO: 45 % — SIGNIFICANT CHANGE UP (ref 43–77)
NITRITE UR-MCNC: NEGATIVE — SIGNIFICANT CHANGE UP
NRBC # BLD: 0 /100 WBCS — SIGNIFICANT CHANGE UP (ref 0–0)
PCO2 BLDA: 37 MMHG — HIGH (ref 32–35)
PCO2 BLDA: 42 MMHG — HIGH (ref 32–35)
PH BLDA: 7.4 — SIGNIFICANT CHANGE UP (ref 7.35–7.45)
PH BLDA: 7.41 — SIGNIFICANT CHANGE UP (ref 7.35–7.45)
PH UR: 6.5 — SIGNIFICANT CHANGE UP (ref 5–8)
PHOSPHATE SERPL-MCNC: 3.3 MG/DL — SIGNIFICANT CHANGE UP (ref 2.4–4.7)
PLATELET # BLD AUTO: 193 K/UL — SIGNIFICANT CHANGE UP (ref 150–400)
PLATELET # BLD AUTO: 210 K/UL — SIGNIFICANT CHANGE UP (ref 150–400)
PO2 BLDA: 153 MMHG — HIGH (ref 83–108)
PO2 BLDA: 403 MMHG — HIGH (ref 83–108)
POTASSIUM SERPL-MCNC: 3.9 MMOL/L — SIGNIFICANT CHANGE UP (ref 3.5–5.3)
POTASSIUM SERPL-MCNC: 4.5 MMOL/L — SIGNIFICANT CHANGE UP (ref 3.5–5.3)
POTASSIUM SERPL-SCNC: 3.9 MMOL/L — SIGNIFICANT CHANGE UP (ref 3.5–5.3)
POTASSIUM SERPL-SCNC: 4.5 MMOL/L — SIGNIFICANT CHANGE UP (ref 3.5–5.3)
PROT SERPL-MCNC: 7.2 G/DL — SIGNIFICANT CHANGE UP (ref 6–8.3)
PROT UR-MCNC: 30 MG/DL
PROTHROM AB SERPL-ACNC: 11.4 SEC — SIGNIFICANT CHANGE UP (ref 10.5–13.4)
PROTHROM AB SERPL-ACNC: 12.6 SEC — SIGNIFICANT CHANGE UP (ref 10.5–13.4)
RBC # BLD: 3.9 M/UL — SIGNIFICANT CHANGE UP (ref 3.8–5.2)
RBC # BLD: 4.48 M/UL — SIGNIFICANT CHANGE UP (ref 3.8–5.2)
RBC # FLD: 13 % — SIGNIFICANT CHANGE UP (ref 10.3–14.5)
RBC # FLD: 13.2 % — SIGNIFICANT CHANGE UP (ref 10.3–14.5)
RBC CASTS # UR COMP ASSIST: ABNORMAL /HPF (ref 0–4)
RSV RNA NPH QL NAA+NON-PROBE: SIGNIFICANT CHANGE UP
SAO2 % BLDA: 100 % — HIGH (ref 94–98)
SAO2 % BLDA: 99.7 % — HIGH (ref 94–98)
SARS-COV-2 RNA SPEC QL NAA+PROBE: DETECTED
SODIUM SERPL-SCNC: 140 MMOL/L — SIGNIFICANT CHANGE UP (ref 135–145)
SODIUM SERPL-SCNC: 146 MMOL/L — HIGH (ref 135–145)
SP GR SPEC: 1.01 — SIGNIFICANT CHANGE UP (ref 1.01–1.02)
TROPONIN I, HIGH SENSITIVITY RESULT: 11.4 NG/L — SIGNIFICANT CHANGE UP
TROPONIN I, HIGH SENSITIVITY RESULT: 63.9 NG/L — HIGH
UROBILINOGEN FLD QL: NEGATIVE — SIGNIFICANT CHANGE UP
WBC # BLD: 12.71 K/UL — HIGH (ref 3.8–10.5)
WBC # BLD: 7.47 K/UL — SIGNIFICANT CHANGE UP (ref 3.8–10.5)
WBC # FLD AUTO: 12.71 K/UL — HIGH (ref 3.8–10.5)
WBC # FLD AUTO: 7.47 K/UL — SIGNIFICANT CHANGE UP (ref 3.8–10.5)
WBC UR QL: SIGNIFICANT CHANGE UP /HPF (ref 0–5)

## 2022-03-13 PROCEDURE — 70450 CT HEAD/BRAIN W/O DYE: CPT | Mod: 26

## 2022-03-13 PROCEDURE — 71045 X-RAY EXAM CHEST 1 VIEW: CPT | Mod: 26

## 2022-03-13 PROCEDURE — 61645 PERQ ART M-THROMBECT &/NFS: CPT | Mod: LT

## 2022-03-13 PROCEDURE — 31500 INSERT EMERGENCY AIRWAY: CPT

## 2022-03-13 PROCEDURE — 70496 CT ANGIOGRAPHY HEAD: CPT | Mod: 26,MA

## 2022-03-13 PROCEDURE — 99291 CRITICAL CARE FIRST HOUR: CPT

## 2022-03-13 PROCEDURE — 70496 CT ANGIOGRAPHY HEAD: CPT | Mod: 26

## 2022-03-13 PROCEDURE — 70498 CT ANGIOGRAPHY NECK: CPT | Mod: 26,MA

## 2022-03-13 PROCEDURE — 0042T: CPT

## 2022-03-13 PROCEDURE — 93010 ELECTROCARDIOGRAM REPORT: CPT

## 2022-03-13 PROCEDURE — 99291 CRITICAL CARE FIRST HOUR: CPT | Mod: FT,25

## 2022-03-13 RX ORDER — SODIUM CHLORIDE 9 MG/ML
1000 INJECTION INTRAMUSCULAR; INTRAVENOUS; SUBCUTANEOUS
Refills: 0 | Status: DISCONTINUED | OUTPATIENT
Start: 2022-03-13 | End: 2022-03-15

## 2022-03-13 RX ORDER — GLUCAGON INJECTION, SOLUTION 0.5 MG/.1ML
1 INJECTION, SOLUTION SUBCUTANEOUS ONCE
Refills: 0 | Status: DISCONTINUED | OUTPATIENT
Start: 2022-03-13 | End: 2022-03-17

## 2022-03-13 RX ORDER — SODIUM CHLORIDE 9 MG/ML
1000 INJECTION, SOLUTION INTRAVENOUS
Refills: 0 | Status: DISCONTINUED | OUTPATIENT
Start: 2022-03-13 | End: 2022-03-16

## 2022-03-13 RX ORDER — INSULIN LISPRO 100/ML
VIAL (ML) SUBCUTANEOUS EVERY 6 HOURS
Refills: 0 | Status: DISCONTINUED | OUTPATIENT
Start: 2022-03-13 | End: 2022-03-16

## 2022-03-13 RX ORDER — PANTOPRAZOLE SODIUM 20 MG/1
40 TABLET, DELAYED RELEASE ORAL DAILY
Refills: 0 | Status: DISCONTINUED | OUTPATIENT
Start: 2022-03-13 | End: 2022-03-17

## 2022-03-13 RX ORDER — ETOMIDATE 2 MG/ML
20 INJECTION INTRAVENOUS ONCE
Refills: 0 | Status: COMPLETED | OUTPATIENT
Start: 2022-03-13 | End: 2022-03-13

## 2022-03-13 RX ORDER — CHLORHEXIDINE GLUCONATE 213 G/1000ML
15 SOLUTION TOPICAL EVERY 12 HOURS
Refills: 0 | Status: DISCONTINUED | OUTPATIENT
Start: 2022-03-13 | End: 2022-03-17

## 2022-03-13 RX ORDER — DEXTROSE 50 % IN WATER 50 %
15 SYRINGE (ML) INTRAVENOUS ONCE
Refills: 0 | Status: DISCONTINUED | OUTPATIENT
Start: 2022-03-13 | End: 2022-03-16

## 2022-03-13 RX ORDER — PROPOFOL 10 MG/ML
25 INJECTION, EMULSION INTRAVENOUS
Qty: 1000 | Refills: 0 | Status: DISCONTINUED | OUTPATIENT
Start: 2022-03-13 | End: 2022-03-17

## 2022-03-13 RX ORDER — SODIUM CHLORIDE 9 MG/ML
2400 INJECTION INTRAMUSCULAR; INTRAVENOUS; SUBCUTANEOUS ONCE
Refills: 0 | Status: COMPLETED | OUTPATIENT
Start: 2022-03-13 | End: 2022-03-13

## 2022-03-13 RX ORDER — HYDRALAZINE HCL 50 MG
10 TABLET ORAL
Refills: 0 | Status: DISCONTINUED | OUTPATIENT
Start: 2022-03-13 | End: 2022-03-14

## 2022-03-13 RX ORDER — DEXTROSE 50 % IN WATER 50 %
25 SYRINGE (ML) INTRAVENOUS ONCE
Refills: 0 | Status: DISCONTINUED | OUTPATIENT
Start: 2022-03-13 | End: 2022-03-16

## 2022-03-13 RX ORDER — SODIUM CHLORIDE 9 MG/ML
1000 INJECTION INTRAMUSCULAR; INTRAVENOUS; SUBCUTANEOUS
Refills: 0 | Status: DISCONTINUED | OUTPATIENT
Start: 2022-03-13 | End: 2022-03-17

## 2022-03-13 RX ORDER — PROPOFOL 10 MG/ML
10 INJECTION, EMULSION INTRAVENOUS
Qty: 1000 | Refills: 0 | Status: DISCONTINUED | OUTPATIENT
Start: 2022-03-13 | End: 2022-03-17

## 2022-03-13 RX ORDER — NICARDIPINE HYDROCHLORIDE 30 MG/1
5 CAPSULE, EXTENDED RELEASE ORAL
Qty: 40 | Refills: 0 | Status: DISCONTINUED | OUTPATIENT
Start: 2022-03-13 | End: 2022-03-14

## 2022-03-13 RX ORDER — FENTANYL CITRATE 50 UG/ML
1 INJECTION INTRAVENOUS
Qty: 5000 | Refills: 0 | Status: DISCONTINUED | OUTPATIENT
Start: 2022-03-13 | End: 2022-03-13

## 2022-03-13 RX ORDER — SUCCINYLCHOLINE CHLORIDE 100 MG/5ML
100 SYRINGE (ML) INTRAVENOUS ONCE
Refills: 0 | Status: COMPLETED | OUTPATIENT
Start: 2022-03-13 | End: 2022-03-13

## 2022-03-13 RX ORDER — FENTANYL CITRATE 50 UG/ML
1 INJECTION INTRAVENOUS
Qty: 2500 | Refills: 0 | Status: DISCONTINUED | OUTPATIENT
Start: 2022-03-13 | End: 2022-03-13

## 2022-03-13 RX ORDER — LABETALOL HCL 100 MG
10 TABLET ORAL
Refills: 0 | Status: DISCONTINUED | OUTPATIENT
Start: 2022-03-13 | End: 2022-03-14

## 2022-03-13 RX ORDER — DEXTROSE 50 % IN WATER 50 %
12.5 SYRINGE (ML) INTRAVENOUS ONCE
Refills: 0 | Status: DISCONTINUED | OUTPATIENT
Start: 2022-03-13 | End: 2022-03-16

## 2022-03-13 RX ADMIN — FENTANYL CITRATE 8 MICROGRAM(S)/KG/HR: 50 INJECTION INTRAVENOUS at 15:00

## 2022-03-13 RX ADMIN — ETOMIDATE 20 MILLIGRAM(S): 2 INJECTION INTRAVENOUS at 12:22

## 2022-03-13 RX ADMIN — PROPOFOL 11.6 MICROGRAM(S)/KG/MIN: 10 INJECTION, EMULSION INTRAVENOUS at 12:44

## 2022-03-13 RX ADMIN — SODIUM CHLORIDE 2400 MILLILITER(S): 9 INJECTION INTRAMUSCULAR; INTRAVENOUS; SUBCUTANEOUS at 12:27

## 2022-03-13 RX ADMIN — Medication 10 MILLIGRAM(S): at 20:02

## 2022-03-13 RX ADMIN — Medication 100 MILLIGRAM(S): at 12:28

## 2022-03-13 RX ADMIN — PROPOFOL 11.6 MICROGRAM(S)/KG/MIN: 10 INJECTION, EMULSION INTRAVENOUS at 14:33

## 2022-03-13 NOTE — H&P ADULT - HISTORY OF PRESENT ILLNESS
87 yo F with PMH HTN, HLD, chronic LBBB, covid 3 weeks ago who presented to [facility] with unresponsiveness. Per ED physician who obtained history from patient's son, patient's last known well was 3/12 2200, walking and talking normally. Patient then found this am speaking normally around 0930 when had sudden ? syncopal episode. EMS called, found to be unresponsive and possible seizure-like activity in the field. Intubation attempted in field and unsuccessful, traumatic hemoptysis with estimated 250cc blood loss. Taken to [facility] where she was intubated there. Code stroke initiated, found to have L carotid occlusion. Was transferred to [facility] for mechanical thrombectomy. Unable to obtain ROS 2/2 intubation/sedation.    NIH 37, mRS unknown present on admission

## 2022-03-13 NOTE — ED PROVIDER NOTE - CLINICAL SUMMARY MEDICAL DECISION MAKING FREE TEXT BOX
88 y f past hx of recent covid 3 weeks ago , last seen pmd 2 y ago , pt is on beta blockers for unknown reason no other medical hx reported noted  last well known time yerterday 9 pm walking talking , seen on bed talking at 10 am today   onset sudden   locations general   duration hours  characteristics passed out seizure like activity , then unresponsive   context elderly female new onset seizure   aggravating factors none  relieving factors none  timming constant   severity severe  pt was hypotensive in field unresponsive multiple intubations in field failed pt was having oropharyngeal bleed  pt intubated in ed on third attempt with glide scope pt   ct brain suggestive of stroke cta suggestive of significant blockage stroke team was notified tpa not given out of window was transferred for clot retrieval to [facility] (  ett size 7 23 at lip line cuffed for intubation)  pt cta and transfer delayed because of difficult airway mananagement 88 y f past hx of recent covid 3 weeks ago , last seen pmd 2 y ago , pt is on beta blockers for unknown reason no other medical hx reported noted  last well known time yerterday 9 pm walking talking , seen on bed talking at 10 am today   onset sudden   locations general   duration hours  characteristics passed out seizure like activity , then unresponsive   context elderly female new onset seizure   aggravating factors none  relieving factors none  timming constant   severity severe  pt was hypotensive in field unresponsive multiple intubations in field failed pt was having oropharyngeal bleed  pt intubated in ed on third attempt with glide scope pt   ct brain suggestive of stroke cta suggestive of significant blockage stroke team was notified tpa not given out of window was transferred for clot retrieval to [facility] (  ett size 7 23 at lip line cuffed for intubation)  pt cta and transfer delayed because of difficult airway mananagement  Patient was critically ill with a high probability of imminent or life threatening deterioration.  direct patient care (not related to procedure), additional history taking, interpretation of diagnostic studies, documentation, consultation with other physicians, telephone consultation with the patient's family  I have personally and independently provided the amount of critical care time documented below excluding time spent on separate procedures.  40 mins

## 2022-03-13 NOTE — ED PROVIDER NOTE - CRITICAL CARE ATTENDING CONTRIBUTION TO CARE
Shanel MARQUEZ DO, have personally provided 30 minutes of critical care time exclusive of time spent on separately billable procedures. Time includes review of laboratory data, radiology results, discussion with consultants, and monitoring for potential decompensation. Interventions were performed as documented above.

## 2022-03-13 NOTE — H&P ADULT - ATTENDING COMMENTS
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 [place] CT with contrast vs heme transformation.  Acute resp failure due to CVA; traumatic intubation in the field.  PMH HTN, HLD, chronic LBBB, covid 3 weeks ago.  Plan:  admit to NCCU  stability CTh   maintain -130  sedation with Propofol ggt and PRN Fentanyl IVP  stroke core measures  keep intubated in view of traumatic intubation; follow Hgb  rest as above      Pt is critically ill and at high risk of rapid deterioration/death due to abovementioned conditions.   Still requires critical care interventions - ongoing frequent evaluations, interventions and management adjustment by the Attending and ICU team, - and included review of relevant history, clinical examination, review of data and images, discussion of treatment with the multidisciplinary team and any consultants involved in this patient’s care as well as family discussion.

## 2022-03-13 NOTE — ED PROVIDER NOTE - PHYSICAL EXAMINATION
General:     unresponsive minimal movement of extremeties   Head:     NC/AT, EOMI, oropharyngeal bleed   Neck:     trachea midline  Lungs:     CTA b/l, on ventilation with ambu  CVS:     S1S2, RRR, no m/g/r  Abd:     +BS, s/nt/nd, no organomegaly  Ext:    no deformoities noswelling  Neuro: unresponsive minimal movement of extremeties

## 2022-03-13 NOTE — CHART NOTE - NSCHARTNOTEFT_GEN_A_CORE
Neurointerventional Surgery  Pre-Procedure Note     This is a 88y ____ hand dominant Female    HPI:      Allergies: Allergy Status Unknown      PMH/PSH:  PAST MEDICAL & SURGICAL HISTORY:      Social History:   Social History:    FAMILY HISTORY:      Current Medications:   niCARdipine Infusion 5 mG/Hr IV Continuous <Continuous>      Physical Exam:  Constitutional: NAD    Neuro  * Mental Status:  GCS 15:  E(4), V(5), M(6).  Awake, alert, oriented to conversation.  * Cranial Nerves: Cnii-Cnxii grossly intact. PERRL, EOMI, tongue midline, no gaze deviation  * Motor: RUE 5/5, LUE 5/5, RLE 5/5, LLE 5/5  * Sensory: Sensation intact to light touch  * Reflexes: Not assessed  * Gait: Not assessed    Cardiovascular:  S1, S2 no murmurs appreciated.  Regular rate and rhythm.  Eyes: See neurologic examination with detailed examination of eyes.  ENT: No JVD, Trachea Midline.  Respiratory: Clear to auscultation.  Gastrointestinal: Soft, nontender, nondistended.  Genitourinary: [ ] Bernardo, [ x ] No Bernardo.   Musculoskeletal: No muscle wasting noted, (See neuorlogic assessment for full muscle strength assessment) No pretibial edema appreciated, no appreciable calf tenderness.  Skin:  Wound inspected, no redness, bleeding or drainage noted.    Hematologic / Lymph / Immunologic: No bleeding from IV sites or wounds, No lymphadenopathy, No Hives or allergic type skin lesions      NIH SS:  DATE:  TIME:  1A: Level of consciousness (0-3): 0  1B: Questions (0-2): 0    1C: Commands (0-2): 0  2: Gaze (0-2): 0  3: Visual fields (0-3): 0  4: Facial palsy (0-3): 0  MOTOR:  5A: Left arm motor drift (0-4): 0  5B: Right arm motor drift (0-4): 0  6A: Left leg motor drift (0-4): 0  6B: Right leg motor drift (0-4): 0  7: Limb ataxia (0-2): 0  SENSORY:  8: Sensation (0-2): 0  SPEECH:  9: Language (0-3): 0  10: Dysarthria (0-2): 0  EXTINCTION:  11: Extinction/inattention (0-2): 0    TOTAL SCORE:     Labs:                       Blood Bank:         Assessment/Plan:   This is a 88y  year old right / left hand dominant Female  presents with   Patient presents to neuro-IR for selective cerebral angiography.     Procedure, goals, risks, benefits and alternatives  were discussed with patient and (patient's family).  All questions were answered.  Risks include but are not limited to stroke, vessel injury, hemorrhage, and or groin hematoma.  Patient demonstrates understanding  of all risks involved with this procedure and wishes to continue.   Appropriate  consent was obtained from patient and consent is in the patient's chart. Neurointerventional Surgery  Pre-Procedure Note       HPI:  87 yo F with PMH HTN, HLD, chronic LBBB, covid 3 weeks ago who presented to [facility] with unresponsiveness. Per ED physician who obtained history from patient's son, patient's last known well was 3/12 2200, walking and talking normally. Patient then found this am speaking normally around 0930 when had sudden ? syncopal episode. EMS called, found to be unresponsive and possible seizure-like activity in the field. Intubation attempted in field and unsuccessful, traumatic hemoptysis with estimated 250cc blood loss. Taken to [facility] where she was intubated there. Code stroke initiated, found to have L carotid occlusion. Was transferred to [facility] for mechanical thrombectomy. Unable to obtain ROS 2/2 intubation/sedation.      Allergies: Allergy Status Unknown      PAST MEDICAL & SURGICAL HISTORY:  HTN  HLD  Chronic LBBB  Covid x3 weeks ago       Physical Exam: examined while sedated   Constitutional: intubated, sedated    Neuro  * Mental Status: No EO, does not move extremities to noxious stimuli, intubated   * Cranial Nerves: pupils pinpoint, sluggish, no gross facial, no gaze deviation   * Motor: no movement of extremities to noxious stimuli  * Sensory: no movement to noxious stimuli   * Reflexes: Not assessed  * Gait: Not assessed    Cardiovascular:  S1, S2 no murmurs appreciated.  Regular rate and rhythm.  Eyes: See neurologic examination with detailed examination of eyes.  ENT: No JVD, Trachea Midline.  Respiratory: intubated   Gastrointestinal: Soft, nontender, nondistended.  Genitourinary: [ x ] Bernardo, [ ] No Bernardo.   Musculoskeletal: No muscle wasting noted, L tibial IO in place   Skin: no wounds, abrasions noted  Hematologic / Lymph / Immunologic: No bleeding from IV sites or wounds, No lymphadenopathy, No Hives or allergic type skin lesions      [facility] SS:  DATE: 3/13/22  TIME: 1603  1A: Level of consciousness (0-3): 3  1B: Questions (0-2): 2  1C: Commands (0-2): 2  2: Gaze (0-2): 3  3: Visual fields (0-3): 3  4: Facial palsy (0-3): 0  MOTOR:  5A: Left arm motor drift (0-4): 4  5B: Right arm motor drift (0-4): 4  6A: Left leg motor drift (0-4): 4  6B: Right leg motor drift (0-4): 4  7: Limb ataxia (0-2): 0  SENSORY:  8: Sensation (0-2): 2  SPEECH:  9: Language (0-3): 3  10: Dysarthria (0-2): 2  EXTINCTION:  11: Extinction/inattention (0-2): 0    TOTAL SCORE: 37      Labs:   Labs pending      Assessment/Plan:   This is a 88y  year old Female  presents with L carotid T occlusion. Patient presents to neuro-IR for selective cerebral angiography for mechanical thrombectomy.     Procedure, goals, risks, benefits and alternatives  were discussed with patient's son Von [phone].  All questions were answered.  Risks include but are not limited to stroke, vessel injury, hemorrhage, and or groin hematoma.  Patient's son demonstrates understanding  of all risks involved with this procedure and wishes to continue.   Appropriate  consent was obtained from patient's son and consent is in the patient's chart.

## 2022-03-13 NOTE — H&P ADULT - ASSESSMENT
89 yo F with PMH HTN, HLD, chronic LBBB, covid 3 weeks ago who presented to [facility] with unresponsiveness. Per ED physician who obtained history from patient's son, patient's last known well was 3/12 2200, walking and talking normally. Patient then found this am speaking normally around 0930 when had sudden ? syncopal episode. EMS called, found to be unresponsive and possible seizure-like activity in the field. Intubation attempted in field and unsuccessful, traumatic hemoptysis with estimated 250cc blood loss. Taken to [facility] where she was intubated there. Code stroke initiated, found to have L carotid occlusion. Was transferred to [facility] for mechanical thrombectomy. Unable to obtain ROS 2/2 intubation/sedation.      Plan:  Neuro:  - Q1 hour Neuro checks, Q1 hour Vitals  - HOB 30 degrees, Neck midline position  - Maintain normothermia, PO acetaminophen for temp>38 C or pain  - Imaging reviewed   - CTH 9 pm tonight, and in am   - Pain management & Sedation:  - Turn and Position Q2  /  Activity ad asif, with assistance  	  CV:  - SBP goal 100-130  - Nicardipine PRN, hydralazine/labetalol pushes PRN  - Radial a line  - TTE pending  - Lipid panel pending     Pulm:  - Intubated  - Chest PT, OOB, Pulmonary Toilet    GI:  - Nutrition: NPO  - GI prophylaxis: protonix   	  Gu:  - Bernardo  - NS @ 50  - I&O Q1 hour  - Monitor Electrolytes & Renal Function    Heme:  - Monitor H&H  - Mechanical DVT Prophylaxis: Maintain B/L LE sequential compression devices  - No aspirin at this time 2/2 possible ICH vs contrast extravasation, will determine with repeat CTH  	  ID:  - Monitor WBC and Temperature    Endo  - Monitor BGL, maintain <180  -  MISS

## 2022-03-13 NOTE — ED PROVIDER NOTE - PHYSICAL EXAMINATION
Const: Patient intubated, sedated, NAD.  HENT: ETT in place  Resp: symmetrical chest rise, no hypoxia  Ext: no deformities  Neuro: Sedated, no purposeful movements

## 2022-03-13 NOTE — ED ADULT NURSE NOTE - CHIEF COMPLAINT QUOTE
Patient BIBA to ED from [facility] as a neuro transfer.  Patient was unresponsive and found at home, last known well was 0900. Patient intubated.  CT showed LVO.

## 2022-03-13 NOTE — CONSULT NOTE ADULT - SUBJECTIVE AND OBJECTIVE BOX
HISTORY OF PRESENT ILLNESS:  (MRN [id number]) 89 y/o [place] female with PMH of HTN, HLD, chronic LBBB, recent COVID ~3 weeks ago. She presented to [facility] with unresponsiveness. Per ED physician, patient who obtained history from patient's son, she was last seen walking and  talking normal at ~10pm on 3/12. This am, she was laying in bed speaking normally at 9:30am when she suddenly had syncopal episode.   EMS activated. Patient progressed to unresponsiveness and what appeared to be seizure like activity. Intubation attempted in the field though unsuccessful and traumatic resulting in hemoptysis ~250cc.  Subsequently intubated in ED and taken for urgent imaging.       PAST MEDICAL HISTORY:  HTN (hypertension)    Hyperlipidemia    Acute ischemic stroke    RESPIRATORY DISTRESS      PAST SURGICAL HISTORY:    HOME MEDICATIONS:  Home Medications:      FAMILY HISTORY:    SOCIAL HISTORY:    ALLERGIES:  No Known Allergies      VITALS/DATA/ORDERS: [x] Reviewed  Vital Signs Last 24 Hrs  T(C): 36.1 (13 Mar 2022 12:19), Max: 36.1 (13 Mar 2022 12:19)  T(F): 97 (13 Mar 2022 12:19), Max: 97 (13 Mar 2022 12:19)  HR: 80 (13 Mar 2022 14:41) (42 - 94)  BP: 151/91 (13 Mar 2022 14:41) (151/91 - 199/111)  BP(mean): 115 (13 Mar 2022 12:50) (115 - 115)  RR: 17 (13 Mar 2022 14:41) (8 - 32)  SpO2: 100% (13 Mar 2022 14:41) (98% - 100%)                        13.5   7.47  )-----------( 210      ( 13 Mar 2022 12:40 )             40.9     03-13    146<H>  |  108  |  15  ----------------------------<  145<H>  4.5   |  27  |  1.02    Ca    9.2      13 Mar 2022 12:40    TPro  7.2  /  Alb  3.5  /  TBili  0.6  /  DBili  x   /  AST  16  /  ALT  16  /  AlkPhos  66  03-13    PT/INR - ( 13 Mar 2022 12:40 )   PT: 11.4 sec;   INR: 0.98 ratio         PTT - ( 13 Mar 2022 12:40 )  PTT:28.1 sec  CAPILLARY BLOOD GLUCOSE      POCT Blood Glucose.: 132 mg/dL (13 Mar 2022 12:19)    CT Head:     EXAMINATION: Assisted by (OSH staff)  NIHSS:    1A: Level of consciousness       0= Alert; keenly responsive       +1= Arouses to minor stimulation       +2= Requires repeated stimulation to arouse       +2= Movements to pain       +3= Postures or unresponsive  1B: Ask month and age       0= Both questions right       +1= 1 question right       +2= 0 questions right       +1= Dysarthric/intubated/trauma/language barrier       +2= Aphasic  1C: "Blink eyes" and "Squeeze Hands"       0= Performs both       +1= Performs 1 task       +2= Performs 0 tasks    2: Horizontal EOMs       0= Normal       +1= Partial gaze palsy: can be overcome       +1= Partial gaze palsy: corrects w/ oculocephalic reflex        +2= Forzed gaze palsy: cannot be overcome    3: Visual fields       0= No visual loss       +1= Partial hemianopia       +2= Complete hemianopia       +3= Patient is b/l blind       +3= B/l hemianopia    4: Facial palsy (use grimace if obtunded)       0= Normal symmetry       +1= Minor paralysis (flat NLF, smile asymmetry)       +2= Partial paralysis ( lower face)       +3= Unilateral complete paralysis (upper/lower face)       +3= B/l complete paralysis (upper/lower face)    5A: Left arm motor drift (count out loud and use fingers to show count)       0= No drift x 10 seconds       +1= Drift but doesn't hit bed       +2= Drift, hits bed       +2= Some effort against gravity       +3= No effort against gravity       +4= No movement       0= Amputation/joint fusion  5B: Right arm motor drift       0= No drift x 10 seconds       +1= Drift but doesn't hit bed       +2= Drift, hits bed       +2= Some effort against gravity       +3= No effort against gravity       +4= No movement       0= Amputation/joint fusion    6A: Left leg motor drift       0= No drift x 10 seconds       +1= Drift but doesn't hit bed       +2= Drift, hits bed       +2= Some effort against gravity       +3= No effort against gravity       +4= No movement       0= Amputation/joint fusion    6B: Right leg motor drift       0= No drift x 10 seconds       +1= Drift but doesn't hit bed       +2= Drift, hits bed       +2= Some effort against gravity       +3= No effort against gravity       +4= No movement       0= Amputation/joint fusion    7: Limb ataxia (FNF/heel-shin)       0= No ataxia       +1= Ataxia in 1 limb       +2= Ataxia in 2 limbs       0= Does not understand       0= Paralyzed       0= Amputation/joint fusion    8: Sensation       0= Normal, no sensory loss       +1= mild-moderate loss: less sharp/more dull       +1= mild-moderate loss: can sense being touched       +2= Complete loss: cannot sense being touched at all       +2= No response and quadriplegic       +2= Coma/unresponsive    9: Language/aphasia- describe the scene (on shankar); name the items; read the sentences (on shankar)       0= Normal, no aphasia       +1= mild-moderate aphaisa: some obvious changes without significant limitation       +2= Severe aphasia: fragmentary expression, inference needed, cannot identify materials       +3= Mute/global aphasia: no usable speech/auditory comprehension       +3= coma/unresponsive    10: Dysarthria- read the words       0= Normal       +1= mild-moderate dysarthria: slurring but can be understood       +2= Severe dysarthria: unintelligible slurring or out of proportion to dysphasia       +2= Mute/anarthric        0= Intubated/unable to test    11: Extinction/inattention       0= No abnormality       +1= visual/tactile/auditory/spatial/personal inattention       +1= Extinction to b/l simultaneous stimulation       +2= Profound yuliet-inattention (e.g. does not recognize own hand)       +2= extinction to > 1 modality    NIHSS=18 (prior to intubation)-> subsequently intubated and on sedation.    RADIOLOGY IMAGING:     CT:  No loss of gray-white differentiation is identified. No hemorrhage is   present. There is asymmetric hyperattenuation in the M1 segment of the   left middle cerebral artery, may reflect thrombus, and further evaluation   with CTA may be obtained for further evaluation as clinically indicated.      CTA:   CTA brain/CTA carotid artery circulation:   There is complete occlusion   of the proximal left internal carotid artery without reconstitution. The   M1 and proximal M2 segments of the left middle cerebral artery are   occluded. There is recanalization of the sylvian branches of the left   middle cerebral artery presumably from retrograde flow. The proximal A1   segment of the left anterior cerebral artery is occluded, however there   is distal reconstitution of flow secondary to contralateral cross-filling   from an anterior communicating artery.    CT Perfusion: There is 9 mL of core infarct within the left basal ganglia   and left frontotemporal lobes. There is 159 mL of delayed flow within the   left frontal parietal and temporal lobes, compatible with territory at   ischemic risk. There is additional prolonged flow within the right   posterior temporal and occipital lobes, unclear if this is artifactual   versus territory at ischemic risk.      CTPerfusion:   CBF<30% volume: 9 ml  Tmax>6.0 s volume: 168 ml  Mismatch volume: 159 ml  Mismatch ratio: 18.7        IV rTPA CONTRAINDICATIONS  Active bleeding diathesis in the setting of multiple attempts at intubation resulting in trauma with blood loss 250cc      RECOMMENDATIONS:  tPA not administered due to active bleeding.   [x] Emergent transfer to [facility] for mechanical thrombectomy     Plan discussed with Dr Kaylene Piña (neurointerventionalist at [facility]), Dr Jason (Neurointensivist at [facility]), Dr. Mckeon (ED physician)

## 2022-03-13 NOTE — H&P ADULT - NSICDXPASTMEDICALHX_GEN_ALL_CORE_FT
PAST MEDICAL HISTORY:  2019 novel coronavirus disease (COVID-19) February 2022    History of left bundle branch block     HLD (hyperlipidemia)     HTN (hypertension)

## 2022-03-13 NOTE — ED ADULT NURSE NOTE - OBJECTIVE STATEMENT
patient transferred with code stroke & byplane changed to direct admit. patient bypassed ED and went directly to Cath lab accompanied by NONA Duncan.

## 2022-03-13 NOTE — ED PROVIDER NOTE - OBJECTIVE STATEMENT
89 y/o F presents as transfer for Code Stroke for biplane, intubated at [facility] and accepted by Dr. Piña.

## 2022-03-13 NOTE — CHART NOTE - NSCHARTNOTEFT_GEN_A_CORE
Neurointerventional Surgery Post Procedure Note    Procedure: Selective Cerebral Angiography     Pre- Procedure Diagnosis: L carotid T occlusion  Post- Procedure Diagnosis: L carotid T occlusion, TICI 3    : Dr. Wang MD  Physician Assistant: Dyan Shrestha PA-C  Nurse: Anna Marie Field RN, Damaris Núñez RN  Anesthesiologist: Dr. Luz Morel MD                            Radiology Tech: Brain Van RT, Gera Connelly RT    Sheath:  6 Ukrainian Sheath    I/Os: estimated blood loss less than 10cc,  IV fluids 300 cc, Urine output 275 cc, Contrast: Omnipaque 240  50 cc    Vitals:  /57  HR 85  Spo2 100 %    Preliminary Report:  Under a 6 Ukrainian BMX sheath via the right groin under MAC sedation via left internal carotid artery, a selective cerebral angiography  was performed and reveals L carotid T occlusion, TICI 3 reperfusion. ( Official note to follow).    Patient tolerated procedure well.  Patient remains hemodynamically stable, no change in neurological status compared to baseline.  Results were discussed with patient, patient's family and Neurosurgery.  Right groin sheath  was discontinued with Vascade device. Hemostasis was obtained with approximately 5 minutes of manual compression.     No active bleeding, no hematoma, no ecchymosis.   Quick clot and safeguard balloon dressing applied at 1655.  Patient transferred to neuro ICU in stable condition.

## 2022-03-13 NOTE — ED PROVIDER NOTE - OBJECTIVE STATEMENT
88 y f past hx of recent covid 3 weeks ago , last seen pmd 2 y ago , pt is on beta blockers for unknown reason no other medical hx reported noted 88 y f past hx of recent covid 3 weeks ago , last seen pmd 2 y ago , pt is on beta blockers for unknown reason no other medical hx reported noted  last well known time yerterday 9 pm walking talking , seen on bed talking at 10 am today   onset sudden   locations general   duration hours  characteristics passed out seizure like activity , then unresponsive   context elderly female new onset seizure   aggravating factors none  relieving factors none  timming constant   severity severe

## 2022-03-13 NOTE — ED ADULT NURSE NOTE - OBJECTIVE STATEMENT
Pt BIBEMS unresponsive found down at home. As per family they heard and loud douns and found her on the floor. Pt is unresponsive upon arrival to ED and is vomiting. MD Mckeon and RT at bedside, intubation necessary at this time. Pt successfully intubated with 7.0 ET tube, 24 at the lip line. Pt BIBEMS unresponsive found down at home. As per family they heard a loud noise and found her inbed with siezure like activity. Pt is unresponsive upon arrival to ED and is vomiting. MD Mckeon and RT at bedside, intubation necessary at this time. Pt successfully intubated with 7.0 ET tube, 24 at the lip line.

## 2022-03-13 NOTE — ED ADULT TRIAGE NOTE - CHIEF COMPLAINT QUOTE
As per EMS, pt found unresponsive at home. Pt arrives ventilated by BVM with left tibia IO in place -isar positive

## 2022-03-13 NOTE — H&P ADULT - NSHPPHYSICALEXAM_GEN_ALL_CORE
Physical Exam: examined while sedated/intubated   Constitutional: intubated, sedated    Neuro  * Mental Status: No EO, does not move extremities to noxious stimuli, intubated   * Cranial Nerves: pupils pinpoint, sluggish, no gross facial, no gaze deviation   * Motor: no movement of extremities to noxious stimuli  * Sensory: no movement to noxious stimuli   * Reflexes: Not assessed  * Gait: Not assessed    Cardiovascular:  S1, S2 no murmurs appreciated.  Regular rate and rhythm.  Eyes: See neurologic examination with detailed examination of eyes.  ENT: No JVD, Trachea Midline.  Respiratory: intubated   Gastrointestinal: Soft, nontender, nondistended.  Genitourinary: [ x ] Bernardo, [ ] No Bernardo.   Musculoskeletal: No muscle wasting noted, L tibial IO in place   Skin: no wounds, abrasions noted  Hematologic / Lymph / Immunologic: No bleeding from IV sites or wounds, No lymphadenopathy, No Hives or allergic type skin lesions

## 2022-03-13 NOTE — ED ADULT TRIAGE NOTE - CHIEF COMPLAINT QUOTE
Patient BIBA to ED from [facility] as a neuro transfer.  Patient was unresponsive and found at home, last known well was 0900. Patient intubated.  CT showed LVO.

## 2022-03-13 NOTE — ED ADULT NURSE NOTE - NSIMPLEMENTINTERV_GEN_ALL_ED
Implemented All Fall with Harm Risk Interventions:  Larose to call system. Call bell, personal items and telephone within reach. Instruct patient to call for assistance. Room bathroom lighting operational. Non-slip footwear when patient is off stretcher. Physically safe environment: no spills, clutter or unnecessary equipment. Stretcher in lowest position, wheels locked, appropriate side rails in place. Provide visual cue, wrist band, yellow gown, etc. Monitor gait and stability. Monitor for mental status changes and reorient to person, place, and time. Review medications for side effects contributing to fall risk. Reinforce activity limits and safety measures with patient and family. Provide visual clues: red socks.

## 2022-03-14 DIAGNOSIS — I48.91 UNSPECIFIED ATRIAL FIBRILLATION: ICD-10-CM

## 2022-03-14 DIAGNOSIS — I63.9 CEREBRAL INFARCTION, UNSPECIFIED: ICD-10-CM

## 2022-03-14 LAB
A1C WITH ESTIMATED AVERAGE GLUCOSE RESULT: 6.4 % — HIGH (ref 4–5.6)
ANION GAP SERPL CALC-SCNC: 13 MMOL/L — SIGNIFICANT CHANGE UP (ref 5–17)
APTT BLD: 29.6 SEC — SIGNIFICANT CHANGE UP (ref 27.5–35.5)
BLD GP AB SCN SERPL QL: SIGNIFICANT CHANGE UP
BUN SERPL-MCNC: 12.7 MG/DL — SIGNIFICANT CHANGE UP (ref 8–20)
CALCIUM SERPL-MCNC: 8.6 MG/DL — SIGNIFICANT CHANGE UP (ref 8.6–10.2)
CHLORIDE SERPL-SCNC: 108 MMOL/L — HIGH (ref 98–107)
CHOLEST SERPL-MCNC: 196 MG/DL — SIGNIFICANT CHANGE UP
CK SERPL-CCNC: 26 U/L — SIGNIFICANT CHANGE UP (ref 25–170)
CO2 SERPL-SCNC: 22 MMOL/L — SIGNIFICANT CHANGE UP (ref 22–29)
CREAT SERPL-MCNC: 0.53 MG/DL — SIGNIFICANT CHANGE UP (ref 0.5–1.3)
CRP SERPL-MCNC: 61 MG/L — HIGH
D DIMER BLD IA.RAPID-MCNC: 9766 NG/ML DDU — HIGH
EGFR: 89 ML/MIN/1.73M2 — SIGNIFICANT CHANGE UP
ERYTHROCYTE [SEDIMENTATION RATE] IN BLOOD: 14 MM/HR — SIGNIFICANT CHANGE UP (ref 0–20)
ESTIMATED AVERAGE GLUCOSE: 137 MG/DL — HIGH (ref 68–114)
FIBRINOGEN PPP-MCNC: 479 MG/DL — SIGNIFICANT CHANGE UP (ref 330–520)
GLUCOSE BLDC GLUCOMTR-MCNC: 118 MG/DL — HIGH (ref 70–99)
GLUCOSE BLDC GLUCOMTR-MCNC: 125 MG/DL — HIGH (ref 70–99)
GLUCOSE BLDC GLUCOMTR-MCNC: 138 MG/DL — HIGH (ref 70–99)
GLUCOSE BLDC GLUCOMTR-MCNC: 152 MG/DL — HIGH (ref 70–99)
GLUCOSE SERPL-MCNC: 148 MG/DL — HIGH (ref 70–99)
HCT VFR BLD CALC: 34.5 % — SIGNIFICANT CHANGE UP (ref 34.5–45)
HDLC SERPL-MCNC: 46 MG/DL — LOW
HGB BLD-MCNC: 11.4 G/DL — LOW (ref 11.5–15.5)
INR BLD: 1.11 RATIO — SIGNIFICANT CHANGE UP (ref 0.88–1.16)
LIPID PNL WITH DIRECT LDL SERPL: 130 MG/DL — HIGH
MAGNESIUM SERPL-MCNC: 1.9 MG/DL — SIGNIFICANT CHANGE UP (ref 1.6–2.6)
MCHC RBC-ENTMCNC: 29.5 PG — SIGNIFICANT CHANGE UP (ref 27–34)
MCHC RBC-ENTMCNC: 33 GM/DL — SIGNIFICANT CHANGE UP (ref 32–36)
MCV RBC AUTO: 89.1 FL — SIGNIFICANT CHANGE UP (ref 80–100)
MRSA PCR RESULT.: SIGNIFICANT CHANGE UP
NON HDL CHOLESTEROL: 150 MG/DL — HIGH
PHOSPHATE SERPL-MCNC: 3.6 MG/DL — SIGNIFICANT CHANGE UP (ref 2.4–4.7)
PLATELET # BLD AUTO: 175 K/UL — SIGNIFICANT CHANGE UP (ref 150–400)
POTASSIUM SERPL-MCNC: 4 MMOL/L — SIGNIFICANT CHANGE UP (ref 3.5–5.3)
POTASSIUM SERPL-SCNC: 4 MMOL/L — SIGNIFICANT CHANGE UP (ref 3.5–5.3)
PROTHROM AB SERPL-ACNC: 12.9 SEC — SIGNIFICANT CHANGE UP (ref 10.5–13.4)
RBC # BLD: 3.87 M/UL — SIGNIFICANT CHANGE UP (ref 3.8–5.2)
RBC # FLD: 13.2 % — SIGNIFICANT CHANGE UP (ref 10.3–14.5)
S AUREUS DNA NOSE QL NAA+PROBE: SIGNIFICANT CHANGE UP
SODIUM SERPL-SCNC: 143 MMOL/L — SIGNIFICANT CHANGE UP (ref 135–145)
TRIGL SERPL-MCNC: 102 MG/DL — SIGNIFICANT CHANGE UP
TROPONIN T SERPL-MCNC: <0.01 NG/ML — SIGNIFICANT CHANGE UP (ref 0–0.06)
TSH SERPL-MCNC: 0.64 UIU/ML — SIGNIFICANT CHANGE UP (ref 0.27–4.2)
WBC # BLD: 9.9 K/UL — SIGNIFICANT CHANGE UP (ref 3.8–10.5)
WBC # FLD AUTO: 9.9 K/UL — SIGNIFICANT CHANGE UP (ref 3.8–10.5)

## 2022-03-14 PROCEDURE — 70547 MR ANGIOGRAPHY NECK W/O DYE: CPT | Mod: 26

## 2022-03-14 PROCEDURE — 99222 1ST HOSP IP/OBS MODERATE 55: CPT

## 2022-03-14 PROCEDURE — 99233 SBSQ HOSP IP/OBS HIGH 50: CPT

## 2022-03-14 PROCEDURE — 70544 MR ANGIOGRAPHY HEAD W/O DYE: CPT | Mod: 26,59

## 2022-03-14 PROCEDURE — 93320 DOPPLER ECHO COMPLETE: CPT | Mod: 26

## 2022-03-14 PROCEDURE — 99291 CRITICAL CARE FIRST HOUR: CPT

## 2022-03-14 PROCEDURE — 93970 EXTREMITY STUDY: CPT | Mod: 26

## 2022-03-14 PROCEDURE — 70450 CT HEAD/BRAIN W/O DYE: CPT | Mod: 26

## 2022-03-14 PROCEDURE — 93325 DOPPLER ECHO COLOR FLOW MAPG: CPT | Mod: 26

## 2022-03-14 PROCEDURE — 99223 1ST HOSP IP/OBS HIGH 75: CPT

## 2022-03-14 PROCEDURE — 76376 3D RENDER W/INTRP POSTPROCES: CPT | Mod: 26

## 2022-03-14 PROCEDURE — 70551 MRI BRAIN STEM W/O DYE: CPT | Mod: 26

## 2022-03-14 PROCEDURE — 93010 ELECTROCARDIOGRAM REPORT: CPT

## 2022-03-14 PROCEDURE — 93312 ECHO TRANSESOPHAGEAL: CPT | Mod: 26

## 2022-03-14 RX ORDER — ATORVASTATIN CALCIUM 80 MG/1
20 TABLET, FILM COATED ORAL AT BEDTIME
Refills: 0 | Status: DISCONTINUED | OUTPATIENT
Start: 2022-03-14 | End: 2022-03-16

## 2022-03-14 RX ORDER — FENTANYL CITRATE 50 UG/ML
50 INJECTION INTRAVENOUS ONCE
Refills: 0 | Status: DISCONTINUED | OUTPATIENT
Start: 2022-03-14 | End: 2022-03-14

## 2022-03-14 RX ORDER — SODIUM CHLORIDE 9 MG/ML
500 INJECTION INTRAMUSCULAR; INTRAVENOUS; SUBCUTANEOUS ONCE
Refills: 0 | Status: COMPLETED | OUTPATIENT
Start: 2022-03-14 | End: 2022-03-14

## 2022-03-14 RX ORDER — ASPIRIN/CALCIUM CARB/MAGNESIUM 324 MG
81 TABLET ORAL DAILY
Refills: 0 | Status: DISCONTINUED | OUTPATIENT
Start: 2022-03-14 | End: 2022-03-14

## 2022-03-14 RX ORDER — ACETAMINOPHEN 500 MG
1000 TABLET ORAL ONCE
Refills: 0 | Status: COMPLETED | OUTPATIENT
Start: 2022-03-14 | End: 2022-03-14

## 2022-03-14 RX ORDER — CHLORHEXIDINE GLUCONATE 213 G/1000ML
1 SOLUTION TOPICAL DAILY
Refills: 0 | Status: DISCONTINUED | OUTPATIENT
Start: 2022-03-14 | End: 2022-03-17

## 2022-03-14 RX ORDER — ENOXAPARIN SODIUM 100 MG/ML
40 INJECTION SUBCUTANEOUS EVERY 24 HOURS
Refills: 0 | Status: DISCONTINUED | OUTPATIENT
Start: 2022-03-14 | End: 2022-03-16

## 2022-03-14 RX ORDER — METOPROLOL TARTRATE 50 MG
12.5 TABLET ORAL EVERY 12 HOURS
Refills: 0 | Status: DISCONTINUED | OUTPATIENT
Start: 2022-03-14 | End: 2022-03-14

## 2022-03-14 RX ORDER — METOPROLOL TARTRATE 50 MG
5 TABLET ORAL EVERY 6 HOURS
Refills: 0 | Status: DISCONTINUED | OUTPATIENT
Start: 2022-03-14 | End: 2022-03-14

## 2022-03-14 RX ORDER — ASPIRIN/CALCIUM CARB/MAGNESIUM 324 MG
81 TABLET ORAL DAILY
Refills: 0 | Status: DISCONTINUED | OUTPATIENT
Start: 2022-03-14 | End: 2022-03-17

## 2022-03-14 RX ORDER — LABETALOL HCL 100 MG
10 TABLET ORAL
Refills: 0 | Status: DISCONTINUED | OUTPATIENT
Start: 2022-03-14 | End: 2022-03-14

## 2022-03-14 RX ORDER — METOPROLOL TARTRATE 50 MG
5 TABLET ORAL ONCE
Refills: 0 | Status: COMPLETED | OUTPATIENT
Start: 2022-03-14 | End: 2022-03-14

## 2022-03-14 RX ADMIN — Medication 1000 MILLIGRAM(S): at 14:31

## 2022-03-14 RX ADMIN — Medication 10 MILLIGRAM(S): at 06:37

## 2022-03-14 RX ADMIN — Medication 10 MILLIGRAM(S): at 09:43

## 2022-03-14 RX ADMIN — Medication 5 MILLIGRAM(S): at 10:28

## 2022-03-14 RX ADMIN — PANTOPRAZOLE SODIUM 40 MILLIGRAM(S): 20 TABLET, DELAYED RELEASE ORAL at 13:13

## 2022-03-14 RX ADMIN — CHLORHEXIDINE GLUCONATE 15 MILLILITER(S): 213 SOLUTION TOPICAL at 18:03

## 2022-03-14 RX ADMIN — SODIUM CHLORIDE 500 MILLILITER(S): 9 INJECTION INTRAMUSCULAR; INTRAVENOUS; SUBCUTANEOUS at 17:27

## 2022-03-14 RX ADMIN — SODIUM CHLORIDE 50 MILLILITER(S): 9 INJECTION INTRAMUSCULAR; INTRAVENOUS; SUBCUTANEOUS at 13:49

## 2022-03-14 RX ADMIN — Medication 2: at 13:07

## 2022-03-14 RX ADMIN — FENTANYL CITRATE 50 MICROGRAM(S): 50 INJECTION INTRAVENOUS at 15:40

## 2022-03-14 RX ADMIN — ENOXAPARIN SODIUM 40 MILLIGRAM(S): 100 INJECTION SUBCUTANEOUS at 21:20

## 2022-03-14 RX ADMIN — Medication 400 MILLIGRAM(S): at 13:49

## 2022-03-14 RX ADMIN — Medication 81 MILLIGRAM(S): at 13:06

## 2022-03-14 RX ADMIN — FENTANYL CITRATE 50 MICROGRAM(S): 50 INJECTION INTRAVENOUS at 16:10

## 2022-03-14 RX ADMIN — SODIUM CHLORIDE 500 MILLILITER(S): 9 INJECTION INTRAMUSCULAR; INTRAVENOUS; SUBCUTANEOUS at 12:45

## 2022-03-14 RX ADMIN — ATORVASTATIN CALCIUM 20 MILLIGRAM(S): 80 TABLET, FILM COATED ORAL at 21:19

## 2022-03-14 RX ADMIN — CHLORHEXIDINE GLUCONATE 15 MILLILITER(S): 213 SOLUTION TOPICAL at 06:43

## 2022-03-14 RX ADMIN — CHLORHEXIDINE GLUCONATE 1 APPLICATION(S): 213 SOLUTION TOPICAL at 13:15

## 2022-03-14 NOTE — CONSULT NOTE ADULT - SUBJECTIVE AND OBJECTIVE BOX
[place] CARDIAC ELECTROPHYSIOLOGY  [facility]   Office: [address] [address]  Telephone: [phone] Fax:[phone]      87 yo F with PMH HTN, HLD, chronic LBBB, COVID 3 weeks ago who presented to [facility] with unresponsiveness. Per ED physician who obtained history from patient's son, patient's last known well was 3/12 2200, walking and talking normally. Patient then found this am speaking normally around 0930 when had sudden ? syncopal episode. EMS called, found to be unresponsive and possible seizure-like activity in the field. Intubation attempted in field and unsuccessful, traumatic hemoptysis with estimated 250cc blood loss. Taken to [facility] where she was intubated there. Code stroke initiated, found to have L carotid occlusion. Was transferred to [facility] for mechanical thrombectomy. Unable to obtain ROS 2/2 intubation/sedation.    NIH 37, mRS unknown present on admission  (13 Mar 2022 16:02)      PAST MEDICAL & SURGICAL HISTORY:  HTN (hypertension)    HLD (hyperlipidemia)    History of left bundle branch block    2019 novel coronavirus disease (COVID-19)  February 2022        REVIEW OF SYSTEMS:    CONSTITUTIONAL: No fever, weight loss, or fatigue  EYES: No visual disturbances  NECK: No pain or stiffness  RESPIRATORY: No cough, wheezing, chills or hemoptysis; No shortness of breath  CARDIOVASCULAR: see HPI  GASTROINTESTINAL: No abdominal or epigastric pain. No nausea, vomiting, or hematemesis; No diarrhea or constipation. No melena or hematochezia.  NEUROLOGICAL: No headaches, memory loss, loss of strength, numbness, or tremors  SKIN: No itching, burning, rashes, or lesions   LYMPH NODES: No enlarged glands  ENDOCRINE: No heat or cold intolerance; No hair loss  PSYCHIATRIC: No depression, anxiety, mood swings, or difficulty sleeping  HEME/LYMPH: No easy bruising, or bleeding gums      MEDICATIONS  (STANDING):  aspirin  chewable 81 milliGRAM(s) Oral daily  atorvastatin 20 milliGRAM(s) Oral at bedtime  chlorhexidine 0.12% Liquid 15 milliLiter(s) Oral Mucosa every 12 hours  chlorhexidine 2% Cloths 1 Application(s) Topical daily  dextrose 40% Gel 15 Gram(s) Oral once  dextrose 5%. 1000 milliLiter(s) (50 mL/Hr) IV Continuous <Continuous>  dextrose 5%. 1000 milliLiter(s) (100 mL/Hr) IV Continuous <Continuous>  dextrose 50% Injectable 25 Gram(s) IV Push once  dextrose 50% Injectable 12.5 Gram(s) IV Push once  dextrose 50% Injectable 25 Gram(s) IV Push once  enoxaparin Injectable 40 milliGRAM(s) SubCutaneous every 24 hours  fentaNYL    Injectable 50 MICROGram(s) IV Push once  glucagon  Injectable 1 milliGRAM(s) IntraMuscular once  insulin lispro (ADMELOG) corrective regimen sliding scale   SubCutaneous every 6 hours  pantoprazole  Injectable 40 milliGRAM(s) IV Push daily  propofol Infusion 10 MICROgram(s)/kG/Min (4.8 mL/Hr) IV Continuous <Continuous>  sodium chloride 0.9% Bolus 500 milliLiter(s) IV Bolus once  sodium chloride 0.9%. 1000 milliLiter(s) (50 mL/Hr) IV Continuous <Continuous>    MEDICATIONS  (PRN):      Allergies    Allergy Status Unknown    Intolerances    hydrALAZINE (Other)      SOCIAL HISTORY:    FAMILY HISTORY:      Vital Signs Last 24 Hrs  T(C): 38.3 (14 Mar 2022 13:55), Max: 38.4 (14 Mar 2022 12:55)  T(F): 100.9 (14 Mar 2022 13:55), Max: 101.1 (14 Mar 2022 12:55)  HR: 107 (14 Mar 2022 13:55) (68 - 137)  BP: 128/54 (14 Mar 2022 13:55) (83/50 - 143/60)  BP(mean): 76 (14 Mar 2022 13:55) (57 - 84)  RR: 14 (14 Mar 2022 13:55) (11 - 21)  SpO2: 100% (14 Mar 2022 13:55) (99% - 100%)    Physical Exam:  Constitutional: AAOx3, NAD  Neck: supple, No JVD  Cardiovascular: +S1S2 RRR, no murmurs, rubs, gallops   Pulmonary: CTA b/l, unlabored, no wheezes, rales. rhonci  Abdomen: +BS, soft NTND  Extremities: no edema b/l, +distal pulses b/l  Neuro: non focal, speech clear, RINALDI x 4    LABS:                        11.4   9.90  )-----------( 175      ( 14 Mar 2022 04:06 )             34.5   03-14    143  |  108<H>  |  12.7  ----------------------------<  148<H>  4.0   |  22.0  |  0.53    Ca    8.6      14 Mar 2022 04:06  Phos  3.6     03-14  Mg     1.9     03-14      PT/INR - ( 13 Mar 2022 20:51 )   PT: 12.6 sec;   INR: 1.09 ratio         PTT - ( 13 Mar 2022 20:51 )  PTT:24.3 secCARDIAC MARKERS ( 14 Mar 2022 11:01 )  x     / <0.01 ng/mL / 26 U/L / x     / x              RADIOLOGY & ADDITIONAL STUDIES:   [place] CARDIAC ELECTROPHYSIOLOGY  [facility]   Office: [address] [facility], [place] [address]  Telephone: [phone] Fax:[phone]      87 yo F with PMH HTN, HLD, chronic LBBB, COVID 3 weeks ago who presented to [facility] with syncope described as sudden loss of consciousness with possible  seizure-like activity at 0930 3/13/22.  Intubation attempted in field and unsuccessful, with traumatic hemoptysis (EBL 250cc). She was taken to [facility] where she was intubated and Code Stroke initiated. CT demonstrated left carotid occlusion and patient was transferred to [facility] for mechanical thrombectomy. Post operatively, pt has thus far remained unresponsive and repeat CT demonstrates likely acute ischemia of the left basal ganglia, and acute infarct of the right PCA and inferior left cerebellum. Pt is now noted to have paroxysmal AF with RVR to 130s bpm, as well as periods of bradycardia in sinus rhythm. EP is consulted for AF with RVR and high grade AV block.  Unable to obtain ROS 2/2 intubation/sedation.     PAST MEDICAL & SURGICAL HISTORY:  HTN (hypertension)  HLD (hyperlipidemia)  History of left bundle branch block  2019 novel coronavirus disease (COVID-19)  February 2022    REVIEW OF SYSTEMS:  Unable to obtain ROS  - pt intubated and sedated.       MEDICATIONS  (STANDING):  aspirin  chewable 81 milliGRAM(s) Oral daily  atorvastatin 20 milliGRAM(s) Oral at bedtime  chlorhexidine 0.12% Liquid 15 milliLiter(s) Oral Mucosa every 12 hours  chlorhexidine 2% Cloths 1 Application(s) Topical daily  dextrose 40% Gel 15 Gram(s) Oral once  dextrose 5%. 1000 milliLiter(s) (50 mL/Hr) IV Continuous <Continuous>  dextrose 5%. 1000 milliLiter(s) (100 mL/Hr) IV Continuous <Continuous>  dextrose 50% Injectable 25 Gram(s) IV Push once  dextrose 50% Injectable 12.5 Gram(s) IV Push once  dextrose 50% Injectable 25 Gram(s) IV Push once  enoxaparin Injectable 40 milliGRAM(s) SubCutaneous every 24 hours  fentaNYL    Injectable 50 MICROGram(s) IV Push once  glucagon  Injectable 1 milliGRAM(s) IntraMuscular once  insulin lispro (ADMELOG) corrective regimen sliding scale   SubCutaneous every 6 hours  pantoprazole  Injectable 40 milliGRAM(s) IV Push daily  propofol Infusion 10 MICROgram(s)/kG/Min (4.8 mL/Hr) IV Continuous <Continuous>  sodium chloride 0.9% Bolus 500 milliLiter(s) IV Bolus once  sodium chloride 0.9%. 1000 milliLiter(s) (50 mL/Hr) IV Continuous <Continuous>      Allergies  Allergy Status Unknown    Intolerances  hydrALAZINE (Other)      SOCIAL HISTORY: unable to obtain - pt intubated & sedated. Per pt's son at bedside, pt was active w/ full ADLs    FAMILY HISTORY: unknown      Vital Signs Last 24 Hrs  T(C): 38.3 (14 Mar 2022 13:55), Max: 38.4 (14 Mar 2022 12:55)  T(F): 100.9 (14 Mar 2022 13:55), Max: 101.1 (14 Mar 2022 12:55)  HR: 107 (14 Mar 2022 13:55) (68 - 137)  BP: 128/54 (14 Mar 2022 13:55) (83/50 - 143/60)  BP(mean): 76 (14 Mar 2022 13:55) (57 - 84)  RR: 14 (14 Mar 2022 13:55) (11 - 21)  SpO2: 100% (14 Mar 2022 13:55) (99% - 100%)    Physical Exam:  Constitutional: intubated, sedated, no response to noxious stimuli  Neck: supple, No JVD  Cardiovascular: +S1S2 tachycardic, irregular  Pulmonary: CTA b/l  Abdomen: +BS, soft NTND  Extremities: no edema b/l, +distal pulses b/l  Neuro: intubated, sedated, no response to noxious stimuli    LABS:                      11.4   9.90  )-----------( 175      ( 14 Mar 2022 04:06 )             34.5     143  |  108<H>  |  12.7  ----------------------------<  148<H>  4.0   |  22.0  |  0.53    Ca    8.6      14 Mar 2022 04:06  Phos  3.6     03-14  Mg     1.9     03-14    PT/INR - ( 13 Mar 2022 20:51 )   PT: 12.6 sec;   INR: 1.09 ratio       PTT - ( 13 Mar 2022 20:51 )  PTT:24.3 secCARDIAC MARKERS ( 14 Mar 2022 11:01 )  x     / <0.01 ng/mL / 26 U/L / x     / x        RADIOLOGY & ADDITIONAL STUDIES:  echo pending    < from: CT Head No Cont (03.13.22 @ 22:03) >  INTERPRETATION:  Clinical indication: Status post thrombectomy.    Multiple axial sections were performed from base skull to vertex without   contrast enhancement. Coronal and sagittal reconstructions were performed.    This exam is compared with prior head CT performed on March 13, 2022.    Abnormal low-attenuation involving the left temporal cortical and   subcortical region is seen which is compatiblewith an acute infarct.   Loss of the gray-white differentiation is seen is well. This is   compatible with an acute right PCA infarct.    Abnormal areas of low-attenuation involving the posterior medial right   temporal region. This is compatible with acute left MCA infarct.    There is evidence of vague area of high attenuation seen on left corona   radiata region. High attenuation also again seen involving the left basal   ganglia region. Associated with this is areas of hemorrhage or retained   contrast. MRI can be done to better characterize finding.    Evaluation of the osseous structures with the appropriate window   demonstrates hyperostosis frontalis interna    Left sphenoid sinus mucosal thickening is seen.    Both mastoid and middleear regions appear clear.    IMPRESSION: Areas of acute infarct as described.    Vague areas of high attenuation involving the left basal ganglia and   corona radiata region.    --- End of Report ---    ZARA SAUCEDO MD; Attending Radiologist  This document has been electronically signed. Mar 14 2022  8:28AM    < end of copied text >      < from: CT Head No Cont (03.14.22 @ 05:25) >  IMPRESSION:  *  REGIONS OF INCREASED DENSITY WITHIN THE LEFT BASAL GANGLIA AGAIN SEEN.   FAVOR CONTRAST ENHANCEMENT. LIKELY ASSOCIATED ACUTE ISCHEMIA.  *  ACUTE RIGHT PCA INFARCT BETTER SEEN.  *  INFERIOR LEFT CEREBELLAR ISCHEMIA.  *  NO MIDLINE SHIFT OR HYDROCEPHALUS    --- End of Report ---  JENNIFER OLSON MD; Attending Radiologist    < end of copied text >

## 2022-03-14 NOTE — PHARMACOTHERAPY INTERVENTION NOTE - COMMENTS
Contacted patient pharmacy to get home medications. Contacted patient pharmacy to get home medications.    star jose list

## 2022-03-14 NOTE — DISCHARGE NOTE NURSING/CASE MANAGEMENT/SOCIAL WORK - NSDCPEFALRISK_GEN_ALL_CORE
For information on Fall & Injury Prevention, visit: [email or web address] OR  [email or web address] OR  https://www.cdc.gov/steadi/patient.html

## 2022-03-14 NOTE — CONSULT NOTE ADULT - ATTENDING COMMENTS
Patient with recent stroke (evidence of bilateral acute ischemia) with significant neurological deficits s/p left ICA thrombectomy. Remains intubated and unresponsive. Now with two episodes of high grade AV block in the setting of pre-existing left bundle branch block. Depending on patient's neurological recovery and if within her goals of care the patient may need a permanent pacemaker. Avoid AV jd blockers in the meantime. If more pauses are seen may need a temporary pacing wire until status of neurological recovery/goals of care are determined.
pause: with transient complete heart block. likely vagal mediated.  2 episodes at 6 58 am and at 10:05. relaeted to nausea and gagging.   nausea nd gagging related to hydralzine injection.   nevertheless, r./o raised ICT.    Ep evaluation.    atrial fibrillation : newly diagnosed.   on monitor diagnosed 10:05  am.  aftrer the pause the rhythm ocnverted to afib.   atrial fibrillation with rapid ventricular rate . received beta-blocker  toleratged beta-blocker well.     cerebrovascular accident : left carotid Occlusion. however. stroke are bilateral.  carotid Occlusion du eto embolic rather rae carotid athersclerosis  Empbnolic cerebrovascular accident : differential: afib, or recent covid induced thrombotic disorder.  CRp and ESR and d dimer.   recent stroke. not on anticoagulation . MRi of head pending.   discussed with Neuro intensivist.  if mri of head microhemorrhage , then high risk of bleeding from the anticoagulation ,.  if boyd is negative for any reisural thrombus in the arch of aorta or MATTIE, then will initiate anticoagulation as late as possible to avoid intracebral bleeding. apprx 10 days.  However, if residual thrombus, then may inistiate anticoagulation sooner.   Will schedule for BOYD before the patient is extubated to avoid anesthesia complication s on the recent stroke patient.  discussed with daughter at bedside.  (HCP is son though)  discusse with neurol ICU tream  aspirin statins.

## 2022-03-14 NOTE — CONSULT NOTE ADULT - SUBJECTIVE AND OBJECTIVE BOX
[facility]                                              CARDIOLOGY AT [facility]                                                   [address], [place] [address]                                             Telephone: [phone]. Fax:[phone]                                                       CARDIOLOGY CONSULTATION NOTE                                                                                             History obtained by: Family &  medical record  Community Cardiologist: none   obtained: Yes [  ] No [x ]  Reason for Consultation: New onset Afib  Available out pt records reviewed: none available    Chief complaint:    Patient is a 88y old  Female who presents with a chief complaint of stroke (14 Mar 2022 03:41)      HPI:  89 y/o F with PMH HTN, HLD, chronic LBBB, recent covid ifx  (3 weeks ago) who presented to [facility] with unresponsiveness. Patient's last known well was 3/12 2200, walking and talking normally. Per H&P was found speaking normally around 0930am on 3/13 when she had sudden possibly syncopal episode.  Unsuccessful intubated in the field, intubated at [facility]. Code stroke initiated, found to have L carotid occlusion and transferred to [facility] for mechanical thrombectomy.  She is now POD1 s/p mechanical thrombectomy of LEFT ICA occlusion and remains intubated in the NSICU.  Cardiology consult called for new Afib starting this morning. At 7AM patient was administered hydralazine IVP for HTN after which patient reportedly developed nausea/gagging (vasovagal ?) and 6 second pause was noted on Tele. Hydralazine was again administered at around 10 am with similar effect, with subsequent pause of 3.5 seconds. At that time rhythm was ST but after converted to PROMISE to 130s. She was given 5mg IVP metoprolol for HR and is now  rate controlled in AFIB 90s. During these events patient was on CPAP and sedation was on hold. Of note patient is also with new right sided CVA.  She is pending MRI today. Patient is intubated and cannot participate in ROS.       CARDIAC TESTING     No prior cardiac testing available.     PAST MEDICAL HISTORY  HTN (hypertension)  HLD (hyperlipidemia)  History of left bundle branch block  2019 novel coronavirus disease (COVID-19)      PAST SURGICAL HISTORY    SOCIAL HISTORY:  Denies smoking/alcohol/drugs (per family at bedside)    FAMILY HISTORY:    Family History of Cardiovascular Disease:  Yes [  ] No [ x ]  Coronary Artery Disease in first degree relative: Yes [  ] No [x  ]  Sudden Cardiac Death in First degree relative: Yes [  ] No [ x ]    HOME MEDICATIONS:  metoprolol succinate 25 mg oral tablet, extended release: 1 tab(s) orally once a day (14 Mar 2022 08:30)      CURRENT CARDIAC MEDICATIONS:  labetalol Injectable 10 milliGRAM(s) IV Push every 2 hours PRN SBP >130  metoprolol tartrate 12.5 milliGRAM(s) Oral every 12 hours  metoprolol tartrate Injectable 5 milliGRAM(s) IV Push every 6 hours PRN HR >110      CURRENT OTHER MEDICATIONS:  fentaNYL    Injectable 50 MICROGram(s) IV Push once, Stop order after: 1 Doses  propofol Infusion 10 MICROgram(s)/kG/Min (4.8 mL/Hr) IV Continuous <Continuous>  pantoprazole  Injectable 40 milliGRAM(s) IV Push daily  aspirin  chewable 81 milliGRAM(s) Oral daily  atorvastatin 20 milliGRAM(s) Oral at bedtime  chlorhexidine 0.12% Liquid 15 milliLiter(s) Oral Mucosa every 12 hours  chlorhexidine 2% Cloths 1 Application(s) Topical daily  dextrose 40% Gel 15 Gram(s) Oral once, Stop order after: 1 Doses  dextrose 5%. 1000 milliLiter(s) (50 mL/Hr) IV Continuous <Continuous>  dextrose 5%. 1000 milliLiter(s) (100 mL/Hr) IV Continuous <Continuous>  dextrose 50% Injectable 25 Gram(s) IV Push once, Stop order after: 1 Doses  dextrose 50% Injectable 12.5 Gram(s) IV Push once, Stop order after: 1 Doses  dextrose 50% Injectable 25 Gram(s) IV Push once, Stop order after: 1 Doses  enoxaparin Injectable 40 milliGRAM(s) SubCutaneous every 24 hours  glucagon  Injectable 1 milliGRAM(s) IntraMuscular once, Stop order after: 1 Doses  insulin lispro (ADMELOG) corrective regimen sliding scale   SubCutaneous every 6 hours  sodium chloride 0.9%. 1000 milliLiter(s) (50 mL/Hr) IV Continuous <Continuous>      ALLERGIES:   Allergy Status Unknown  hydrALAZINE (Other)      REVIEW OF SYMPTOMS:   Patient is intubated and cannot participate in exam.      VITAL SIGNS:  T(C): 36.6 (03-14-22 @ 11:07), Max: 36.9 (03-14-22 @ 04:45)  T(F): 97.9 (03-14-22 @ 11:07), Max: 98.5 (03-14-22 @ 04:45)  HR: 93 (03-14-22 @ 10:55) (68 - 137)  BP: 113/40 (03-14-22 @ 10:55) (83/50 - 143/60)  RR: 19 (03-14-22 @ 10:55) (11 - 21)  SpO2: 100% (03-14-22 @ 10:55) (99% - 100%)    INTAKE AND OUTPUT:     03-13 @ 07:01  -  03-14 @ 07:00  --------------------------------------------------------  IN: 810.4 mL / OUT: 622 mL / NET: 188.4 mL    03-14 @ 07:01  -  03-14 @ 12:04  --------------------------------------------------------  IN: 200 mL / OUT: 109 mL / NET: 91 mL      PHYSICAL EXAM:  Constitutional: Intubated  HEENT: Atraumatic and normocephalic , neck is supple . no JVD. No carotid bruit.  CNS: A&Ox3. No focal deficits.   Respiratory: CTAB, unlabored   Cardiovascular: Afib No murmur. No rubs or gallop.  Gastrointestinal: Soft, non-tender. +Bowel sounds.   Extremities: 2+ Peripheral Pulses, No clubbing, cyanosis, or edema  Psychiatric: Calm . no agitation.   Skin: Warm and dry, no ulcers on extremities     LABS:  ( 14 Mar 2022 11:01 )  Troponin T  <0.01,  CPK  26   , CKMB  X    , BNP X                   11.4   9.90  )-----------( 175      ( 14 Mar 2022 04:06 )             34.5     03-14    143  |  108<H>  |  12.7  ----------------------------<  148<H>  4.0   |  22.0  |  0.53    Ca    8.6      14 Mar 2022 04:06  Phos  3.6     03-14  Mg     1.9     03-14      PT/INR - ( 13 Mar 2022 20:51 )   PT: 12.6 sec;   INR: 1.09 ratio       PTT - ( 13 Mar 2022 20:51 )  PTT:24.3 sec    03-14-22 @ 04:06  CHolesterol: 196,  HDL: 46,  LDL: 130, Triglycerides: 102       Thyroid Stimulating Hormone, Serum: 0.64 uIU/mL (03-14-22 @ 04:06)      INTERPRETATION OF TELEMETRY:     ECG: Afib 90s with pauses (longest 6 seconds this morning)  Prior ECG: Yes [  ] No [x ]    RADIOLOGY & ADDITIONAL STUDIES:    X-ray:  reviewed  CT scan:     < from: CT Head No Cont (03.14.22 @ 05:25) >  INTERPRETATION:  CT HEAD    CLINICAL HISTORY: f/u mechanical thrombectomy, possible hemorrhage    TECHNIQUE:  Noncontrast CT.  Axial Acquisition.  Sagittal and coronal reformations.    COMPARISON:  Exam is compared to prior study of 3/13/2022    FINDINGS:  Regions of increased density within the left basal ganglia are again   seen. Favor contrast enhancement. Likely associated acute ischemia.    Acute infarct within the right PCA territory is better seen. Inferior   left cerebellar ischemia again noted    No midline shift or hydrocephalus.    No subdural or epidural collection.    Fluid is present within the left sphenoid sinus. Soft tissue is present   within the right external auditory canal.    IMPRESSION:  *  REGIONS OF INCREASED DENSITY WITHIN THE LEFT BASAL GANGLIA AGAIN SEEN.   FAVOR CONTRAST ENHANCEMENT. LIKELY ASSOCIATED ACUTE ISCHEMIA.  *  ACUTE RIGHT PCA INFARCT BETTER SEEN.  *  INFERIOR LEFT CEREBELLAR ISCHEMIA.  *  NO MIDLINE SHIFT OR HYDROCEPHALUS    --- End of Report ---    < end of copied text >  MRI:   US:

## 2022-03-14 NOTE — CONSULT NOTE ADULT - PROBLEM SELECTOR RECOMMENDATION 2
.  - s/p mechanical thrombectomy of Left ICA occlusion  - now with right sided stroke on CT scan  - plan for MRI today for evaluation of right sided CVA  - now with Afib   - pending TTE for evaluation of cardiac function and any valvular abnormalities  - will need THERESA possibly today while intubated    Plan discussed with Dr. Lynch and Dr. Jason and NONA Guerra.

## 2022-03-14 NOTE — DISCHARGE NOTE NURSING/CASE MANAGEMENT/SOCIAL WORK - NSDCFUADDAPPT_GEN_ALL_CORE_FT
Pt currently intubated, pt's son Phillip at bedside gives [facility] phone number of Pt's son/HCP Donny Rocha [phone]. Per pt's son Donny,  pt was independent PTA, using a cane. Pt lives with son, daughter-in-law, and three grandchildren. PCP: Cardiologist Dr Janneth Carroll in [facility]. RX: [facility] on [address], [phone]. Pt's son Donny declined services, appt, and meds to bed, states " I will take care of them when the time come". [facility] will continue to follow on pt's progress. Pt currently intubated, pt's son Phillip at bedside gives [facility] phone number of Pt's son/HCP Donny Rocha [phone]. Per pt's son Donny,  pt was independent PTA, using a cane. Pt lives with son, daughter-in-law, and three grandchildren. PCP: Cardiologist Dr Janneth Carroll in [facility]. RX: [facility] on [address], [phone]. Pt's son Donny declined services, appt, and meds to bed, states " I will take care of them when the time come". [facility] will continue to follow on pt's progress.    Updated note: Pt  on 3/17/2022 at 7:42 pm.

## 2022-03-14 NOTE — CONSULT NOTE ADULT - PROBLEM SELECTOR RECOMMENDATION 9
.  - TBONy3LCHS 6 - stroke risk 13.6% (moderate to high risk)     Due to: age, sex, HTN, stroke Hx  - new Afib 90s  - remains intubated in NSICU, currently CPAP, sedation off  - with pauses, longest 6 seconds likely due to vagal effect from reported nausea after hydralazine administration  - d/c hydralazine  - restart patient home dose of BB when able to take PO  - labetalol and metoprolol IVP to meet NSICU SBP goals  - check DDimer, CRP, troponin  - EP consult called  - will require AC when possible from NSICU standpoint

## 2022-03-14 NOTE — PROVIDER CONTACT NOTE (CHANGE IN STATUS NOTIFICATION) - SITUATION
Patient's HR dropped to 30s briefly and then went into Afib rate of 120-130s with drop in blood pressure.

## 2022-03-14 NOTE — PROVIDER CONTACT NOTE (CHANGE IN STATUS NOTIFICATION) - ACTION/TREATMENT ORDERED:
NONA Guerra aware and aware of VS at this time. As per PA, okay to give metoprolol for HR despite low BP

## 2022-03-14 NOTE — ED ADULT NURSE NOTE - PAIN RATING/NUMBER SCALE (0-10): ACTIVITY
You met with Dr. Jose Tripp.      Today's visit instructions:    Dr. Tripp would like to see you back in 3 months after you have had your TIPS procedure. If you have any issues in the meantime, please call us at the Nurse Advice line listed below.        If you have questions please contact Cinthia RN or Estelita RN during regular clinic hours, Monday through Friday 7:30 AM - 4:00 PM, or you can contact us via [employer] at anytime.       If you have urgent needs after-hours, weekends, or holidays please call the hospital at [phone] and ask to speak with our on-call General Surgery Team.    Appointment [phone]  Nurse Advice (Cinthia or Estelita): [phone]   Surgery Scheduler (Matty): [phone]  Fax: [phone]               0

## 2022-03-14 NOTE — PATIENT PROFILE ADULT - CONTRAINDICATIONS & PRECAUTIONS (SELECT ALL THAT APPLY)
(65yrs and older) History of severe allergic reaction to the High-Dose vaccine or vaccine ingredients including egg protein

## 2022-03-14 NOTE — PATIENT PROFILE ADULT - FALL HARM RISK - HARM RISK INTERVENTIONS

## 2022-03-14 NOTE — DISCHARGE NOTE NURSING/CASE MANAGEMENT/SOCIAL WORK - PATIENT PORTAL LINK FT
You can access the FollowMyHealth Patient Portal offered by [facility] by registering at the following website: [email or web address]. By joining [employer]’s FollowMyHealth portal, you will also be able to view your health information using other applications (apps) compatible with our system.

## 2022-03-14 NOTE — PROGRESS NOTE ADULT - ASSESSMENT
Assessment  89yo F with acute ischemic stroke, s/p mechanical thrombectomy L ICA Tici=3 revascularization. Post procedure day #1    Plan  cont neuro checks q1hr  propofol for light sedation  rpt head Ct this morning  keep -130, possible liberalize today after rpt head CT & if no hemorrhage  stroke w/u & core measures; echo, troponins, EKG, MRI, labs  Antiplatelets after rpt CT  high dose statin  NPO  Pt had difficult intubation, ENT eval prior to extubation  vent support, SBT this morning  possible remove bowers today  Lovenox for DVT prophylaxis, 24hr post procedure  Medical management as per NSICU

## 2022-03-14 NOTE — PROGRESS NOTE ADULT - ASSESSMENT
89 yo F with CVA due to L carotid occlusion, s/p TICI 3 MT. Likely of embolic etiology.  Multiple BL strokes on MRI.  Acute resp failure due to CVA; traumatic intubation in the field.  Large PFO, aortic ateroma.  Afib, newly diagnosed; episodes of cardiac pauses.  PMH HTN, HLD, chronic LBBB, covid 3 weeks ago.    [facility]  statin  hold therapeutic AC in view of large area of stroke  maintain -150  sedation with Propofol ggt and PRN Fentanyl IVP  vent support, CPAp as tolerated  pads on - may need transcutaneous pacing; d/c'ed BB; EP involved  NPO for now  IV fluids, monitor e-lytes  ongoing GOC discussion in view of significant brain injury and comorbidities    Pt is critically ill and at high risk of rapid deterioration/death due to abovementioned conditions.   Still requires critical care interventions - ongoing frequent evaluations, interventions and management adjustment by the Attending and ICU team, - and included review of relevant history, clinical examination, review of data and images, discussion of treatment with the multidisciplinary team and any consultants involved in this patient’s care as well as family discussion.

## 2022-03-15 LAB
ANION GAP SERPL CALC-SCNC: 11 MMOL/L — SIGNIFICANT CHANGE UP (ref 5–17)
BUN SERPL-MCNC: 14.9 MG/DL — SIGNIFICANT CHANGE UP (ref 8–20)
CALCIUM SERPL-MCNC: 8.3 MG/DL — LOW (ref 8.6–10.2)
CHLORIDE SERPL-SCNC: 110 MMOL/L — HIGH (ref 98–107)
CO2 SERPL-SCNC: 23 MMOL/L — SIGNIFICANT CHANGE UP (ref 22–29)
CREAT SERPL-MCNC: 0.68 MG/DL — SIGNIFICANT CHANGE UP (ref 0.5–1.3)
EGFR: 84 ML/MIN/1.73M2 — SIGNIFICANT CHANGE UP
GLUCOSE BLDC GLUCOMTR-MCNC: 133 MG/DL — HIGH (ref 70–99)
GLUCOSE BLDC GLUCOMTR-MCNC: 140 MG/DL — HIGH (ref 70–99)
GLUCOSE BLDC GLUCOMTR-MCNC: 150 MG/DL — HIGH (ref 70–99)
GLUCOSE SERPL-MCNC: 132 MG/DL — HIGH (ref 70–99)
HCT VFR BLD CALC: 30.5 % — LOW (ref 34.5–45)
HGB BLD-MCNC: 9.9 G/DL — LOW (ref 11.5–15.5)
MAGNESIUM SERPL-MCNC: 2 MG/DL — SIGNIFICANT CHANGE UP (ref 1.6–2.6)
MCHC RBC-ENTMCNC: 29.6 PG — SIGNIFICANT CHANGE UP (ref 27–34)
MCHC RBC-ENTMCNC: 32.5 GM/DL — SIGNIFICANT CHANGE UP (ref 32–36)
MCV RBC AUTO: 91.3 FL — SIGNIFICANT CHANGE UP (ref 80–100)
PHOSPHATE SERPL-MCNC: 2.5 MG/DL — SIGNIFICANT CHANGE UP (ref 2.4–4.7)
PLATELET # BLD AUTO: 144 K/UL — LOW (ref 150–400)
POTASSIUM SERPL-MCNC: 3.7 MMOL/L — SIGNIFICANT CHANGE UP (ref 3.5–5.3)
POTASSIUM SERPL-SCNC: 3.7 MMOL/L — SIGNIFICANT CHANGE UP (ref 3.5–5.3)
RBC # BLD: 3.34 M/UL — LOW (ref 3.8–5.2)
RBC # FLD: 13.8 % — SIGNIFICANT CHANGE UP (ref 10.3–14.5)
SODIUM SERPL-SCNC: 144 MMOL/L — SIGNIFICANT CHANGE UP (ref 135–145)
WBC # BLD: 9.76 K/UL — SIGNIFICANT CHANGE UP (ref 3.8–10.5)
WBC # FLD AUTO: 9.76 K/UL — SIGNIFICANT CHANGE UP (ref 3.8–10.5)

## 2022-03-15 PROCEDURE — 99291 CRITICAL CARE FIRST HOUR: CPT

## 2022-03-15 PROCEDURE — 93010 ELECTROCARDIOGRAM REPORT: CPT

## 2022-03-15 PROCEDURE — 99233 SBSQ HOSP IP/OBS HIGH 50: CPT

## 2022-03-15 PROCEDURE — 99232 SBSQ HOSP IP/OBS MODERATE 35: CPT

## 2022-03-15 RX ORDER — MIDAZOLAM HYDROCHLORIDE 1 MG/ML
2 INJECTION, SOLUTION INTRAMUSCULAR; INTRAVENOUS EVERY 6 HOURS
Refills: 0 | Status: DISCONTINUED | OUTPATIENT
Start: 2022-03-15 | End: 2022-03-17

## 2022-03-15 RX ORDER — ACETAMINOPHEN 500 MG
650 TABLET ORAL EVERY 6 HOURS
Refills: 0 | Status: DISCONTINUED | OUTPATIENT
Start: 2022-03-15 | End: 2022-03-16

## 2022-03-15 RX ORDER — MIDAZOLAM HYDROCHLORIDE 1 MG/ML
2 INJECTION, SOLUTION INTRAMUSCULAR; INTRAVENOUS ONCE
Refills: 0 | Status: DISCONTINUED | OUTPATIENT
Start: 2022-03-15 | End: 2022-03-15

## 2022-03-15 RX ORDER — POTASSIUM CHLORIDE 20 MEQ
20 PACKET (EA) ORAL ONCE
Refills: 0 | Status: COMPLETED | OUTPATIENT
Start: 2022-03-15 | End: 2022-03-15

## 2022-03-15 RX ORDER — FENTANYL CITRATE 50 UG/ML
50 INJECTION INTRAVENOUS ONCE
Refills: 0 | Status: DISCONTINUED | OUTPATIENT
Start: 2022-03-15 | End: 2022-03-15

## 2022-03-15 RX ORDER — POTASSIUM PHOSPHATE, MONOBASIC POTASSIUM PHOSPHATE, DIBASIC 236; 224 MG/ML; MG/ML
15 INJECTION, SOLUTION INTRAVENOUS ONCE
Refills: 0 | Status: COMPLETED | OUTPATIENT
Start: 2022-03-15 | End: 2022-03-15

## 2022-03-15 RX ORDER — SODIUM CHLORIDE 9 MG/ML
500 INJECTION INTRAMUSCULAR; INTRAVENOUS; SUBCUTANEOUS ONCE
Refills: 0 | Status: COMPLETED | OUTPATIENT
Start: 2022-03-15 | End: 2022-03-16

## 2022-03-15 RX ADMIN — FENTANYL CITRATE 50 MICROGRAM(S): 50 INJECTION INTRAVENOUS at 12:44

## 2022-03-15 RX ADMIN — ENOXAPARIN SODIUM 40 MILLIGRAM(S): 100 INJECTION SUBCUTANEOUS at 21:26

## 2022-03-15 RX ADMIN — FENTANYL CITRATE 50 MICROGRAM(S): 50 INJECTION INTRAVENOUS at 12:59

## 2022-03-15 RX ADMIN — MIDAZOLAM HYDROCHLORIDE 2 MILLIGRAM(S): 1 INJECTION, SOLUTION INTRAMUSCULAR; INTRAVENOUS at 20:18

## 2022-03-15 RX ADMIN — CHLORHEXIDINE GLUCONATE 1 APPLICATION(S): 213 SOLUTION TOPICAL at 12:48

## 2022-03-15 RX ADMIN — ATORVASTATIN CALCIUM 20 MILLIGRAM(S): 80 TABLET, FILM COATED ORAL at 21:26

## 2022-03-15 RX ADMIN — MIDAZOLAM HYDROCHLORIDE 2 MILLIGRAM(S): 1 INJECTION, SOLUTION INTRAMUSCULAR; INTRAVENOUS at 11:33

## 2022-03-15 RX ADMIN — FENTANYL CITRATE 50 MICROGRAM(S): 50 INJECTION INTRAVENOUS at 10:18

## 2022-03-15 RX ADMIN — PANTOPRAZOLE SODIUM 40 MILLIGRAM(S): 20 TABLET, DELAYED RELEASE ORAL at 12:29

## 2022-03-15 RX ADMIN — FENTANYL CITRATE 50 MICROGRAM(S): 50 INJECTION INTRAVENOUS at 10:03

## 2022-03-15 RX ADMIN — CHLORHEXIDINE GLUCONATE 15 MILLILITER(S): 213 SOLUTION TOPICAL at 18:09

## 2022-03-15 RX ADMIN — Medication 650 MILLIGRAM(S): at 15:00

## 2022-03-15 RX ADMIN — Medication 1.25 MILLIGRAM(S): at 13:18

## 2022-03-15 RX ADMIN — Medication 650 MILLIGRAM(S): at 16:00

## 2022-03-15 RX ADMIN — Medication 650 MILLIGRAM(S): at 21:27

## 2022-03-15 RX ADMIN — Medication 650 MILLIGRAM(S): at 23:45

## 2022-03-15 RX ADMIN — Medication 81 MILLIGRAM(S): at 12:29

## 2022-03-15 RX ADMIN — Medication 20 MILLIEQUIVALENT(S): at 05:45

## 2022-03-15 RX ADMIN — Medication 1.25 MILLIGRAM(S): at 20:01

## 2022-03-15 RX ADMIN — PROPOFOL 4.8 MICROGRAM(S)/KG/MIN: 10 INJECTION, EMULSION INTRAVENOUS at 20:50

## 2022-03-15 RX ADMIN — CHLORHEXIDINE GLUCONATE 15 MILLILITER(S): 213 SOLUTION TOPICAL at 05:38

## 2022-03-15 RX ADMIN — SODIUM CHLORIDE 50 MILLILITER(S): 9 INJECTION INTRAMUSCULAR; INTRAVENOUS; SUBCUTANEOUS at 05:39

## 2022-03-15 RX ADMIN — POTASSIUM PHOSPHATE, MONOBASIC POTASSIUM PHOSPHATE, DIBASIC 62.5 MILLIMOLE(S): 236; 224 INJECTION, SOLUTION INTRAVENOUS at 06:32

## 2022-03-15 NOTE — PROGRESS NOTE ADULT - PROBLEM SELECTOR PLAN 1
.  - [id number] 6 - stroke risk 13.6% (moderate to high risk)     Due to: age, sex, HTN, stroke Hx  - now   - remains intubated in NSICU, currently CPAP, sedation off  - with CHB, BB d/c  - appreciate EP consult and reccs  - check DDimer, CRP, troponin  - will require AC eventually when possible from NSICU standpoint.

## 2022-03-15 NOTE — DIETITIAN INITIAL EVALUATION ADULT. - OTHER INFO
bladder tumor Pt is a 88 year old F with PMH HTN, HLD, chronic LBBB, covid 3 weeks ago who presented to [facility] with unresponsiveness. Per ED physician who obtained history from patient's son, patient's last known well was 3/12 2200, walking and talking normally. Patient then found this am speaking normally around 0930 when had sudden ? syncopal episode. EMS called, found to be unresponsive and possible seizure-like activity in the field. Intubation attempted in field and unsuccessful, traumatic hemoptysis with estimated 250cc blood loss. Taken to [facility] where she was intubated there. Code stroke initiated, found to have L carotid occlusion. Was transferred to [facility] for mechanical thrombectomy. Unable to obtain ROS 2/2 intubation/sedation.  Aware of ongoing GOC discussion in view of significant brain injury and comorbidities. Pt remains NPO at this time.

## 2022-03-15 NOTE — DIETITIAN INITIAL EVALUATION ADULT. - PERTINENT LABORATORY DATA
03-15 Na144 mmol/L Glu 132 mg/dL<H> K+ 3.7 mmol/L Cr  0.68 mg/dL BUN 14.9 mg/dL Phos 2.5 mg/dL Alb n/a   PAB n/a

## 2022-03-15 NOTE — PROGRESS NOTE ADULT - ASSESSMENT
Assessment:   87 yo F with PMH HTN, HLD, chronic LBBB, COVID 3 weeks ago who presented to [facility] with syncope described as sudden loss of consciousness with possible seizure-like activity at 0930 3/13/22.  Intubation attempted in field and unsuccessful, with traumatic hemoptysis (EBL 250cc). She was taken to [facility] where she was intubated and Code Stroke initiated. CT demonstrated left carotid occlusion and patient was transferred to [facility] for mechanical thrombectomy. Post operatively, pt has thus far remained unresponsive and repeat CT demonstrates likely acute ischemia of the left basal ganglia, and acute infarct of the right PCA and inferior left cerebellum. Pt is now noted to have paroxysmal AF with RVR to 130s bpm, as well as periods of bradycardia in sinus rhythm. EP is consulted for AF with RVR and high grade AV block.    Recommendations:   1. Complete heart block  - Baseline LBBB (reportedly chronic), left axis deviation, 1st degree AVB w/ FL ~240s - 270s on tele.   - pauses up to~ 6 seconds due to CHB with sinus rhythm at 90sbpm.   - no further pauses or bradycardia overnight   - If pt is likely to make meaningful neurologic recover, she will need pacing support.   - Leadless pacemaker would be most appropriate for this pt. This would require post op bedrest w/ leg immobilized x 6 hours.   - If meaningful recovery is expected, would prefer to perform pacemaker while pt is intubated & keep intubated for post op recovery period.   - Would AVOID beta blockade or other AV jd blocking agents for now.   - Maintain AED patches secured in place w/ pacing-capable AED immediately available in case of prolonged pauses.   - Would consider temporary pacing wire if significant recurrent pauses.      2. Paroxysmal AF w/ RVR  - as above, would AVOID beta blockade or other AV jd blocking agents for now.  - Ok to tolerate HRs up to 150s bpm without intervention for now.   - CHADS-VASC = 4 (age x 2, female, HTN). If meaningful recovery, will need to discuss anticoagulation.    Assessment:   89 yo F with PMH HTN, HLD, chronic LBBB, COVID 3 weeks ago who presented to [facility] with syncope described as sudden loss of consciousness with possible seizure-like activity at 0930 3/13/22.  Intubation attempted in field and unsuccessful, with traumatic hemoptysis (EBL 250cc). She was taken to [facility] where she was intubated and Code Stroke initiated. CT demonstrated left carotid occlusion and patient was transferred to [facility] for mechanical thrombectomy. Post operatively, pt has thus far remained unresponsive and repeat CT demonstrates likely acute ischemia of the left basal ganglia, and acute infarct of the right PCA and inferior left cerebellum. Pt is now noted to have paroxysmal AF with RVR to 130s bpm, as well as periods of bradycardia in sinus rhythm. EP is consulted for AF with RVR and high grade AV block.    Recommendations:   1. Complete heart block  - Baseline LBBB (reportedly chronic), left axis deviation, 1st degree AVB w/ IA ~240s - 270s on tele.   - pauses up to~ 6 seconds due to CHB with sinus rhythm at 90sbpm.   - no further pauses or bradycardia overnight   - If pt is likely to make meaningful neurologic recover, she will need pacing support.   - Leadless pacemaker would be most appropriate for this pt. This would require post op bedrest w/ leg immobilized x 6 hours.   - If meaningful recovery is expected, would prefer to perform pacemaker while pt is intubated & keep intubated for post op recovery period.   - Would AVOID beta blockade or other AV jd blocking agents for now.   - Maintain AED patches secured in place w/ pacing-capable AED immediately available in case of prolonged pauses.   - Would consider temporary pacing wire if significant recurrent pauses.      2. Paroxysmal AF w/ RVR  - as above, would AVOID beta blockade or other AV jd blocking agents for now.  - Ok to tolerate HRs up to 150s bpm without intervention for now.   - CHADS-VASC = 4 (age x 2, female, HTN). If meaningful recovery, will need to discuss anticoagulation.     3. HTN   - management as per general cardiology

## 2022-03-15 NOTE — PROVIDER CONTACT NOTE (OTHER) - DATE AND TIME:
15-Mar-2022 09:00
15-Mar-2022 10:00
13-Mar-2022 19:50
15-Mar-2022 11:03
15-Mar-2022 13:00
15-Mar-2022 08:01
15-Mar-2022 13:33
15-Mar-2022 20:45
15-Mar-2022 12:10

## 2022-03-15 NOTE — PROVIDER CONTACT NOTE (OTHER) - ACTION/TREATMENT ORDERED:
As per neuro team, wait another 15 min and if high, give enalaprit
Fentanyl IVP pending, BP meds to be discussed with team.
Provider aware
Allow patient to settle, recheck BP
Fentanyl ordered
Hydrazline PRN given per order
Team at bedside
Versed IVP order pending
restarted propofol ggt at 10 mcg

## 2022-03-15 NOTE — PROVIDER CONTACT NOTE (OTHER) - NAME OF MD/NP/PA/DO NOTIFIED:
[facility]
Rodolfo
Rosie CASTELLANO
[facility]
Enedina
neuro team

## 2022-03-15 NOTE — PROGRESS NOTE ADULT - ATTENDING COMMENTS
No new pauses. Patient with multiple brain infarcts. Remains unresponsive off sedation. Goals of care being discussed. Would only consider a pacemaker in the future if the patient makes a neurological recovery and consistent with goals of care.
Patient with left carotid occlusion due to embolism contributing factors new onset atrial fibrillation, recent COVID infection contributing to hypercoagulable state. Brain MRI showed a left basal ganglia and casas radiata stroke with moderate  hemorrhagic transformation. There also shower emboli into the left cerebellum and right PCA territory stroke. A conversation was held with the oldest son and sister and the oldest son claims her mother wouldn't have wanted to be intubated or treated aggressively if no possibility of meaningful recovery. I explain to him that the prognosis of bilateral strokes at her age even though it was early in the disease she might possibly need a feeding tube and 24 hr care. They will have a meeting with palliative care since there is a big family. As for now hold any anticoagulation. Antiplatelet therapy with ASA 81 mg will provide some  protection in the meantime.
No other episodes of pause or transietnt heart block. no other episodes of gagging or vagal.    MRi shows trivial intracerebral bleeding.  THERESA shows.: no clot.  + pfo and sevrere aortic arch atheroma.  cerebrovascular accident : carotid thrombus and cardioembolic due to  atrial fibrillation and aortic arch atheroma with recent covid (hypercogulable state)  aspiirn statins.  bpo control with vaso sisi as per neuro ICu recs.   anticoagulation once bleedin grisk low .  transient heart block: EP follow up and recommendations. EP   No further general cardiology recommendations.    will sign off.   please call for further questions. EP to followu pfor the transiety heart block and pause.

## 2022-03-15 NOTE — PROGRESS NOTE ADULT - PROBLEM SELECTOR PLAN 2
.  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  - currently in   - THERESA performed - PFO with right-to-left shunting and severe aortic arch atheroma.  - start aspirin/ statin  - AC to start when deemed stable by NSICU team

## 2022-03-15 NOTE — DIETITIAN INITIAL EVALUATION ADULT. - ENERGY INTAKE
NPO at this time. Detail Level: Zone Tazorac Pregnancy And Lactation Text: This medication is not safe during pregnancy. It is unknown if this medication is excreted in breast milk. Erythromycin Pregnancy And Lactation Text: This medication is Pregnancy Category B and is considered safe during pregnancy. It is also excreted in breast milk. Sarecycline Counseling: Patient advised regarding possible photosensitivity and discoloration of the teeth, skin, lips, tongue and gums.  Patient instructed to avoid sunlight, if possible.  When exposed to sunlight, patients should wear protective clothing, sunglasses, and sunscreen.  The patient was instructed to call the office immediately if the following severe adverse effects occur:  hearing changes, easy bruising/bleeding, severe headache, or vision changes.  The patient verbalized understanding of the proper use and possible adverse effects of sarecycline.  All of the patient's questions and concerns were addressed. Topical Clindamycin Pregnancy And Lactation Text: This medication is Pregnancy Category B and is considered safe during pregnancy. It is unknown if it is excreted in breast milk. Minocycline Pregnancy And Lactation Text: This medication is Pregnancy Category D and not consider safe during pregnancy. It is also excreted in breast milk. Benzoyl Peroxide Pregnancy And Lactation Text: This medication is Pregnancy Category C. It is unknown if benzoyl peroxide is excreted in breast milk. Tazorac Counseling:  Patient advised that medication is irritating and drying.  Patient may need to apply sparingly and wash off after an hour before eventually leaving it on overnight.  The patient verbalized understanding of the proper use and possible adverse effects of tazorac.  All of the patient's questions and concerns were addressed. High Dose Vitamin A Counseling: Side effects reviewed, pt to contact office should one occur. Minocycline Counseling: Patient advised regarding possible photosensitivity and discoloration of the teeth, skin, lips, tongue and gums.  Patient instructed to avoid sunlight, if possible.  When exposed to sunlight, patients should wear protective clothing, sunglasses, and sunscreen.  The patient was instructed to call the office immediately if the following severe adverse effects occur:  hearing changes, easy bruising/bleeding, severe headache, or vision changes.  The patient verbalized understanding of the proper use and possible adverse effects of minocycline.  All of the patient's questions and concerns were addressed. Bactrim Counseling:  I discussed with the patient the risks of sulfa antibiotics including but not limited to GI upset, allergic reaction, drug rash, diarrhea, dizziness, photosensitivity, and yeast infections.  Rarely, more serious reactions can occur including but not limited to aplastic anemia, agranulocytosis, methemoglobinemia, blood dyscrasias, liver or kidney failure, lung infiltrates or desquamative/blistering drug rashes. Include Pregnancy/Lactation Warning?: No Topical Sulfur Applications Pregnancy And Lactation Text: This medication is Pregnancy Category C and has an unknown safety profile during pregnancy. It is unknown if this topical medication is excreted in breast milk. Topical Sulfur Applications Counseling: Topical Sulfur Counseling: Patient counseled that this medication may cause skin irritation or allergic reactions.  In the event of skin irritation, the patient was advised to reduce the amount of the drug applied or use it less frequently.   The patient verbalized understanding of the proper use and possible adverse effects of topical sulfur application.  All of the patient's questions and concerns were addressed. Topical Retinoid Pregnancy And Lactation Text: This medication is Pregnancy Category C. It is unknown if this medication is excreted in breast milk. Doxycycline Counseling:  Patient counseled regarding possible photosensitivity and increased risk for sunburn.  Patient instructed to avoid sunlight, if possible.  When exposed to sunlight, patients should wear protective clothing, sunglasses, and sunscreen.  The patient was instructed to call the office immediately if the following severe adverse effects occur:  hearing changes, easy bruising/bleeding, severe headache, or vision changes.  The patient verbalized understanding of the proper use and possible adverse effects of doxycycline.  All of the patient's questions and concerns were addressed. Doxycycline Pregnancy And Lactation Text: This medication is Pregnancy Category D and not consider safe during pregnancy. It is also excreted in breast milk but is considered safe for shorter treatment courses. Spironolactone Counseling: Patient advised regarding risks of diarrhea, abdominal pain, hyperkalemia, birth defects (for female patients), liver toxicity and renal toxicity. The patient may need blood work to monitor liver and kidney function and potassium levels while on therapy. The patient verbalized understanding of the proper use and possible adverse effects of spironolactone.  All of the patient's questions and concerns were addressed. Erythromycin Counseling:  I discussed with the patient the risks of erythromycin including but not limited to GI upset, allergic reaction, drug rash, diarrhea, increase in liver enzymes, and yeast infections. Spironolactone Pregnancy And Lactation Text: This medication can cause feminization of the male fetus and should be avoided during pregnancy. The active metabolite is also found in breast milk. Topical Retinoid counseling:  Patient advised to apply a pea-sized amount only at bedtime and wait 30 minutes after washing their face before applying.  If too drying, patient may add a non-comedogenic moisturizer. The patient verbalized understanding of the proper use and possible adverse effects of retinoids.  All of the patient's questions and concerns were addressed. Birth Control Pills Counseling: Birth Control Pill Counseling: I discussed with the patient the potential side effects of OCPs including but not limited to increased risk of stroke, heart attack, thrombophlebitis, deep venous thrombosis, hepatic adenomas, breast changes, GI upset, headaches, and depression.  The patient verbalized understanding of the proper use and possible adverse effects of OCPs. All of the patient's questions and concerns were addressed. High Dose Vitamin A Pregnancy And Lactation Text: High dose vitamin A therapy is contraindicated during pregnancy and breast feeding. Birth Control Pills Pregnancy And Lactation Text: This medication should be avoided if pregnant and for the first 30 days post-partum. Isotretinoin Counseling: Patient should get monthly blood tests, not donate blood, not drive at night if vision affected, not share medication, and not undergo elective surgery for 6 months after tx completed. Side effects reviewed, pt to contact office should one occur. Tetracycline Counseling: Patient counseled regarding possible photosensitivity and increased risk for sunburn.  Patient instructed to avoid sunlight, if possible.  When exposed to sunlight, patients should wear protective clothing, sunglasses, and sunscreen.  The patient was instructed to call the office immediately if the following severe adverse effects occur:  hearing changes, easy bruising/bleeding, severe headache, or vision changes.  The patient verbalized understanding of the proper use and possible adverse effects of tetracycline.  All of the patient's questions and concerns were addressed. Patient understands to avoid pregnancy while on therapy due to potential birth defects. Benzoyl Peroxide Counseling: Patient counseled that medicine may cause skin irritation and bleach clothing.  In the event of skin irritation, the patient was advised to reduce the amount of the drug applied or use it less frequently.   The patient verbalized understanding of the proper use and possible adverse effects of benzoyl peroxide.  All of the patient's questions and concerns were addressed. Azithromycin Pregnancy And Lactation Text: This medication is considered safe during pregnancy and is also secreted in breast milk. Isotretinoin Pregnancy And Lactation Text: This medication is Pregnancy Category X and is considered extremely dangerous during pregnancy. It is unknown if it is excreted in breast milk. Topical Clindamycin Counseling: Patient counseled that this medication may cause skin irritation or allergic reactions.  In the event of skin irritation, the patient was advised to reduce the amount of the drug applied or use it less frequently.   The patient verbalized understanding of the proper use and possible adverse effects of clindamycin.  All of the patient's questions and concerns were addressed. Bactrim Pregnancy And Lactation Text: This medication is Pregnancy Category D and is known to cause fetal risk.  It is also excreted in breast milk. Dapsone Counseling: I discussed with the patient the risks of dapsone including but not limited to hemolytic anemia, agranulocytosis, rashes, methemoglobinemia, kidney failure, peripheral neuropathy, headaches, GI upset, and liver toxicity.  Patients who start dapsone require monitoring including baseline LFTs and weekly CBCs for the first month, then every month thereafter.  The patient verbalized understanding of the proper use and possible adverse effects of dapsone.  All of the patient's questions and concerns were addressed. Azithromycin Counseling:  I discussed with the patient the risks of azithromycin including but not limited to GI upset, allergic reaction, drug rash, diarrhea, and yeast infections. Dapsone Pregnancy And Lactation Text: This medication is Pregnancy Category C and is not considered safe during pregnancy or breast feeding.

## 2022-03-15 NOTE — PROGRESS NOTE ADULT - ASSESSMENT
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 Likely of embolic etiology.  Multiple BL strokes on MRI. Poor neuro exam.  Acute resp failure due to CVA; traumatic intubation in the field.  Large PFO, aortic ateroma.  Afib, newly diagnosed; episodes of cardiac pauses.  PMH HTN, HLD, chronic LBBB, covid 3 weeks ago.    [facility]  [facility]  statin  hold therapeutic AC in view of large area of stroke  maintain -160, BP control with Enalaprilat IV   sedation with Propofol ggt and PRN Fentanyl IVP  vent support, CPAp as tolerated  pads on - may need transcutaneous pacing; no BB or other AV blockers; EP involved  NPO for now  IV fluids, monitor e-lytes  ongoing GOC discussion in view of significant brain injury and comorbidities, planning family meeting tomorrow    Pt is critically ill and at high risk of rapid deterioration/death due to abovementioned conditions.   Still requires critical care interventions - ongoing frequent evaluations, interventions and management adjustment by the Attending and ICU team, - and included review of relevant history, clinical examination, review of data and images, discussion of treatment with the multidisciplinary team and any consultants involved in this patient’s care as well as family discussion.

## 2022-03-16 LAB
-  AMIKACIN: SIGNIFICANT CHANGE UP
-  AMOXICILLIN/CLAVULANIC ACID: SIGNIFICANT CHANGE UP
-  AMPICILLIN/SULBACTAM: SIGNIFICANT CHANGE UP
-  AMPICILLIN: SIGNIFICANT CHANGE UP
-  AZTREONAM: SIGNIFICANT CHANGE UP
-  CEFAZOLIN: SIGNIFICANT CHANGE UP
-  CEFEPIME: SIGNIFICANT CHANGE UP
-  CEFOXITIN: SIGNIFICANT CHANGE UP
-  CEFTRIAXONE: SIGNIFICANT CHANGE UP
-  CIPROFLOXACIN: SIGNIFICANT CHANGE UP
-  ERTAPENEM: SIGNIFICANT CHANGE UP
-  GENTAMICIN: SIGNIFICANT CHANGE UP
-  IMIPENEM: SIGNIFICANT CHANGE UP
-  LEVOFLOXACIN: SIGNIFICANT CHANGE UP
-  MEROPENEM: SIGNIFICANT CHANGE UP
-  NITROFURANTOIN: SIGNIFICANT CHANGE UP
-  PIPERACILLIN/TAZOBACTAM: SIGNIFICANT CHANGE UP
-  TIGECYCLINE: SIGNIFICANT CHANGE UP
-  TOBRAMYCIN: SIGNIFICANT CHANGE UP
-  TRIMETHOPRIM/SULFAMETHOXAZOLE: SIGNIFICANT CHANGE UP
ANION GAP SERPL CALC-SCNC: 10 MMOL/L — SIGNIFICANT CHANGE UP (ref 5–17)
APPEARANCE UR: CLEAR — SIGNIFICANT CHANGE UP
BACTERIA # UR AUTO: ABNORMAL
BILIRUB UR-MCNC: NEGATIVE — SIGNIFICANT CHANGE UP
BUN SERPL-MCNC: 13.1 MG/DL — SIGNIFICANT CHANGE UP (ref 8–20)
CALCIUM SERPL-MCNC: 7.9 MG/DL — LOW (ref 8.6–10.2)
CHLORIDE SERPL-SCNC: 108 MMOL/L — HIGH (ref 98–107)
CLOSURE TME COLL+EPINEP BLD: 99 K/UL — LOW (ref 150–400)
CO2 SERPL-SCNC: 23 MMOL/L — SIGNIFICANT CHANGE UP (ref 22–29)
COLOR SPEC: YELLOW — SIGNIFICANT CHANGE UP
CREAT SERPL-MCNC: 0.6 MG/DL — SIGNIFICANT CHANGE UP (ref 0.5–1.3)
CULTURE RESULTS: SIGNIFICANT CHANGE UP
DIFF PNL FLD: ABNORMAL
EGFR: 86 ML/MIN/1.73M2 — SIGNIFICANT CHANGE UP
EPI CELLS # UR: SIGNIFICANT CHANGE UP
GLUCOSE BLDC GLUCOMTR-MCNC: 101 MG/DL — HIGH (ref 70–99)
GLUCOSE BLDC GLUCOMTR-MCNC: 109 MG/DL — HIGH (ref 70–99)
GLUCOSE BLDC GLUCOMTR-MCNC: 112 MG/DL — HIGH (ref 70–99)
GLUCOSE BLDC GLUCOMTR-MCNC: 113 MG/DL — HIGH (ref 70–99)
GLUCOSE SERPL-MCNC: 113 MG/DL — HIGH (ref 70–99)
GLUCOSE UR QL: NEGATIVE MG/DL — SIGNIFICANT CHANGE UP
HCT VFR BLD CALC: 27.4 % — LOW (ref 34.5–45)
HGB BLD-MCNC: 8.8 G/DL — LOW (ref 11.5–15.5)
KETONES UR-MCNC: NEGATIVE — SIGNIFICANT CHANGE UP
LEUKOCYTE ESTERASE UR-ACNC: ABNORMAL
MAGNESIUM SERPL-MCNC: 2 MG/DL — SIGNIFICANT CHANGE UP (ref 1.6–2.6)
MCHC RBC-ENTMCNC: 30.1 PG — SIGNIFICANT CHANGE UP (ref 27–34)
MCHC RBC-ENTMCNC: 32.1 GM/DL — SIGNIFICANT CHANGE UP (ref 32–36)
MCV RBC AUTO: 93.8 FL — SIGNIFICANT CHANGE UP (ref 80–100)
METHOD TYPE: SIGNIFICANT CHANGE UP
NITRITE UR-MCNC: NEGATIVE — SIGNIFICANT CHANGE UP
ORGANISM # SPEC MICROSCOPIC CNT: SIGNIFICANT CHANGE UP
ORGANISM # SPEC MICROSCOPIC CNT: SIGNIFICANT CHANGE UP
PH UR: 6 — SIGNIFICANT CHANGE UP (ref 5–8)
PHOSPHATE SERPL-MCNC: 2 MG/DL — LOW (ref 2.4–4.7)
PLATELET # BLD AUTO: 84 K/UL — LOW (ref 150–400)
POTASSIUM SERPL-MCNC: 3.6 MMOL/L — SIGNIFICANT CHANGE UP (ref 3.5–5.3)
POTASSIUM SERPL-SCNC: 3.6 MMOL/L — SIGNIFICANT CHANGE UP (ref 3.5–5.3)
PROCALCITONIN SERPL-MCNC: 0.12 NG/ML — HIGH (ref 0.02–0.1)
PROT UR-MCNC: NEGATIVE — SIGNIFICANT CHANGE UP
RBC # BLD: 2.92 M/UL — LOW (ref 3.8–5.2)
RBC # FLD: 13.7 % — SIGNIFICANT CHANGE UP (ref 10.3–14.5)
RBC CASTS # UR COMP ASSIST: ABNORMAL /HPF (ref 0–4)
SODIUM SERPL-SCNC: 141 MMOL/L — SIGNIFICANT CHANGE UP (ref 135–145)
SP GR SPEC: 1 — LOW (ref 1.01–1.02)
SPECIMEN SOURCE: SIGNIFICANT CHANGE UP
UROBILINOGEN FLD QL: NEGATIVE MG/DL — SIGNIFICANT CHANGE UP
WBC # BLD: 7.21 K/UL — SIGNIFICANT CHANGE UP (ref 3.8–10.5)
WBC # FLD AUTO: 7.21 K/UL — SIGNIFICANT CHANGE UP (ref 3.8–10.5)
WBC UR QL: SIGNIFICANT CHANGE UP /HPF (ref 0–5)

## 2022-03-16 PROCEDURE — 99233 SBSQ HOSP IP/OBS HIGH 50: CPT

## 2022-03-16 PROCEDURE — 99223 1ST HOSP IP/OBS HIGH 75: CPT

## 2022-03-16 PROCEDURE — 99497 ADVNCD CARE PLAN 30 MIN: CPT | Mod: 25

## 2022-03-16 PROCEDURE — 99291 CRITICAL CARE FIRST HOUR: CPT

## 2022-03-16 RX ORDER — POLYETHYLENE GLYCOL 3350 17 G/17G
17 POWDER, FOR SOLUTION ORAL DAILY
Refills: 0 | Status: DISCONTINUED | OUTPATIENT
Start: 2022-03-16 | End: 2022-03-16

## 2022-03-16 RX ORDER — ACETAMINOPHEN 500 MG
650 TABLET ORAL EVERY 6 HOURS
Refills: 0 | Status: DISCONTINUED | OUTPATIENT
Start: 2022-03-16 | End: 2022-03-17

## 2022-03-16 RX ORDER — POTASSIUM CHLORIDE 20 MEQ
20 PACKET (EA) ORAL ONCE
Refills: 0 | Status: COMPLETED | OUTPATIENT
Start: 2022-03-16 | End: 2022-03-16

## 2022-03-16 RX ORDER — FENTANYL CITRATE 50 UG/ML
25 INJECTION INTRAVENOUS
Refills: 0 | Status: DISCONTINUED | OUTPATIENT
Start: 2022-03-16 | End: 2022-03-17

## 2022-03-16 RX ORDER — SENNA PLUS 8.6 MG/1
2 TABLET ORAL AT BEDTIME
Refills: 0 | Status: DISCONTINUED | OUTPATIENT
Start: 2022-03-16 | End: 2022-03-16

## 2022-03-16 RX ORDER — POTASSIUM PHOSPHATE, MONOBASIC POTASSIUM PHOSPHATE, DIBASIC 236; 224 MG/ML; MG/ML
15 INJECTION, SOLUTION INTRAVENOUS ONCE
Refills: 0 | Status: COMPLETED | OUTPATIENT
Start: 2022-03-16 | End: 2022-03-16

## 2022-03-16 RX ADMIN — CHLORHEXIDINE GLUCONATE 1 APPLICATION(S): 213 SOLUTION TOPICAL at 11:28

## 2022-03-16 RX ADMIN — Medication 20 MILLIEQUIVALENT(S): at 05:34

## 2022-03-16 RX ADMIN — SODIUM CHLORIDE 1000 MILLILITER(S): 9 INJECTION INTRAMUSCULAR; INTRAVENOUS; SUBCUTANEOUS at 00:09

## 2022-03-16 RX ADMIN — Medication 650 MILLIGRAM(S): at 11:19

## 2022-03-16 RX ADMIN — CHLORHEXIDINE GLUCONATE 15 MILLILITER(S): 213 SOLUTION TOPICAL at 17:08

## 2022-03-16 RX ADMIN — Medication 650 MILLIGRAM(S): at 10:49

## 2022-03-16 RX ADMIN — POTASSIUM PHOSPHATE, MONOBASIC POTASSIUM PHOSPHATE, DIBASIC 62.5 MILLIMOLE(S): 236; 224 INJECTION, SOLUTION INTRAVENOUS at 05:35

## 2022-03-16 RX ADMIN — Medication 1.25 MILLIGRAM(S): at 19:14

## 2022-03-16 RX ADMIN — Medication 81 MILLIGRAM(S): at 11:13

## 2022-03-16 RX ADMIN — POLYETHYLENE GLYCOL 3350 17 GRAM(S): 17 POWDER, FOR SOLUTION ORAL at 11:05

## 2022-03-16 RX ADMIN — PROPOFOL 4.8 MICROGRAM(S)/KG/MIN: 10 INJECTION, EMULSION INTRAVENOUS at 17:11

## 2022-03-16 RX ADMIN — PANTOPRAZOLE SODIUM 40 MILLIGRAM(S): 20 TABLET, DELAYED RELEASE ORAL at 11:13

## 2022-03-16 RX ADMIN — CHLORHEXIDINE GLUCONATE 15 MILLILITER(S): 213 SOLUTION TOPICAL at 05:33

## 2022-03-16 NOTE — CONSULT NOTE ADULT - ASSESSMENT
89 y/o F with PMH HTN, HLD, chronic LBBB, recent covid ifx  (3 weeks ago) who presented to [facility] with unresponsiveness. Patient's last known well was 3/12 2200, walking and talking normally. Per H&P was found speaking normally around 0930am on 3/13 when she had sudden possibly syncopal episode.  Unsuccessful intubated in the field, intubated at [facility]. Code stroke initiated, found to have L carotid occlusion and transferred to [facility] for mechanical thrombectomy.  She is now POD1 s/p mechanical thrombectomy of LEFT ICA occlusion and remains intubated in the NSICU.  Cardiology consult called for new Afib starting this morning. At 7AM patient was administered hydralazine IVP for HTN after which patient reportedly developed nausea/gagging (vasovagal ?) and 6 second pause was noted on Tele. Hydralazine was again administered at around 10 am with similar effect, with subsequent pause of 3.5 seconds. At that time rhythm was ST but after converted to PROMISE to 130s. She was given 5mg IVP metoprolol for HR and is now  rate controlled in AFIB 90s. During these events patient was on CPAP and sedation was on hold. Of note patient is also with new right sided CVA.  She is pending MRI today. Patient is intubated and cannot participate in ROS.   
87 yo F with PMH HTN, HLD, chronic LBBB, COVID 3 weeks ago who presented to [facility] with syncope described as sudden loss of consciousness with possible  seizure-like activity at 0930 3/13/22.  Intubation attempted in field and unsuccessful, with traumatic hemoptysis (EBL 250cc). She was taken to [facility] where she was intubated and Code Stroke initiated. CT demonstrated left carotid occlusion and patient was transferred to [facility] for mechanical thrombectomy. Post operatively, pt has thus far remained unresponsive and repeat CT demonstrates likely acute ischemia of the left basal ganglia, and acute infarct of the right PCA and inferior left cerebellum. Pt is now noted to have paroxysmal AF with RVR to 130s bpm, as well as periods of bradycardia in sinus rhythm. EP is consulted for AF with RVR and high grade AV block.    Recommendations:   1. Complete heart block  - Baseline LBBB (reportedly chronic), left axis deviation, 1st degree AVB w/ GA ~240s - 270s on tele.   - pauses up to~ 6 seconds due to CHB with sinus rhythm at 90sbpm.   - If pt is likely to make meaningful neurologic recover, she will need pacing support.   - Leadless pacemaker would be most appropriate for this pt. This would require post op bedrest w/ leg immobilized x 6 hours.   - If meaningful recovery is expected, would prefer to perform pacemaker while pt is intubated & keep intubated for post op recovery period.   - Await transthoracic echo   - Would AVOID beta blockade or other AV jd blocking agents for now.   - Maintain AED patches secured in place w/ pacing-capable AED immediately available in case of prolonged pauses.   - Would consider temporary pacing wire if significant recurrent pauses.        2. Paroxysmal AF w/ RVR  - as above, would AVOID beta blockade or other AV jd blocking agents for now.  - Ok to tolerate HRs up to 150s bpm without intervention for now.   - CHADS-VASC = 4 (age x 2, female, HTN). If meaningful recovery, will need to discuss anticoagulation.     Will d/w Dr. Hartmann, further recs to follow. 
89yo F with hx of HTN, HLD, chronic LBBB, recent covid (feb 2022) transferred from [facility] after found to have Lt ICA occlusion s/p emergent mechanical thrombectomy complicated by rapid Afib, complete heart block and subsequent MRI with multiple bilateral embolic infarcts, BLE DVTs and overall poor prognosis.    PLAN    Multiple Embolic CVAs  - CTH and MRI noted above   - s/p Lt thrombectomy on 3/13 by NeuroIR  - overall poor prognosis for meaningful neurological recovery to previous baseline function, likely to need trach and peg if in line with goc by family  - mgnt per NSX and NSICU     Acute Respiratory Failure   - intubated and sedated, on vent support, wean as tolerated    New Onset Afib   - rate control and anticoagulation per cardio/NSICU    BLE DVT  - anticoagulation on hold due to CVA, defer to NSICU    Advance Care Planning  Palliative Care Encounter  Met with medina Martínez at bedside along with SW to introduce palliative care service. Daughter states that youngest brother Donny is proxy, however no formal document is on file. [facility] that if no formal HCP is provided, all adult children would be equal surrogate decision makers. We further discussed hospital course thus far and daughter understands mother has suffered several strokes and that it is unlikely to result in meaningful neurological recovery. She states mother indicated to her and a few other siblings that she would not want to be maintained on life support via trach/vent or feeding tube if she could not return to the person she once was. Unfortunately mother did not complete any living will or MOLST indicating wishes on paper. There is a difficult family dynamica and It appears that not all siblings are not all in consensus with plan of care moving forward.  Emotional support provide and questions answered.     Writer later tried to reach son Donny along with Palliative ALAN Grayson, no answer and left  with contact number to return call.

## 2022-03-16 NOTE — CHART NOTE - NSCHARTNOTEFT_GEN_A_CORE
palliative care social work note.    SW attempted to contact patients son Irwin to discuss decision making further and coordination of care. case discussed with Dr Man and SW aware patient has 6 siblings and dgt who Dr Man spoke with acknowledged that youngest son Irwin is HCP. No official document on chart at this point. SW will follow with family further.

## 2022-03-16 NOTE — CONSULT NOTE ADULT - SUBJECTIVE AND OBJECTIVE BOX
[facility] PALLIATIVE MEDICINE CONSULT    CC: Patient is a 88y old  Female who presents with a chief complaint of stroke (15 Mar 2022 21:06)    HPI:  Pt unable to provide history, info from chart.     "89 yo F with PMH HTN, HLD, chronic LBBB, covid 3 weeks ago who presented to [facility] with unresponsiveness. Per ED physician who obtained history from patient's son, patient's last known well was 3/12 2200, walking and talking normally. Patient then found this am speaking normally around 0930 when had sudden ? syncopal episode. EMS called, found to be unresponsive and possible seizure-like activity in the field. Intubation attempted in field and unsuccessful, traumatic hemoptysis with estimated 250cc blood loss. Taken to [facility] where she was intubated there. Code stroke initiated, found to have L carotid occlusion. Was transferred to [facility] for mechanical thrombectomy. Unable to obtain ROS 2/2 intubation/sedation. NIH 37, mRS unknown present on admission"  (13 Mar 2022 16:02)    Pt seen and examined at bedside along with ALAN Manjarrez. Daughter Tanya present.       Present Symptoms:     Dyspnea: intubated  Nausea/Vomiting:  No  Anxiety:   No  Depression: unable  Fatigue: Yes    Loss of appetite: NPO  Constipation: unable    Pain: No            Character-            Duration-            Effect-            Factors-            Frequency-            Location-            Severity-    Pain AD Score:  [email or web address] (press ctrl + left click to view)    Review of Systems: Unable to obtain due to poor mentation     PERTINENT PMH REVIEWED: Yes      PAST MEDICAL & SURGICAL HISTORY:  HTN (hypertension)    HLD (hyperlipidemia)    History of left bundle branch block    2019 novel coronavirus disease (COVID-19): February 2022    FAMILY HISTORY:  Unable to obtain due to mental status.    Allergies    Allergy Status Unknown    Intolerances    hydrALAZINE (Other)      SOCIAL HISTORY:                      Substance history: unable to obtain due to mental status                    Admitted from:  home, son lives with her                    Children: 6 (5 sons and 1 daughter)                    Orthodoxy/spirituality:                    Cultural concerns:    DECISION MAKER(s):  [] Health Care Proxy(s)  [] Surrogate(s)  [] Guardian               ADVANCE DIRECTIVES/TREATMENT PREFERENCES:  DNR YES NO  Completed on:                     MOLST  YES NO   Completed on:  Living Will  YES NO   Completed on:    Baseline ADLs (prior to admission):  Independent/ Dependent      Karnofsky/Palliative Performance Status Version 2:  20%  [email or web address]_[email or web address]    MEDICATIONS  (STANDING):  aspirin  chewable 81 milliGRAM(s) Oral daily  atorvastatin 20 milliGRAM(s) Oral at bedtime  chlorhexidine 0.12% Liquid 15 milliLiter(s) Oral Mucosa every 12 hours  chlorhexidine 2% Cloths 1 Application(s) Topical daily  dextrose 40% Gel 15 Gram(s) Oral once  dextrose 5%. 1000 milliLiter(s) (50 mL/Hr) IV Continuous <Continuous>  dextrose 5%. 1000 milliLiter(s) (100 mL/Hr) IV Continuous <Continuous>  dextrose 50% Injectable 25 Gram(s) IV Push once  dextrose 50% Injectable 12.5 Gram(s) IV Push once  dextrose 50% Injectable 25 Gram(s) IV Push once  enoxaparin Injectable 40 milliGRAM(s) SubCutaneous every 24 hours  glucagon  Injectable 1 milliGRAM(s) IntraMuscular once  insulin lispro (ADMELOG) corrective regimen sliding scale   SubCutaneous every 6 hours  pantoprazole  Injectable 40 milliGRAM(s) IV Push daily  polyethylene glycol 3350 17 Gram(s) Oral daily  propofol Infusion 10 MICROgram(s)/kG/Min (4.8 mL/Hr) IV Continuous <Continuous>  senna 2 Tablet(s) Oral at bedtime    MEDICATIONS  (PRN):  acetaminophen    Suspension .. 650 milliGRAM(s) Enteral Tube every 6 hours PRN Temp greater or equal to 38C (100.4F), Mild Pain (1 - 3)  enalaprilat Injectable 1.25 milliGRAM(s) IV Push every 6 hours PRN SBP > 160  fentaNYL    Injectable 25 MICROGram(s) IV Push every 2 hours PRN Moderate Pain (4 - 6)  midazolam Injectable 2 milliGRAM(s) IV Push every 6 hours PRN Moderate pain      PHYSICAL EXAM:    Vital Signs Last 24 Hrs  T(C): 38 (16 Mar 2022 10:00), Max: 38.3 (15 Mar 2022 15:00)  T(F): 100.4 (16 Mar 2022 10:00), Max: 100.9 (15 Mar 2022 15:00)  HR: 78 (16 Mar 2022 10:00) (64 - 786)  BP: 142/56 (16 Mar 2022 10:00) (98/45 - 178/62)  BP(mean): 80 (16 Mar 2022 10:00) (60 - 99)  RR: 12 (16 Mar 2022 10:00) (11 - 22)  SpO2: 100% (16 Mar 2022 10:00) (98% - 100%)    General: resting comfortably and no acute distress.     HEENT: mucous membrane moist +ETT    Lungs: comfortable nonlabored      CV: S1/S2. Regular rate and rhythm    GI: +BS abdomen soft, nondistended, nontender      : bowers    MSK:  no cyanosis. + edema +bedbound    Neuro: nonfocal. nonverbal. lethargic.     Skin: warm and dry.      LABS:                        8.8    7.21  )-----------( 84       ( 16 Mar 2022 03:26 )             27.4     03-16    141  |  108<H>  |  13.1  ----------------------------<  113<H>  3.6   |  23.0  |  0.60    Ca    7.9<L>      16 Mar 2022 03:26  Phos  2.0     03-16  Mg     2.0     03-16      PT/INR - ( 14 Mar 2022 17:48 )   PT: 12.9 sec;   INR: 1.11 ratio         PTT - ( 14 Mar 2022 17:48 )  PTT:29.6 sec    I&O's Summary    15 Mar 2022 07:01  -  16 Mar 2022 07:00  --------------------------------------------------------  IN: 1301 mL / OUT: 1007 mL / NET: 294 mL    16 Mar 2022 07:01  -  16 Mar 2022 11:34  --------------------------------------------------------  IN: 404.8 mL / OUT: 90 mL / NET: 314.8 mL        RADIOLOGY & ADDITIONAL STUDIES:    [facility] 3/13/22    ACC: [id number] EXAM:  CT BRAIN                          PROCEDURE DATE:  03/13/2022          INTERPRETATION:  Clinical indication: Status post thrombectomy.    Multiple axial sections were performed from base skull to vertex without   contrast enhancement. Coronal and sagittal reconstructions were performed.    This exam is compared with prior head CT performed on March 13, 2022.    Abnormal low-attenuation involving the left temporal cortical and   subcortical region is seen which is compatible with an acute infarct.   Loss of the gray-white differentiation is seen is well. This is   compatible with an acute right PCA infarct.    Abnormal areas of low-attenuation involving the posterior medial right   temporal region. This is compatible with acute left MCA infarct.    There is evidence of vague area of high attenuation seen on left corona   radiata region. High attenuation also again seen involving the left basal   ganglia region. Associated with this is areas of hemorrhage or retained   contrast. MRI can be done to better characterize finding.    Evaluation of the osseous structures with the appropriate window   demonstrates hyperostosis frontalis interna    Left sphenoid sinus mucosal thickening is seen.    Both mastoid and middle ear regions appear clear.    IMPRESSION: Areas of acute infarct as described.    Vague areas of high attenuation involving the left basal ganglia and   corona radiata region.    --- End of Report ---    ZARA SAUCEDO MD; Attending Radiologist  This document has been electronically signed. Mar 14 2022  8:28AM    [facility] 3/13/22    ACC: [id number] EXAM:  XR CHEST PORTABLE IMMED 1V                          PROCEDURE DATE:  03/13/2022          INTERPRETATION:  Clinical history: 80-year-old female, stroke, NG tube   placement.    Portable view of the chest without comparison demonstrates an NG tube   with the tip in the stomach and ET tube with the tip 2.5 cm above the   tani.    Cardiac silhouette and pulmonary vasculature are within normal limits   with no consolidation, effusion, gross adenopathy, pneumothorax or acute   osseous finding.    IMPRESSION:  NG tube with the tip in the stomach.    ET tube tip 2.5 cm above the tani    --- End of Report ---    BRENDAN FARRELL DO; Attending Radiologist  This document has been electronically signed. Mar 14 2022 11:09AM      MRI Head 3/14/22    ACC: [id number] EXAM:  MR BRAIN                          PROCEDURE DATE:  03/14/2022      INTERPRETATION:  CLINICAL INDICATION: Stroke workup.    TECHNIQUE: Multi-planar multi-sequential MR imaging of the brain was   performed without intravenous contrast.    COMPARISON: CT head 3/14/2022.    FINDINGS:  Large acute infarct is noted involving the right posterior medial   temporal lobe and occipital lobe. Additional infarcts are noted involving   the bilateral cerebellum, left anteromedial temporal lobe, left basal   ganglia, left insula, and left precentral gyrus.    Ongoing evolution of hemorrhages in the left basal ganglia.    No hydrocephalus. No extra-axial fluid collections. The skull base flow   voids are present.    Bilateral cataract surgery. Orbits are otherwise unremarkable. Left   sphenoid sinus fluid is noted. Left mastoid effusion is noted. The   visualized osseous structures, soft tissues and partially visualized   parotid glands appear normal.    IMPRESSION:    -Multiple acute infarcts, largest involving the right temporal-occipital   lobe.    -Ongoing evolution of hemorrhage is in the left basal ganglia.    --- End of Report ---    CON MONTERO MD; Attending Radiologist  This document has been electronically signed. Mar 15 2022  8:40AM    [facility] 3/14/22    ACC: [id number] EXAM:  CT BRAIN                          PROCEDURE DATE:  03/14/2022          INTERPRETATION:  CT HEAD    CLINICAL HISTORY: f/u mechanical thrombectomy, possible hemorrhage    TECHNIQUE:  Noncontrast CT.  Axial Acquisition.  Sagittal and coronal reformations.    COMPARISON:  Exam is compared to prior study of 3/13/2022    FINDINGS:  Regions of increased density within the left basal ganglia are again   seen. Favor contrast enhancement. Likely associated acute ischemia.    Acute infarct within the right PCA territory is better seen. Inferior   left cerebellar ischemia again noted    No midline shift or hydrocephalus.    No subdural or epidural collection.    Fluid is present within the left sphenoid sinus. Soft tissue is present   within the right external auditory canal.    IMPRESSION:  *  REGIONS OF INCREASED DENSITY WITHIN THE LEFT BASAL GANGLIA AGAIN SEEN.   FAVOR CONTRAST ENHANCEMENT. LIKELY ASSOCIATED ACUTE ISCHEMIA.  *  ACUTE RIGHT PCA INFARCT BETTER SEEN.  *  INFERIOR LEFT CEREBELLAR ISCHEMIA.  *  NO MIDLINE SHIFT OR HYDROCEPHALUS    --- End of Report ---    JENNIFER OLSON MD; Attending Radiologist  This document has been electronically signed. Mar 14 2022  8:27AM    [facility] 3/14/22    ACC: [id number] EXAM:  US DPLX LWR EXT VEINS COMPL BI                          PROCEDURE DATE:  03/14/2022      INTERPRETATION:  CLINICAL INFORMATION: Hypercoagulable state.    COMPARISON: None available.    TECHNIQUE: Duplex sonography of the BILATERAL LOWER extremity veins with   color and spectral Doppler, with and without compression.    FINDINGS:    RIGHT:  Normal compressibility of the RIGHT common femoral, femoral and popliteal   veins.  Doppler examination shows normal spontaneous and phasic flow.  There is acute deep vein thrombosis involving the right posterior tibial   vein and peroneal veins.    LEFT:  Normal compressibility of the LEFT common femoral, femoral and popliteal   veins.  Doppler examination shows normal spontaneous and phasic flow.  There is acute deep vein thrombosis involving the left posterior tibial,   peroneal and tibial peroneal trunk.    IMPRESSION:  Bilateral calf acute deep vein thrombosis as described above.  Acute deep venous thrombosis: below the knee.    Findings discussed with Dr. Mcgill on  3/14/2022 8:14 PM with read back.    --- End of Report ---    LULU GALE MD; Attending Radiologist  This document has been electronically signed. Mar 14 2022  8:20PM  Abnormal.   [facility] PALLIATIVE MEDICINE CONSULT    CC: Patient is a 88y old  Female who presents with a chief complaint of stroke (15 Mar 2022 21:06)    HPI:  Pt unable to provide history, info from chart.     "87 yo F with PMH HTN, HLD, chronic LBBB, covid 3 weeks ago who presented to [facility] with unresponsiveness. Per ED physician who obtained history from patient's son, patient's last known well was 3/12 2200, walking and talking normally. Patient then found this am speaking normally around 0930 when had sudden ? syncopal episode. EMS called, found to be unresponsive and possible seizure-like activity in the field. Intubation attempted in field and unsuccessful, traumatic hemoptysis with estimated 250cc blood loss. Taken to [facility] where she was intubated there. Code stroke initiated, found to have L carotid occlusion. Was transferred to [facility] for mechanical thrombectomy. Unable to obtain ROS 2/2 intubation/sedation. NIH 37, mRS unknown present on admission"  (13 Mar 2022 16:02)    Pt seen and examined at bedside along with ALAN Manjarrez. Daughter Tanya present.       Present Symptoms:     Dyspnea: intubated  Nausea/Vomiting:  No  Anxiety:   No  Depression: unable  Fatigue: Yes    Loss of appetite: NPO  Constipation: unable    Pain: No            Character-            Duration-            Effect-            Factors-            Frequency-            Location-            Severity-    Pain AD Score:  [email or web address] (press ctrl + left click to view)    Review of Systems: Unable to obtain due to poor mentation     PERTINENT PMH REVIEWED: Yes      PAST MEDICAL & SURGICAL HISTORY:  HTN (hypertension)    HLD (hyperlipidemia)    History of left bundle branch block    2019 novel coronavirus disease (COVID-19): February 2022    FAMILY HISTORY:  Unable to obtain due to mental status.    Allergies    Allergy Status Unknown    Intolerances    hydrALAZINE (Other)      SOCIAL HISTORY:                      Substance history: unable to obtain due to mental status                    Admitted from:  home, son lives with her                    Children: 6 (5 sons and 1 daughter)                    Yazdanism/spirituality:                    Cultural concerns:    DECISION MAKER(s):  [] Health Care Proxy(s)  [] Surrogate(s)  [] Guardian               ADVANCE DIRECTIVES/TREATMENT PREFERENCES:  DNR YES NO  Completed on:                     MOLST  YES NO   Completed on:  Living Will  YES NO   Completed on:    Baseline ADLs (prior to admission):  Independent/ Dependent      Karnofsky/Palliative Performance Status Version 2:  20%  [email or web address]_performance_[email or web address]_[email or web address].pdf    MEDICATIONS  (STANDING):  aspirin  chewable 81 milliGRAM(s) Oral daily  atorvastatin 20 milliGRAM(s) Oral at bedtime  chlorhexidine 0.12% Liquid 15 milliLiter(s) Oral Mucosa every 12 hours  chlorhexidine 2% Cloths 1 Application(s) Topical daily  dextrose 40% Gel 15 Gram(s) Oral once  dextrose 5%. 1000 milliLiter(s) (50 mL/Hr) IV Continuous <Continuous>  dextrose 5%. 1000 milliLiter(s) (100 mL/Hr) IV Continuous <Continuous>  dextrose 50% Injectable 25 Gram(s) IV Push once  dextrose 50% Injectable 12.5 Gram(s) IV Push once  dextrose 50% Injectable 25 Gram(s) IV Push once  enoxaparin Injectable 40 milliGRAM(s) SubCutaneous every 24 hours  glucagon  Injectable 1 milliGRAM(s) IntraMuscular once  insulin lispro (ADMELOG) corrective regimen sliding scale   SubCutaneous every 6 hours  pantoprazole  Injectable 40 milliGRAM(s) IV Push daily  polyethylene glycol 3350 17 Gram(s) Oral daily  propofol Infusion 10 MICROgram(s)/kG/Min (4.8 mL/Hr) IV Continuous <Continuous>  senna 2 Tablet(s) Oral at bedtime    MEDICATIONS  (PRN):  acetaminophen    Suspension .. 650 milliGRAM(s) Enteral Tube every 6 hours PRN Temp greater or equal to 38C (100.4F), Mild Pain (1 - 3)  enalaprilat Injectable 1.25 milliGRAM(s) IV Push every 6 hours PRN SBP > 160  fentaNYL    Injectable 25 MICROGram(s) IV Push every 2 hours PRN Moderate Pain (4 - 6)  midazolam Injectable 2 milliGRAM(s) IV Push every 6 hours PRN Moderate pain      PHYSICAL EXAM:    Vital Signs Last 24 Hrs  T(C): 38 (16 Mar 2022 10:00), Max: 38.3 (15 Mar 2022 15:00)  T(F): 100.4 (16 Mar 2022 10:00), Max: 100.9 (15 Mar 2022 15:00)  HR: 78 (16 Mar 2022 10:00) (64 - 786)  BP: 142/56 (16 Mar 2022 10:00) (98/45 - 178/62)  BP(mean): 80 (16 Mar 2022 10:00) (60 - 99)  RR: 12 (16 Mar 2022 10:00) (11 - 22)  SpO2: 100% (16 Mar 2022 10:00) (98% - 100%)    General: resting comfortably and no acute distress.     HEENT: mucous membrane moist +ETT    Lungs: comfortable nonlabored      CV: S1/S2. Regular rate and rhythm    GI: +BS abdomen soft, nondistended, nontender      : bowers    MSK:  no cyanosis. + edema +bedbound    Neuro: nonfocal. nonverbal. lethargic.     Skin: warm and dry.      LABS:                        8.8    7.21  )-----------( 84       ( 16 Mar 2022 03:26 )             27.4     03-16    141  |  108<H>  |  13.1  ----------------------------<  113<H>  3.6   |  23.0  |  0.60    Ca    7.9<L>      16 Mar 2022 03:26  Phos  2.0     03-16  Mg     2.0     03-16      PT/INR - ( 14 Mar 2022 17:48 )   PT: 12.9 sec;   INR: 1.11 ratio         PTT - ( 14 Mar 2022 17:48 )  PTT:29.6 sec    I&O's Summary    15 Mar 2022 07:01  -  16 Mar 2022 07:00  --------------------------------------------------------  IN: 1301 mL / OUT: 1007 mL / NET: 294 mL    16 Mar 2022 07:01  -  16 Mar 2022 11:34  --------------------------------------------------------  IN: 404.8 mL / OUT: 90 mL / NET: 314.8 mL        RADIOLOGY & ADDITIONAL STUDIES:    [facility] 3/13/22    ACC: [id number] EXAM:  CT BRAIN                          PROCEDURE DATE:  03/13/2022          INTERPRETATION:  Clinical indication: Status post thrombectomy.    Multiple axial sections were performed from base skull to vertex without   contrast enhancement. Coronal and sagittal reconstructions were performed.    This exam is compared with prior head CT performed on March 13, 2022.    Abnormal low-attenuation involving the left temporal cortical and   subcortical region is seen which is compatible with an acute infarct.   Loss of the gray-white differentiation is seen is well. This is   compatible with an acute right PCA infarct.    Abnormal areas of low-attenuation involving the posterior medial right   temporal region. This is compatible with acute left MCA infarct.    There is evidence of vague area of high attenuation seen on left corona   radiata region. High attenuation also again seen involving the left basal   ganglia region. Associated with this is areas of hemorrhage or retained   contrast. MRI can be done to better characterize finding.    Evaluation of the osseous structures with the appropriate window   demonstrates hyperostosis frontalis interna    Left sphenoid sinus mucosal thickening is seen.    Both mastoid and middle ear regions appear clear.    IMPRESSION: Areas of acute infarct as described.    Vague areas of high attenuation involving the left basal ganglia and   corona radiata region.    --- End of Report ---    ZARA SAUCEDO MD; Attending Radiologist  This document has been electronically signed. Mar 14 2022  8:28AM    [facility] 3/13/22    ACC: [id number] EXAM:  XR CHEST PORTABLE IMMED 1V                          PROCEDURE DATE:  03/13/2022          INTERPRETATION:  Clinical history: 80-year-old female, stroke, NG tube   placement.    Portable view of the chest without comparison demonstrates an NG tube   with the tip in the stomach and ET tube with the tip 2.5 cm above the   tani.    Cardiac silhouette and pulmonary vasculature are within normal limits   with no consolidation, effusion, gross adenopathy, pneumothorax or acute   osseous finding.    IMPRESSION:  NG tube with the tip in the stomach.    ET tube tip 2.5 cm above the tani    --- End of Report ---    BRENDAN FARRELL DO; Attending Radiologist  This document has been electronically signed. Mar 14 2022 11:09AM      MRI Head 3/14/22    ACC: [id number] EXAM:  MR BRAIN                          PROCEDURE DATE:  03/14/2022      INTERPRETATION:  CLINICAL INDICATION: Stroke workup.    TECHNIQUE: Multi-planar multi-sequential MR imaging of the brain was   performed without intravenous contrast.    COMPARISON: CT head 3/14/2022.    FINDINGS:  Large acute infarct is noted involving the right posterior medial   temporal lobe and occipital lobe. Additional infarcts are noted involving   the bilateral cerebellum, left anteromedial temporal lobe, left basal   ganglia, left insula, and left precentral gyrus.    Ongoing evolution of hemorrhages in the left basal ganglia.    No hydrocephalus. No extra-axial fluid collections. The skull base flow   voids are present.    Bilateral cataract surgery. Orbits are otherwise unremarkable. Left   sphenoid sinus fluid is noted. Left mastoid effusion is noted. The   visualized osseous structures, soft tissues and partially visualized   parotid glands appear normal.    IMPRESSION:    -Multiple acute infarcts, largest involving the right temporal-occipital   lobe.    -Ongoing evolution of hemorrhage is in the left basal ganglia.    --- End of Report ---    CON MONTERO MD; Attending Radiologist  This document has been electronically signed. Mar 15 2022  8:40AM    [facility] 3/14/22    ACC: [id number] EXAM:  CT BRAIN                          PROCEDURE DATE:  03/14/2022      INTERPRETATION:  CT HEAD    CLINICAL HISTORY: f/u mechanical thrombectomy, possible hemorrhage    TECHNIQUE:  Noncontrast CT.  Axial Acquisition.  Sagittal and coronal reformations.    COMPARISON:  Exam is compared to prior study of 3/13/2022    FINDINGS:  Regions of increased density within the left basal ganglia are again   seen. Favor contrast enhancement. Likely associated acute ischemia.    Acute infarct within the right PCA territory is better seen. Inferior   left cerebellar ischemia again noted    No midline shift or hydrocephalus.    No subdural or epidural collection.    Fluid is present within the left sphenoid sinus. Soft tissue is present   within the right external auditory canal.    IMPRESSION:  *  REGIONS OF INCREASED DENSITY WITHIN THE LEFT BASAL GANGLIA AGAIN SEEN.   FAVOR CONTRAST ENHANCEMENT. LIKELY ASSOCIATED ACUTE ISCHEMIA.  *  ACUTE RIGHT PCA INFARCT BETTER SEEN.  *  INFERIOR LEFT CEREBELLAR ISCHEMIA.  *  NO MIDLINE SHIFT OR HYDROCEPHALUS    --- End of Report ---    JENNIFER OLSON MD; Attending Radiologist  This document has been electronically signed. Mar 14 2022  8:27AM    [facility] Doppler 3/14/22    ACC: [id number] EXAM:  US DPLX LWR EXT VEINS COMPL BI                          PROCEDURE DATE:  03/14/2022      INTERPRETATION:  CLINICAL INFORMATION: Hypercoagulable state.    COMPARISON: None available.    TECHNIQUE: Duplex sonography of the BILATERAL LOWER extremity veins with   color and spectral Doppler, with and without compression.    FINDINGS:    RIGHT:  Normal compressibility of the RIGHT common femoral, femoral and popliteal   veins.  Doppler examination shows normal spontaneous and phasic flow.  There is acute deep vein thrombosis involving the right posterior tibial   vein and peroneal veins.    LEFT:  Normal compressibility of the LEFT common femoral, femoral and popliteal   veins.  Doppler examination shows normal spontaneous and phasic flow.  There is acute deep vein thrombosis involving the left posterior tibial,   peroneal and tibial peroneal trunk.    IMPRESSION:  Bilateral calf acute deep vein thrombosis as described above.  Acute deep venous thrombosis: below the knee.    Findings discussed with Dr. Mcgill on  3/14/2022 8:14 PM with read back.    --- End of Report ---    LULU GALE MD; Attending Radiologist  This document has been electronically signed. Mar 14 2022  8:20PM  Abnormal.      [facility] 3/14/22  Summary:   1. Left ventricular ejection fraction, by visual estimation, is 65 to 70%.   2. Normal global left ventricular systolic function.   3. The mitral in-flow pattern reveals no discernable A-wave, therefore   no comment on diastolic function can be made.   4. There is mild concentric left ventricular hypertrophy.   5. Normal right ventricular size and function, estimated RVSP least 29 mmHg.   6. Mildly enlarged left atrium.   7. Thickening of the anterior and posterior mitral valve leaflets.   8. Trace mitral valve regurgitation.   9. Mild tricuspid regurgitation.  10. Linear echogenicity presence below the aortic annulus cannot exclude   a subaortic membrane. No outflow tract obstruction.  11. Mild aortic regurgitation.  12. Mild pulmonic valve regurgitation.  13. Large patent foramen ovale, with bidirectional shunting across atrial   septum. Estimated left atrial pressure of 12 mmHg.  14. Mobile intra-atrial septum.  15. No left atrial appendage thrombus, prominent left atrial appendage   and normal left atrial appendage velocities.  16. There is mild aortic root calcification.  17. Severe complex atheroma at the proximal aortic arch, up to 1 cm   protrusion in to the lumen.  18. No prior THERESA study is available for comparison. Sources of   cardiogenic emboli present including underlying Afib rhythm, PFO with   right-to-left shunting and severe aortic arch atheroma. Findings   discussed with NSICU team.    Gentry Mast DO Electronically signed on 3/14/2022 at 5:34:42 PM

## 2022-03-16 NOTE — CONSULT NOTE ADULT - TIME BILLING
D/W daughter, RN, unit , NSICU Dr. Jason and NONA Bucio, Palliative  Dena  Chart review, meds, labs, imaging

## 2022-03-16 NOTE — PROGRESS NOTE ADULT - ASSESSMENT
89 yo F with PMH HTN, HLD, chronic LBBB, COVID 3 weeks ago who presented to [facility] with syncope described as sudden loss of consciousness with possible seizure-like activity at 0930 3/13/22.  Intubation attempted in field and unsuccessful, with traumatic hemoptysis (EBL 250cc). She was taken to [facility] where she was intubated and Code Stroke initiated. CT demonstrated left carotid occlusion and patient was transferred to [facility] for mechanical thrombectomy. Post operatively, pt has thus far remained unresponsive and repeat CT demonstrates likely acute ischemia of the left basal ganglia, and acute infarct of the right PCA and inferior left cerebellum. Pt was subsequently noted to have paroxysmal AF with RVR to 130s bpm, as well as periods of transient complete heart block in sinus rhythm. EP is consulted for AF with RVR and high grade AV block.    Recommendations:   1. Complete heart block  - Baseline LBBB (reportedly chronic), left axis deviation, 1st degree AVB   - up to~ 6 seconds due to CHB with sinus rhythm at 90sbpm noted previously  - occasional blocked PACs, but otherwise no significant pauses or bradycardia overnight   - If pt is likely to make meaningful neurologic recover, she will need pacing support long term  - Leadless pacemaker would be most appropriate for this pt. This would require post op bedrest w/ leg immobilized x 6 hours.   - If meaningful recovery is expected, would prefer to perform pacemaker while pt is intubated & keep intubated for post op recovery period.   - Would AVOID beta blockade or other AV jd blocking agents for now.   - Maintain AED patches secured in place w/ pacing-capable AED immediately available in case of prolonged pauses.   - Would consider temporary pacing wire if significant recurrent pauses.      2. Paroxysmal AF w/ RVR  - as above, would AVOID beta blockade or other AV jd blocking agents for now.  - Ok to tolerate HRs up to 150s bpm without intervention for now.   - CHADS-VASC = 6 (age x 2, female, HTN, CVA) - score corrected to account for CVA. Now w/ possible hemorrhagic conversion. If meaningful recovery, will need to discuss R/B of anticoagulation.     3. HTN   - management as per general cardiology   - No contraindication to hydralazine if needed from EP perspective. Although hydralazine was given 20-30 minutes prior to 2 episodes of CHB, pt has clear baseline conduction disease and other than temporal correlation, there is no reason to suspect hydralazine caused the progression to CHB.     Will d/w Dr. Hartmann; further recs to follow.  89 yo F with PMH HTN, HLD, chronic LBBB, COVID 3 weeks ago who presented to [facility] with syncope described as sudden loss of consciousness with possible seizure-like activity at 0930 3/13/22.  Intubation attempted in field and unsuccessful, with traumatic hemoptysis (EBL 250cc). She was taken to [facility] where she was intubated and Code Stroke initiated. CT demonstrated left carotid occlusion and patient was transferred to [facility] for mechanical thrombectomy. Post operatively, pt has thus far remained unresponsive and repeat CT demonstrates likely acute ischemia of the left basal ganglia, and acute infarct of the right PCA and inferior left cerebellum. Pt was subsequently noted to have paroxysmal AF with RVR to 130s bpm, as well as periods of transient complete heart block in sinus rhythm. EP is consulted for AF with RVR and high grade AV block.    Recommendations:   1. Complete heart block  - Baseline LBBB (reportedly chronic), left axis deviation, 1st degree AVB   - up to~ 6 seconds due to CHB with sinus rhythm at 90sbpm noted previously  - occasional blocked PACs, but otherwise no significant pauses or bradycardia overnight   - If pt is likely to make meaningful neurologic recover, she will need pacing support long term  - Leadless pacemaker would be most appropriate for this pt. This would require post op bedrest w/ leg immobilized x 6 hours.   - If meaningful recovery is expected, would prefer to perform pacemaker while pt is intubated & keep intubated for post op recovery period.   - Would AVOID beta blockade or other AV jd blocking agents for now.   - Maintain AED patches secured in place w/ pacing-capable AED immediately available in case of prolonged pauses.   - Would consider temporary pacing wire if significant recurrent pauses.      2. Paroxysmal AF w/ RVR  - as above, would AVOID beta blockade or other AV jd blocking agents for now.  - Ok to tolerate HRs up to 150s bpm without intervention for now.   - CHADS-VASC = 6 (age x 2, female, HTN, CVA) - score corrected to account for CVA. Now w/ possible hemorrhagic conversion. If meaningful recovery, will need to discuss R/B of anticoagulation.     3. HTN   - management as per general cardiology   - From EP perspective, there is no contraindication to using hydralazine if needed for BP management. Although hydralazine was given 20-30 minutes prior to 2 episodes of CHB, pt has clear baseline conduction disease and other than temporal correlation, there is no reason to suspect hydralazine caused the progression to CHB.     Will d/w Dr. Hartmann; further recs to follow.

## 2022-03-16 NOTE — PROGRESS NOTE ADULT - ASSESSMENT
88F with CVA presents with L carotid occlusion s/p POD 2 MT TICI 3  revascularization likely embolic etiology versus hypercoagulable - Afib versus recent covid infection. Course c/b acute respiratory failure requiring intubation and mechanical ventilation. MRI reveals large acute infarct is noted involving the right PCA territory. Additional infarcts are noted involving the bilateral cerebellum, left anteromedial temporal lobe, left basal ganglia, left insula, and left precentral gyrus. Hemorrhagic conversion in left basal ganglia.    PLAN:  -D/W [facility] attending Dr. Piña  -No further endovascular interventions required at this time  -Continue neuro checks q2 hours, PRN propfol gtt/ PRN fentanyl pushes for sedation  -CT CH/AB/PLV for malignancy work up when stable  -Okay for aspirin if PLT stable  -Continue to hold AC for heme transformation  -SBP goal 100-160 PRN: enalapril. EP following. Avoid AV jd blocking agents  - A1C 6.4,  continue statin  -Ongoing GOC discussion with family and NSICU team  -Further care per NSICU team.  -We will continue to follow

## 2022-03-16 NOTE — GOALS OF CARE CONVERSATION - ADVANCED CARE PLANNING - CONVERSATION DETAILS
Family meeting held today at 1PM with sons Irwin (HCP) and Raheem, daughter Tanya, son Michel on phone and nephew (alternate proxy) Danny on phone.     Irwin states that there is a HCP form at [facility] which he needs to obtain, which designates primary proxy to him and alternate to nephew Danny. Advised to bring a copy for our records otherwise per [facility], all siblings are equal decision makers if form can't be produced.     Reviewed hospital course thus far, prognosis, treatment and plan of care moving forward. Family aware of extensive CVAs and that overall prognosis for meaningful neurological recovery is very poor. Raheem and Tanya state that mother voiced to several family members that she would not want to live this way on life support with trach/peg in a nursing home. Concurrently we also reviewed that aside from CVAs, mother has bilateral DVTs, PFO, significant atheroma, afib all of which will continue to predispose mother to future CVAs and VTEs, unable to treat with anticoagulation currently due to high risk of bleeding.     We encouraged family to consider what the patient would have wanted if she was able to speak right now and what she would deem reasonable quality of life.     Daughter inquired about what would be done if they opted to go the comfort route of not prolonging her on life support. Comfort measures explained in detail with shifting focus to aggressive symptom directed care with goal of preserving quality of life in the time remaining.     Irwin was mostly quite during conversation, did not have much questions and acknowledged understanding of the gravity of situation. He expressed appreciation for the information and that he needs to speak with nephew before making a final decision.     Emotional support provided and all questions answered.

## 2022-03-16 NOTE — PROGRESS NOTE ADULT - ASSESSMENT
89 yo F with CVA due to L carotid occlusion, s/p TICI 3 MT. Likely of embolic etiology.  Multiple BL strokes on MRI. Poor neuro exam.  Acute resp failure due to CVA; traumatic intubation in the field.  Large PFO, aortic ateroma.  Afib, newly diagnosed; episodes of cardiac pauses.  PMH HTN, HLD, chronic LBBB, covid 3 weeks ago.  Febrile.    [facility]  [facility]  hold therapeutic AC in view of large area of stroke and hemorrhagic conversion areas  maintain -160, BP control with Enalaprilat IV   sedation with Propofol ggt and PRN Fentanyl IVP  vent support, CPAp as tolerated  pads on - may need transcutaneous pacing; no BB or other AV blockers; EP involved  start TF, BM regimen  fever w/up  ongoing GOC discussion in view of significant brain injury and comorbidities, held family meeting today, Palliative involvement highly appreciated; will continue further communication    Pt is critically ill and at high risk of rapid deterioration/death due to abovementioned conditions.   Still requires critical care interventions - ongoing frequent evaluations, interventions and management adjustment by the Attending and ICU team, - and included review of relevant history, clinical examination, review of data and images, discussion of treatment with the multidisciplinary team and any consultants involved in this patient’s care as well as family discussion.     89 yo F with CVA due to L carotid occlusion, s/p TICI 3 MT. Likely of embolic etiology.  Multiple BL strokes on MRI. Poor neuro exam.  Acute resp failure due to CVA; traumatic intubation in the field.  Large PFO, aortic ateroma.  Afib, newly diagnosed; episodes of cardiac pauses.  PMH HTN, HLD, chronic LBBB, covid 3 weeks ago.  Febrile.  Thrombocytopenia, r/o HIT.    [facility]  [facility]  statin  hold therapeutic AC in view of large area of stroke and hemorrhagic conversion areas  maintain -160, BP control with Enalaprilat IV   sedation with Propofol ggt and PRN Fentanyl IVP  vent support, CPAp as tolerated  pads on - may need transcutaneous pacing; no BB or other AV blockers; EP involved  start TF, BM regimen  sent HIT, cannot AC as above, d/c ppx SQL  fever w/up  ongoing GOC discussion in view of significant brain injury and comorbidities, held family meeting today, Palliative involvement highly appreciated; will continue further communication    Pt is critically ill and at high risk of rapid deterioration/death due to abovementioned conditions.   Still requires critical care interventions - ongoing frequent evaluations, interventions and management adjustment by the Attending and ICU team, - and included review of relevant history, clinical examination, review of data and images, discussion of treatment with the multidisciplinary team and any consultants involved in this patient’s care as well as family discussion.

## 2022-03-17 VITALS — HEART RATE: 110 BPM | OXYGEN SATURATION: 81 %

## 2022-03-17 LAB
GRAM STN FLD: SIGNIFICANT CHANGE UP
HEPARIN-PF4 AB RESULT: <0.6 U/ML — SIGNIFICANT CHANGE UP (ref 0–0.9)
PF4 HEPARIN CMPLX AB SER-ACNC: NEGATIVE — SIGNIFICANT CHANGE UP
SPECIMEN SOURCE: SIGNIFICANT CHANGE UP

## 2022-03-17 PROCEDURE — 84443 ASSAY THYROID STIM HORMONE: CPT

## 2022-03-17 PROCEDURE — 85379 FIBRIN DEGRADATION QUANT: CPT

## 2022-03-17 PROCEDURE — 70547 MR ANGIOGRAPHY NECK W/O DYE: CPT

## 2022-03-17 PROCEDURE — 99233 SBSQ HOSP IP/OBS HIGH 50: CPT

## 2022-03-17 PROCEDURE — 87641 MR-STAPH DNA AMP PROBE: CPT

## 2022-03-17 PROCEDURE — 93325 DOPPLER ECHO COLOR FLOW MAPG: CPT

## 2022-03-17 PROCEDURE — 87640 STAPH A DNA AMP PROBE: CPT

## 2022-03-17 PROCEDURE — 86140 C-REACTIVE PROTEIN: CPT

## 2022-03-17 PROCEDURE — 99232 SBSQ HOSP IP/OBS MODERATE 35: CPT

## 2022-03-17 PROCEDURE — C1887: CPT

## 2022-03-17 PROCEDURE — 84100 ASSAY OF PHOSPHORUS: CPT

## 2022-03-17 PROCEDURE — 85652 RBC SED RATE AUTOMATED: CPT

## 2022-03-17 PROCEDURE — 93005 ELECTROCARDIOGRAM TRACING: CPT

## 2022-03-17 PROCEDURE — 81001 URINALYSIS AUTO W/SCOPE: CPT

## 2022-03-17 PROCEDURE — 83036 HEMOGLOBIN GLYCOSYLATED A1C: CPT

## 2022-03-17 PROCEDURE — 71045 X-RAY EXAM CHEST 1 VIEW: CPT | Mod: 26

## 2022-03-17 PROCEDURE — 86022 PLATELET ANTIBODIES: CPT

## 2022-03-17 PROCEDURE — 85730 THROMBOPLASTIN TIME PARTIAL: CPT

## 2022-03-17 PROCEDURE — 82550 ASSAY OF CK (CPK): CPT

## 2022-03-17 PROCEDURE — 93970 EXTREMITY STUDY: CPT

## 2022-03-17 PROCEDURE — 86850 RBC ANTIBODY SCREEN: CPT

## 2022-03-17 PROCEDURE — 82962 GLUCOSE BLOOD TEST: CPT

## 2022-03-17 PROCEDURE — C8929: CPT

## 2022-03-17 PROCEDURE — 80061 LIPID PANEL: CPT

## 2022-03-17 PROCEDURE — 36415 COLL VENOUS BLD VENIPUNCTURE: CPT

## 2022-03-17 PROCEDURE — 82803 BLOOD GASES ANY COMBINATION: CPT

## 2022-03-17 PROCEDURE — 93320 DOPPLER ECHO COMPLETE: CPT

## 2022-03-17 PROCEDURE — 70551 MRI BRAIN STEM W/O DYE: CPT

## 2022-03-17 PROCEDURE — 94003 VENT MGMT INPAT SUBQ DAY: CPT

## 2022-03-17 PROCEDURE — 83735 ASSAY OF MAGNESIUM: CPT

## 2022-03-17 PROCEDURE — 84484 ASSAY OF TROPONIN QUANT: CPT

## 2022-03-17 PROCEDURE — 80048 BASIC METABOLIC PNL TOTAL CA: CPT

## 2022-03-17 PROCEDURE — 70544 MR ANGIOGRAPHY HEAD W/O DYE: CPT

## 2022-03-17 PROCEDURE — 87070 CULTURE OTHR SPECIMN AEROBIC: CPT

## 2022-03-17 PROCEDURE — 87077 CULTURE AEROBIC IDENTIFY: CPT

## 2022-03-17 PROCEDURE — 85384 FIBRINOGEN ACTIVITY: CPT

## 2022-03-17 PROCEDURE — 85610 PROTHROMBIN TIME: CPT

## 2022-03-17 PROCEDURE — 86900 BLOOD TYPING SEROLOGIC ABO: CPT

## 2022-03-17 PROCEDURE — C1760: CPT

## 2022-03-17 PROCEDURE — 93312 ECHO TRANSESOPHAGEAL: CPT

## 2022-03-17 PROCEDURE — 87040 BLOOD CULTURE FOR BACTERIA: CPT

## 2022-03-17 PROCEDURE — C1769: CPT

## 2022-03-17 PROCEDURE — 71045 X-RAY EXAM CHEST 1 VIEW: CPT

## 2022-03-17 PROCEDURE — 85049 AUTOMATED PLATELET COUNT: CPT

## 2022-03-17 PROCEDURE — 76380 CAT SCAN FOLLOW-UP STUDY: CPT

## 2022-03-17 PROCEDURE — 84145 PROCALCITONIN (PCT): CPT

## 2022-03-17 PROCEDURE — C1894: CPT

## 2022-03-17 PROCEDURE — 85027 COMPLETE CBC AUTOMATED: CPT

## 2022-03-17 PROCEDURE — 86901 BLOOD TYPING SEROLOGIC RH(D): CPT

## 2022-03-17 PROCEDURE — 70450 CT HEAD/BRAIN W/O DYE: CPT

## 2022-03-17 PROCEDURE — 99497 ADVNCD CARE PLAN 30 MIN: CPT | Mod: 25

## 2022-03-17 PROCEDURE — 94002 VENT MGMT INPAT INIT DAY: CPT

## 2022-03-17 PROCEDURE — 61645 PERQ ART M-THROMBECT &/NFS: CPT

## 2022-03-17 RX ORDER — HYDROMORPHONE HYDROCHLORIDE 2 MG/ML
2 INJECTION INTRAMUSCULAR; INTRAVENOUS; SUBCUTANEOUS
Refills: 0 | Status: DISCONTINUED | OUTPATIENT
Start: 2022-03-17 | End: 2022-03-17

## 2022-03-17 RX ORDER — MORPHINE SULFATE 50 MG/1
2 CAPSULE, EXTENDED RELEASE ORAL
Refills: 0 | Status: DISCONTINUED | OUTPATIENT
Start: 2022-03-17 | End: 2022-03-17

## 2022-03-17 RX ORDER — MORPHINE SULFATE 50 MG/1
2 CAPSULE, EXTENDED RELEASE ORAL
Qty: 100 | Refills: 0 | Status: DISCONTINUED | OUTPATIENT
Start: 2022-03-17 | End: 2022-03-17

## 2022-03-17 RX ORDER — MORPHINE SULFATE 50 MG/1
4 CAPSULE, EXTENDED RELEASE ORAL
Refills: 0 | Status: DISCONTINUED | OUTPATIENT
Start: 2022-03-17 | End: 2022-03-17

## 2022-03-17 RX ORDER — ROBINUL 0.2 MG/ML
0.4 INJECTION INTRAMUSCULAR; INTRAVENOUS EVERY 6 HOURS
Refills: 0 | Status: DISCONTINUED | OUTPATIENT
Start: 2022-03-17 | End: 2022-03-17

## 2022-03-17 RX ADMIN — Medication 2 MILLIGRAM(S): at 16:28

## 2022-03-17 RX ADMIN — Medication 2 MILLIGRAM(S): at 15:31

## 2022-03-17 RX ADMIN — MORPHINE SULFATE 2 MG/HR: 50 CAPSULE, EXTENDED RELEASE ORAL at 14:53

## 2022-03-17 RX ADMIN — Medication 1 MILLIGRAM(S): at 15:57

## 2022-03-17 RX ADMIN — HYDROMORPHONE HYDROCHLORIDE 2 MILLIGRAM(S): 2 INJECTION INTRAMUSCULAR; INTRAVENOUS; SUBCUTANEOUS at 17:14

## 2022-03-17 RX ADMIN — Medication 81 MILLIGRAM(S): at 11:17

## 2022-03-17 RX ADMIN — CHLORHEXIDINE GLUCONATE 1 APPLICATION(S): 213 SOLUTION TOPICAL at 11:16

## 2022-03-17 RX ADMIN — Medication 650 MILLIGRAM(S): at 11:52

## 2022-03-17 RX ADMIN — Medication 2 MILLIGRAM(S): at 17:26

## 2022-03-17 RX ADMIN — HYDROMORPHONE HYDROCHLORIDE 2 MILLIGRAM(S): 2 INJECTION INTRAMUSCULAR; INTRAVENOUS; SUBCUTANEOUS at 16:44

## 2022-03-17 RX ADMIN — PANTOPRAZOLE SODIUM 40 MILLIGRAM(S): 20 TABLET, DELAYED RELEASE ORAL at 11:18

## 2022-03-17 RX ADMIN — CHLORHEXIDINE GLUCONATE 15 MILLILITER(S): 213 SOLUTION TOPICAL at 06:45

## 2022-03-17 RX ADMIN — HYDROMORPHONE HYDROCHLORIDE 2 MILLIGRAM(S): 2 INJECTION INTRAMUSCULAR; INTRAVENOUS; SUBCUTANEOUS at 17:56

## 2022-03-17 RX ADMIN — HYDROMORPHONE HYDROCHLORIDE 2 MILLIGRAM(S): 2 INJECTION INTRAMUSCULAR; INTRAVENOUS; SUBCUTANEOUS at 17:26

## 2022-03-17 RX ADMIN — ROBINUL 0.4 MILLIGRAM(S): 0.2 INJECTION INTRAMUSCULAR; INTRAVENOUS at 17:25

## 2022-03-17 RX ADMIN — Medication 650 MILLIGRAM(S): at 11:22

## 2022-03-17 NOTE — DISCHARGE NOTE FOR THE EXPIRED PATIENT - HOSPITAL COURSE
89yo F with hx of HTN, HLD, chronic LBBB, recent covid (2022) transferred from [facility] after found to have Lt ICA occlusion s/p emergent mechanical thrombectomy complicated by rapid Afib, complete heart block and subsequent MRI with multiple bilateral embolic infarcts, BLE DVTs and overall poor prognosis. Ultimately family opted for DNR with no escalation of care. Decision was made to compassionately extubate patient today at 15:36.  Comfort medications were ordered. Patient  peacefully a short time later.     Physical Exam  No response to verbal or physical stimuli  Absent heart sounds  No spontaneous respiration  Absent peripheral pulses  Pupils are fixed and dilated bilaterally    Offered emotional support to family members present at bedside.     O

## 2022-03-17 NOTE — PROGRESS NOTE ADULT - ASSESSMENT
89 yo F with CVA due to L carotid occlusion, s/p TICI 3 MT. Likely of embolic etiology.  Multiple BL strokes on MRI. Poor neuro exam.  Acute resp failure due to CVA; traumatic intubation in the field.  Large PFO, aortic ateroma.  Afib, newly diagnosed; episodes of cardiac pauses.  PMH HTN, HLD, chronic LBBB, covid 3 weeks ago.  Febrile.  Thrombocytopenia.  CMO.    Plan:  CMO - Morphine ggt, Ativan and Dilaudid PRN  family at the bedside  Palliative involvement highly appreciated

## 2022-03-17 NOTE — PROGRESS NOTE ADULT - SUBJECTIVE AND OBJECTIVE BOX
HPI:  87 yo F with PMH HTN, HLD, chronic LBBB, covid 3 weeks ago who presented to [facility] with unresponsiveness. Per ED physician who obtained history from patient's son, patient's last known well was 3/12 2200, walking and talking normally. Patient then found this am speaking normally around 0930 when had sudden ? syncopal episode. EMS called, found to be unresponsive and possible seizure-like activity in the field. Intubation attempted in field and unsuccessful, traumatic hemoptysis with estimated 250cc blood loss. Taken to [facility] where she was intubated there. Code stroke initiated, found to have L carotid occlusion. Was transferred to [facility] for mechanical thrombectomy. Unable to obtain ROS 2/2 intubation/sedation.  NIH 37, mRS unknown present on admission  (13 Mar 2022 16:02)  Underwent TICI 3 MT.    OVERNIGHT EVENTS: No acute events overnight.  VS, labs, imagining reviewed.    EXAMINATION:   General:  calm, NAD.  Neuro:  no EO, comatose, no FC, PERRL 2mm, L gaze, moves L side, R side - minimal wdrl.  Cards:  irregular rhythm noted again  Respiratory:  no respiratory distress, intubated  Abdomen:  soft  Extremities:  no edema  Skin:  warm/dry
HPI:  89 yo F with PMH HTN, HLD, chronic LBBB, covid 3 weeks ago who presented to [facility] with unresponsiveness. Per ED physician who obtained history from patient's son, patient's last known well was 3/12 2200, walking and talking normally. Patient then found this am speaking normally around 0930 when had sudden ? syncopal episode. EMS called, found to be unresponsive and possible seizure-like activity in the field. Intubation attempted in field and unsuccessful, traumatic hemoptysis with estimated 250cc blood loss. Taken to [facility] where she was intubated there. Code stroke initiated, found to have L carotid occlusion. Was transferred to [facility] for mechanical thrombectomy. Unable to obtain ROS 2/2 intubation/sedation.  NIH 37, mRS unknown present on admission  (13 Mar 2022 16:02)  Underwent TICI 3 MT.    OVERNIGHT EVENTS: febrile, plt drop noted. failed CPAP.  VS, labs, imagining reviewed.    EXAMINATION:   General:  calm, NAD.  Neuro:  EO to verbal today, tracks, attempts to FC, PERRL 2mm, L gaze, moves L side, R side - minimal wdrl.  Cards:  irregular rhythm noted again  Respiratory:  no respiratory distress, intubated  Abdomen:  soft  Extremities:  no edema  Skin:  warm/dry
Pt remains intubated after failing SBT this AM. Responding reflexively to pain only during sedation break, otherwise no meaningful responsiveness.   Tele: sinus rhythm w/ 1st deg AV block, LBBB; occ blocked PAC; no significant bradycardia, high risk pauses or other arrhythmias.    MEDICATIONS  (STANDING):  aspirin  chewable 81 milliGRAM(s) Oral daily  atorvastatin 20 milliGRAM(s) Oral at bedtime  chlorhexidine 0.12% Liquid 15 milliLiter(s) Oral Mucosa every 12 hours  chlorhexidine 2% Cloths 1 Application(s) Topical daily  dextrose 40% Gel 15 Gram(s) Oral once  dextrose 5%. 1000 milliLiter(s) (50 mL/Hr) IV Continuous <Continuous>  dextrose 5%. 1000 milliLiter(s) (100 mL/Hr) IV Continuous <Continuous>  dextrose 50% Injectable 25 Gram(s) IV Push once  dextrose 50% Injectable 12.5 Gram(s) IV Push once  dextrose 50% Injectable 25 Gram(s) IV Push once  enoxaparin Injectable 40 milliGRAM(s) SubCutaneous every 24 hours  glucagon  Injectable 1 milliGRAM(s) IntraMuscular once  insulin lispro (ADMELOG) corrective regimen sliding scale   SubCutaneous every 6 hours  pantoprazole  Injectable 40 milliGRAM(s) IV Push daily  polyethylene glycol 3350 17 Gram(s) Oral daily  propofol Infusion 10 MICROgram(s)/kG/Min (4.8 mL/Hr) IV Continuous <Continuous>  senna 2 Tablet(s) Oral at bedtime    MEDICATIONS  (PRN):  acetaminophen    Suspension .. 650 milliGRAM(s) Enteral Tube every 6 hours PRN Temp greater or equal to 38C (100.4F), Mild Pain (1 - 3)  enalaprilat Injectable 1.25 milliGRAM(s) IV Push every 6 hours PRN SBP > 160  fentaNYL    Injectable 25 MICROGram(s) IV Push every 2 hours PRN Moderate Pain (4 - 6)  midazolam Injectable 2 milliGRAM(s) IV Push every 6 hours PRN Moderate pain      Allergies  Allergy Status Unknown    Intolerances  hydrALAZINE (Other)      Vital Signs Last 24 Hrs  T(C): 38 (16 Mar 2022 10:00), Max: 38.3 (15 Mar 2022 15:00)  T(F): 100.4 (16 Mar 2022 10:00), Max: 100.9 (15 Mar 2022 15:00)  HR: 78 (16 Mar 2022 10:00) (64 - 786)  BP: 142/56 (16 Mar 2022 10:00) (98/45 - 178/62)  BP(mean): 80 (16 Mar 2022 10:00) (60 - 99)  RR: 12 (16 Mar 2022 10:00) (11 - 22)  SpO2: 100% (16 Mar 2022 10:00) (98% - 100%)    Physical Exam:  Constitutional: intubated, sedated  Cardiovascular: +S1S2 RRR  Pulmonary: CTA b/l, intubated  GI: soft NTND +BS  Extremities: no pedal edema, +distal pulses b/l  Neuro: intubated, sedated  LABS:             8.8    7.21  )-----------( 84       ( 16 Mar 2022 03:26 )             27.4     141  |  108<H>  |  13.1  ----------------------------<  113<H>  3.6   |  23.0  |  0.60    Ca    7.9<L>      16 Mar 2022 03:26  Phos  2.0     03-16  Mg     2.0     03-16    PT/INR - ( 14 Mar 2022 17:48 )   PT: 12.9 sec;   INR: 1.11 ratio       PTT - ( 14 Mar 2022 17:48 )  PTT:29.6 sec      RADIOLOGY & ADDITIONAL TESTS:  THERESA: 3/14/22:   Summary:   1. Left ventricular ejection fraction, by visual estimation, is 65 to 70%.   2. Normal global left ventricular systolic function.   3. The mitral in-flow pattern reveals no discernable A-wave, therefore no comment on diastolic function can be made.   4. There is mild concentric left ventricular hypertrophy.   5. Normal right ventricular size and function, estimated RVSP least 29 mmHg.   6. Mildly enlarged left atrium.   7. Thickening of the anterior and posterior mitral valve leaflets.   8. Trace mitral valve regurgitation.   9. Mild tricuspid regurgitation.  10. Linear echogenicity presence below the aortic annulus cannot exclude a subaortic membrane. No outflow tract obstruction.  11. Mild aortic regurgitation.  12. Mild pulmonic valve regurgitation.  13. Large patent foramen ovale, with bidirectional shunting across atrial septum. Estimated left atrial pressure of 12 mmHg.  14. Mobile intra-atrial septum.  15. No left atrial appendage thrombus, prominent left atrial appendage and normal left atrial appendage velocities.  16. There is mild aortic root calcification.  17. Severe complex atheroma at the proximal aortic arch, up to 1 cm protrusion in to the lumen.  18. No prior THERESA study is available for comparison. Sources of cardiogenic emboli present including underlying Afib rhythm, PFO with right-to-left shunting and severe aortic arch atheroma. Findings discussed with NSICU team.  Gentry Conteh DO Electronically signed on 3/14/2022 at 5:34:42 PM      < from: MR Head No Cont (03.14.22 @ 16:09) >    FINDINGS:  Large acute infarct is noted involving the right posterior medial   temporal lobe and occipital lobe. Additional infarcts are noted involving   the bilateral cerebellum, left anteromedial temporal lobe, left basal   ganglia, left insula, and left precentral gyrus.    Ongoing evolution of hemorrhages in the left basal ganglia.    No hydrocephalus. No extra-axial fluid collections. The skull base flow   voids are present.    Bilateral cataract surgery. Orbits are otherwise unremarkable. Left   sphenoid sinus fluid is noted. Left mastoid effusion is noted. The   visualized osseous structures, soft tissues and partially visualized   parotid glands appear normal.    IMPRESSION:    -Multipleacute infarcts, largest involving the right temporal-occipital   lobe.    -Ongoing evolution of hemorrhage is in the left basal ganglia.    --- End of Report ---    < end of copied text >    
Pt remains intubated/sedated. Daughter at bedside and daughter-in-law at bedside.   Pt now in sinus rhythm, overnight some AF w/accelerated rates, no bradycardia or pauses identified.     MEDICATIONS  (STANDING):  aspirin  chewable 81 milliGRAM(s) Oral daily  atorvastatin 20 milliGRAM(s) Oral at bedtime  chlorhexidine 0.12% Liquid 15 milliLiter(s) Oral Mucosa every 12 hours  chlorhexidine 2% Cloths 1 Application(s) Topical daily  dextrose 40% Gel 15 Gram(s) Oral once  dextrose 5%. 1000 milliLiter(s) (50 mL/Hr) IV Continuous <Continuous>  dextrose 5%. 1000 milliLiter(s) (100 mL/Hr) IV Continuous <Continuous>  dextrose 50% Injectable 25 Gram(s) IV Push once  dextrose 50% Injectable 12.5 Gram(s) IV Push once  dextrose 50% Injectable 25 Gram(s) IV Push once  enoxaparin Injectable 40 milliGRAM(s) SubCutaneous every 24 hours  fentaNYL    Injectable 50 MICROGram(s) IV Push once  glucagon  Injectable 1 milliGRAM(s) IntraMuscular once  insulin lispro (ADMELOG) corrective regimen sliding scale   SubCutaneous every 6 hours  pantoprazole  Injectable 40 milliGRAM(s) IV Push daily  propofol Infusion 10 MICROgram(s)/kG/Min (4.8 mL/Hr) IV Continuous <Continuous>  sodium chloride 0.9%. 1000 milliLiter(s) (50 mL/Hr) IV Continuous <Continuous>    Allergies:  Allergy Status Unknown    Intolerances:  hydrALAZINE (Other)    PAST MEDICAL & SURGICAL HISTORY:  HTN (hypertension)  HLD (hyperlipidemia)  History of left bundle branch block  2019 novel coronavirus disease (COVID-19) February 2022    Vital Signs Last 24 Hrs  T(C): 37.7 (15 Mar 2022 10:00), Max: 38.4 (14 Mar 2022 12:55)  T(F): 99.9 (15 Mar 2022 10:00), Max: 101.1 (14 Mar 2022 12:55)  HR: 103 (15 Mar 2022 10:00) (73 - 137)  BP: 172/69 (15 Mar 2022 10:00) (109/62 - 172/69)  BP(mean): 99 (15 Mar 2022 10:00) (57 - 101)  RR: 33 (15 Mar 2022 10:00) (11 - 33)  SpO2: 98% (15 Mar 2022 10:00) (98% - 100%)    Physical Exam:  intubated/ sedated   Cardiovascular: +S1S2 RRR  Pulmonary: intubated, equal chest rise   GI: soft NTND +BS  Extremities: no pedal edema, +distal pulses b/l    LABS:                        9.9    9.76  )-----------( 144      ( 15 Mar 2022 03:38 )             30.5     03-15    144  |  110<H>  |  14.9  ----------------------------<  132<H>  3.7   |  23.0  |  0.68    Ca    8.3<L>      15 Mar 2022 03:38  Phos  2.5     03-15  Mg     2.0     03-15    PT/INR - ( 14 Mar 2022 17:48 )   PT: 12.9 sec;   INR: 1.11 ratio       PTT - ( 14 Mar 2022 17:48 )  PTT:29.6 sec    RADIOLOGY & ADDITIONAL TESTS:  THERESA: 3/14/22:   Summary:   1. Left ventricular ejection fraction, by visual estimation, is 65 to 70%.   2. Normal global left ventricular systolic function.   3. The mitral in-flow pattern reveals no discernable A-wave, therefore no comment on diastolic function can be made.   4. There is mild concentric left ventricular hypertrophy.   5. Normal right ventricular size and function, estimated RVSP least 29 mmHg.   6. Mildly enlarged left atrium.   7. Thickening of the anterior and posterior mitral valve leaflets.   8. Trace mitral valve regurgitation.   9. Mild tricuspid regurgitation.  10. Linear echogenicity presence below the aortic annulus cannot exclude a subaortic membrane. No outflow tract obstruction.  11. Mild aortic regurgitation.  12. Mild pulmonic valve regurgitation.  13. Large patent foramen ovale, with bidirectional shunting across atrial septum. Estimated left atrial pressure of 12 mmHg.  14. Mobile intra-atrial septum.  15. No left atrial appendage thrombus, prominent left atrial appendage and normal left atrial appendage velocities.  16. There is mild aortic root calcification.  17. Severe complex atheroma at the proximal aortic arch, up to 1 cm protrusion in to the lumen.  18. No prior THERESA study is available for comparison. Sources of cardiogenic emboli present including underlying Afib rhythm, PFO with right-to-left shunting and severe aortic arch atheroma. Findings discussed with NSICU team.  Gentry Conteh DO Electronically signed on 3/14/2022 at 5:34:42 PM    < from: CT Head No Cont (03.13.22 @ 22:03) >  INTERPRETATION:  Clinical indication: Status post thrombectomy.    Multiple axial sections were performed from base skull to vertex without   contrast enhancement. Coronal and sagittal reconstructions were performed.    This exam is compared with prior head CT performed on March 13, 2022.    Abnormal low-attenuation involving the left temporal cortical and   subcortical region is seen which is compatiblewith an acute infarct.   Loss of the gray-white differentiation is seen is well. This is   compatible with an acute right PCA infarct.    Abnormal areas of low-attenuation involving the posterior medial right   temporal region. This is compatible with acute left MCA infarct.    There is evidence of vague area of high attenuation seen on left corona   radiata region. High attenuation also again seen involving the left basal   ganglia region. Associated with this is areas of hemorrhage or retained   contrast. MRI can be done to better characterize finding.    Evaluation of the osseous structures with the appropriate window   demonstrates hyperostosis frontalis interna    Left sphenoid sinus mucosal thickening is seen.    Both mastoid and middleear regions appear clear.    IMPRESSION: Areas of acute infarct as described.    Vague areas of high attenuation involving the left basal ganglia and   corona radiata region.    --- End of Report ---    ZARA SAUCEDO MD; Attending Radiologist  This document has been electronically signed. Mar 14 2022  8:28AM    < end of copied text >      < from: CT Head No Cont (03.14.22 @ 05:25) >  IMPRESSION:  *  REGIONS OF INCREASED DENSITY WITHIN THE LEFT BASAL GANGLIA AGAIN SEEN.   FAVOR CONTRAST ENHANCEMENT. LIKELY ASSOCIATED ACUTE ISCHEMIA.  *  ACUTE RIGHT PCA INFARCT BETTER SEEN.  *  INFERIOR LEFT CEREBELLAR ISCHEMIA.  *  NO MIDLINE SHIFT OR HYDROCEPHALUS    --- End of Report ---  JENNIFER OLSON MD; Attending Radiologist    < end of copied text >    
HPI:  87 yo F with PMH HTN, HLD, chronic LBBB, covid 3 weeks ago who presented to [facility] with unresponsiveness. Per ED physician who obtained history from patient's son, patient's last known well was 3/12 2200, walking and talking normally. Patient then found this am speaking normally around 0930 when had sudden ? syncopal episode. EMS called, found to be unresponsive and possible seizure-like activity in the field. Intubation attempted in field and unsuccessful, traumatic hemoptysis with estimated 250cc blood loss. Taken to [facility] where she was intubated there. Code stroke initiated, found to have L carotid occlusion. Was transferred to [facility] for mechanical thrombectomy. Unable to obtain ROS 2/2 intubation/sedation.  NIH 37, mRS unknown present on admission  (13 Mar 2022 16:02)  Underwent TICI 3 MT.    OVERNIGHT EVENTS: family decided for DNR and compassionate extubation in PM.  VS, labs, imagining reviewed.    EXAMINATION:   General:  calm, NAD.  Neuro:  EO to verbal, no FC, no tracking, PERRL 2mm, L gaze.  Cards:  irregular rhythm noted again  Respiratory:  no respiratory distress, intubated  
HPI:  89 yo F with PMH HTN, HLD, chronic LBBB, covid 3 weeks ago who presented to [facility] with unresponsiveness. Per ED physician who obtained history from patient's son, patient's last known well was 3/12 2200, walking and talking normally. Patient then found this am speaking normally around 0930 when had sudden ? syncopal episode. EMS called, found to be unresponsive and possible seizure-like activity in the field. Intubation attempted in field and unsuccessful, traumatic hemoptysis with estimated 250cc blood loss. Taken to [facility] where she was intubated there. Code stroke initiated, found to have L carotid occlusion. Was transferred to [facility] for mechanical thrombectomy. Unable to obtain ROS 2/2 intubation/sedation.    NIH 37, mRS unknown present on admission  (13 Mar 2022 16:02)      INTERVAL HPI/OVERNIGHT EVENTS:  No acute events overnight    Vital Signs Last 24 Hrs  T(C): 37.5 (16 Mar 2022 15:00), Max: 38.2 (15 Mar 2022 17:00)  T(F): 99.5 (16 Mar 2022 15:00), Max: 100.8 (15 Mar 2022 17:00)  HR: 82 (16 Mar 2022 15:52) (63 - 86)  BP: 118/53 (16 Mar 2022 15:00) (98/45 - 142/58)  BP(mean): 73 (16 Mar 2022 15:00) (60 - 99)  RR: 12 (16 Mar 2022 15:00) (11 - 18)  SpO2: 100% (16 Mar 2022 15:52) (98% - 100%)    PHYSICAL EXAM:  GENERAL: NAD, intubated/ sedated on prop gtt  HEAD:  Atraumatic, normocephalic  WOUND: Groin site c/d/i  MIGUEL COMA SCORE: E- V- M- =9T       E: 3= to voice        V:  1T= intubated       M:5= localizes   MENTAL STATUS:  Sedated/ intubated, OE to voice, L gaze preference does not overcome, No blink to confrontation on right side, Nonverbal, not following or mimicking commands  CRANIAL NERVES:  L gaze pref, does not overcome, PERRL, nonverbal, not FC  REFLEXES: PERRL.  MOTOR: LUE localizes, LLE WD, RUE/ RLE minimal WD  SENSATION: grossly intact to noxious  COORDINATION: Gait not assessed    LABS:                        8.8    7.21  )-----------( 84       ( 16 Mar 2022 03:26 )             27.4     03-16    141  |  108<H>  |  13.1  ----------------------------<  113<H>  3.6   |  23.0  |  0.60    Ca    7.9<L>      16 Mar 2022 03:26  Phos  2.0     03-16  Mg     2.0     03-16      PT/INR - ( 14 Mar 2022 17:48 )   PT: 12.9 sec;   INR: 1.11 ratio         PTT - ( 14 Mar 2022 17:48 )  PTT:29.6 sec      03-15 @ 07:01  -  03-16 @ 07:00  --------------------------------------------------------  IN: 1301 mL / OUT: 1007 mL / NET: 294 mL    03-16 @ 07:01  -  03-16 @ 16:55  --------------------------------------------------------  IN: 884.4 mL / OUT: 330 mL / NET: 554.4 mL        RADIOLOGY & ADDITIONAL TESTS:  < from: MR Head No Cont (03.14.22 @ 16:09) >    ACC: [id number] EXAM:  MR BRAIN                          PROCEDURE DATE:  03/14/2022          INTERPRETATION:  CLINICAL INDICATION: Stroke workup.    TECHNIQUE: Multi-planar multi-sequential MR imaging of the brain was   performed without intravenouscontrast.    COMPARISON: CT head 3/14/2022.    FINDINGS:  Large acute infarct is noted involving the right posterior medial   temporal lobe and occipital lobe. Additional infarcts are noted involving   the bilateral cerebellum, left anteromedial temporal lobe, left basal   ganglia, left insula, and left precentral gyrus.    Ongoing evolution of hemorrhages in the left basal ganglia.    No hydrocephalus. No extra-axial fluid collections. The skull base flow   voids are present.    Bilateral cataract surgery. Orbits are otherwise unremarkable. Left   sphenoid sinus fluid is noted. Left mastoid effusion is noted. The   visualized osseous structures, soft tissues and partially visualized   parotid glands appear normal.    IMPRESSION:    -Multipleacute infarcts, largest involving the right temporal-occipital   lobe.    -Ongoing evolution of hemorrhage is in the left basal ganglia.    --- End of Report ---            CON MONTERO MD; Attending Radiologist  This document has been electronically signed. Mar 15 2022  8:40AM    < end of copied text >      
[facility] PALLIATIVE MEDICINE     CC: FOLLOW UP VISIT + GOC    INTERVAL HPI/OVERNIGHT EVENTS:  Source if other than patient:  []Family   [x]Team     Updated by NSICU that overnight son/HCP Von, after speaking with rest of family, has made decision for DNR and no escalation of care with plans for transition to full comfort measures with palliative extubation today once family able to come and visit.     Met with daughter Tanya and granddaughter at bedside along with ALAN Marvin to offer support. Daughter also reaffirmed family's decision as above. Emotional support provided and questions answered.     PRESENT SYMPTOMS:     Dyspnea: intubated  Nausea/Vomiting:  No  Anxiety:   No  Depression:  unable  Fatigue: Yes    Loss of appetite: NPO  Constipation: unable    Pain: No            Character-            Duration-            Effect-            Factors-            Frequency-            Location-            Severity-    Pain AD Score:  [email or web address].pdf (press ctrl + left click to view)    Review of Systems: Unable to obtain due to poor mentation/encephalopathy     MEDICATIONS  (STANDING):  aspirin  chewable 81 milliGRAM(s) Oral daily  chlorhexidine 0.12% Liquid 15 milliLiter(s) Oral Mucosa every 12 hours  chlorhexidine 2% Cloths 1 Application(s) Topical daily  glucagon  Injectable 1 milliGRAM(s) IntraMuscular once  pantoprazole  Injectable 40 milliGRAM(s) IV Push daily  propofol Infusion 10 MICROgram(s)/kG/Min (4.8 mL/Hr) IV Continuous <Continuous>    MEDICATIONS  (PRN):  acetaminophen    Suspension .. 650 milliGRAM(s) Enteral Tube every 6 hours PRN Temp greater or equal to 38C (100.4F), Mild Pain (1 - 3)  enalaprilat Injectable 1.25 milliGRAM(s) IV Push every 6 hours PRN SBP > 160  fentaNYL    Injectable 25 MICROGram(s) IV Push every 2 hours PRN Moderate Pain (4 - 6)  midazolam Injectable 2 milliGRAM(s) IV Push every 6 hours PRN Moderate pain      PHYSICAL EXAM:    Vital Signs Last 24 Hrs  T(C): 38.2 (17 Mar 2022 11:00), Max: 38.2 (17 Mar 2022 11:00)  T(F): 100.8 (17 Mar 2022 11:00), Max: 100.8 (17 Mar 2022 11:00)  HR: 92 (17 Mar 2022 11:00) (63 - 99)  BP: 138/63 (17 Mar 2022 11:00) (110/56 - 159/63)  BP(mean): 84 (17 Mar 2022 11:00) (71 - 100)  RR: 12 (17 Mar 2022 11:00) (11 - 26)  SpO2: 96% (17 Mar 2022 11:00) (95% - 100%)       Karnofsky: 10 %    GEN: resting comfortably and no acute distress    HEENT: mucous membrane moist      Lungs: comfortable, nonlabored +ETT    CV: +s1/s2 regular rate and rhythm      GI: +BS, abdomen soft, nontender, nondistended      :  bowers    MSK: no cyanosis or edema +bedbound    NEURO: nonfocal. lethargic, nonverbal_      Skin: warm and dry.       LABS:                          8.8    7.21  )-----------( 84       ( 16 Mar 2022 03:26 )             27.4     03-16    141  |  108<H>  |  13.1  ----------------------------<  113<H>  3.6   |  23.0  |  0.60    Ca    7.9<L>      16 Mar 2022 03:26  Phos  2.0     03-16  Mg     2.0     03-16        Urinalysis Basic - ( 16 Mar 2022 17:18 )    Color: Yellow / Appearance: Clear / S.005 / pH: x  Gluc: x / Ketone: Negative  / Bili: Negative / Urobili: Negative mg/dL   Blood: x / Protein: Negative / Nitrite: Negative   Leuk Esterase: Trace / RBC: 3-5 /HPF / WBC 3-5 /HPF   Sq Epi: x / Non Sq Epi: Occasional / Bacteria: Moderate      I&O's Summary    16 Mar 2022 07:01  -  17 Mar 2022 07:00  --------------------------------------------------------  IN: 1189.5 mL / OUT: 1040 mL / NET: 149.5 mL    17 Mar 2022 07:01  -  17 Mar 2022 12:21  --------------------------------------------------------  IN: 153 mL / OUT: 140 mL / NET: 13 mL        RADIOLOGY & ADDITIONAL STUDIES:     CXR 3/17/22    ACC: [id number] EXAM:  XR CHEST PORTABLE ROUTINE 1V                          PROCEDURE DATE:  2022      INTERPRETATION:  AP chest on 2022 at 11:20 AM. Patient requires   intubation. Patient has fever.    Heart magnified by technique.    Endotracheal tube and nasogastric tube remain.    There is a small infiltrate at left base medially somewhat increased from   .    IMPRESSION: Small left base infiltrate increased from prior.    --- End of Report ---    PAT HARRELL MD; Attending Radiologist  This document has been electronically signed. Mar 17 2022 10:18AM      ADVANCE DIRECTIVES/TREATMENT PREFERENCES:  DNR YES NO  Completed on:                     VIJIST  YES NO   Completed on: by NSICU 3/16/22  Living Will  YES NO   Completed on:       
HPI: 87 yo F with PMH HTN, HLD, chronic LBBB, covid 3 weeks ago who presented to [facility] with unresponsiveness. Per ED physician who obtained history from patient's son, patient's last known well was 3/12 2200, walking and talking normally. Patient then found this am speaking normally around 0930 when had sudden ? syncopal episode. EMS called, found to be unresponsive and possible seizure-like activity in the field. Intubation attempted in field and unsuccessful, traumatic hemoptysis with estimated 250cc blood loss. Taken to [facility] where she was intubated there. Code stroke initiated, found to have L carotid occlusion. Was transferred to [facility] for mechanical thrombectomy. Unable to obtain ROS 2/2 intubation/sedation.    NIH 37, mRS unknown present on admission      Interval History: Patient transferred from  & emergently taken to [facility] for mechanical thrombectomy of l ICA occlusion with Tici=3. Patient admitted to neuro ICU after thrombectomy for close observation. Rpt head CT L BG blush, possible contrast extravasation.     Physical Exam:  Constitutional: NAD, intubated    Neuro  * Mental Status:  Intubated, with no sedation.   * Cranial Nerves: PERRL, doesn;t blink to threat. Left Gaze, doesn't cross midline. + cough/gag  * Motor: Moves Left UE spontaneously, WD B/l LE L>R, R UE minimal WD. Not FC  * Sensory: Sensation to noxious stimuli  * Reflexes: Not assessed  * Gait: Not assessed  * wound: groin c/d/i no hematoma    Vital Signs Last 24 Hrs  T(C): 36.5 (13 Mar 2022 23:59), Max: 36.8 (13 Mar 2022 20:00)  T(F): 97.7 (13 Mar 2022 23:59), Max: 98.3 (13 Mar 2022 20:00)  HR: 88 (14 Mar 2022 02:55) (68 - 98)  BP: 109/59 (14 Mar 2022 02:55) (83/50 - 121/58)  BP(mean): 75 (14 Mar 2022 02:55) (62 - 83)  RR: 13 (14 Mar 2022 02:55) (11 - 21)  SpO2: 100% (14 Mar 2022 02:55) (99% - 100%)    Labs & Radiology:                     11.8   12.71 )-----------( 193      ( 13 Mar 2022 20:51 )             34.7     140  |  106  |  13.2  ----------------------------<  194<H>  3.9   |  20.0<L>  |  0.64    Ca    8.0<L>      13 Mar 2022 20:51  Phos  3.3     03-13  Mg     1.8     03-13  PT/INR - ( 13 Mar 2022 20:51 )   PT: 12.6 sec;   INR: 1.09 ratio     PTT - ( 13 Mar 2022 20:51 )  PTT:24.3 sec  ABG - ( 13 Mar 2022 22:29 )  pH, Arterial: 7.410 pH, Blood: x     /  pCO2: 37    /  pO2: 153   / HCO3: 24    / Base Excess: -1.1  /  SaO2: 100.0     Neurosurgery Imaging:  Imaging reviewed by an attending  
HPI:  87 yo F with PMH HTN, HLD, chronic LBBB, covid 3 weeks ago who presented to [facility] with unresponsiveness. Per ED physician who obtained history from patient's son, patient's last known well was 3/12 2200, walking and talking normally. Patient then found this am speaking normally around 0930 when had sudden ? syncopal episode. EMS called, found to be unresponsive and possible seizure-like activity in the field. Intubation attempted in field and unsuccessful, traumatic hemoptysis with estimated 250cc blood loss. Taken to [facility] where she was intubated there. Code stroke initiated, found to have L carotid occlusion. Was transferred to [facility] for mechanical thrombectomy. Unable to obtain ROS 2/2 intubation/sedation.  NIH 37, mRS unknown present on admission  (13 Mar 2022 16:02)  Underwent TICI 3 MT.    OVERNIGHT EVENTS: No acute events overnight.    VITALS:  T(C): , Max: 38.4 (03-14-22 @ 12:55)  HR:  (78 - 137)  BP:  (83/50 - 143/60)  ABP:  (99/40 - 167/69)  RR:  (12 - 28)  SpO2:  (99% - 100%)  Wt(kg): --  Device: Avea, Mode: CPAP with PS, FiO2: 40, PEEP: 5, PS: 5, MAP: 8    03-13-22 @ 07:01  -  03-14-22 @ 07:00  --------------------------------------------------------  IN: 810.4 mL / OUT: 622 mL / NET: 188.4 mL    03-14-22 @ 07:01  -  03-14-22 @ 19:27  --------------------------------------------------------  IN: 1600 mL / OUT: 487 mL / NET: 1113 mL      LABS:  Na: 143 (03-14 @ 04:06), 140 (03-13 @ 20:51)  K: 4.0 (03-14 @ 04:06), 3.9 (03-13 @ 20:51)  Cl: 108 (03-14 @ 04:06), 106 (03-13 @ 20:51)  CO2: 22.0 (03-14 @ 04:06), 20.0 (03-13 @ 20:51)  BUN: 12.7 (03-14 @ 04:06), 13.2 (03-13 @ 20:51)  Cr: 0.53 (03-14 @ 04:06), 0.64 (03-13 @ 20:51)  Glu: 148(03-14 @ 04:06), 194(03-13 @ 20:51)    Hgb: 11.4 (03-14 @ 04:06), 11.8 (03-13 @ 20:51)  Hct: 34.5 (03-14 @ 04:06), 34.7 (03-13 @ 20:51)  WBC: 9.90 (03-14 @ 04:06), 12.71 (03-13 @ 20:51)  Plt: 175 (03-14 @ 04:06), 193 (03-13 @ 20:51)    INR: 1.11 03-14-22 @ 17:48, 1.09 03-13-22 @ 20:51  PTT: 29.6 03-14-22 @ 17:48, 24.3 03-13-22 @ 20:51    IMAGING:   Recent imaging studies were reviewed.    MEDICATIONS:  aspirin  chewable 81 milliGRAM(s) Oral daily  atorvastatin 20 milliGRAM(s) Oral at bedtime  chlorhexidine 0.12% Liquid 15 milliLiter(s) Oral Mucosa every 12 hours  chlorhexidine 2% Cloths 1 Application(s) Topical daily  dextrose 40% Gel 15 Gram(s) Oral once  dextrose 5%. 1000 milliLiter(s) IV Continuous <Continuous>  dextrose 5%. 1000 milliLiter(s) IV Continuous <Continuous>  dextrose 50% Injectable 25 Gram(s) IV Push once  dextrose 50% Injectable 12.5 Gram(s) IV Push once  dextrose 50% Injectable 25 Gram(s) IV Push once  enoxaparin Injectable 40 milliGRAM(s) SubCutaneous every 24 hours  glucagon  Injectable 1 milliGRAM(s) IntraMuscular once  insulin lispro (ADMELOG) corrective regimen sliding scale   SubCutaneous every 6 hours  pantoprazole  Injectable 40 milliGRAM(s) IV Push daily  propofol Infusion 10 MICROgram(s)/kG/Min IV Continuous <Continuous>  sodium chloride 0.9% Bolus 500 milliLiter(s) IV Bolus once  sodium chloride 0.9%. 1000 milliLiter(s) IV Continuous <Continuous>    EXAMINATION:  General:  calm, NAD.  Neuro:  no EO, comatose, no FC, PERRL 2mm, L gaze, moves L side, R side - minimal wdrl.  Cards:  irregular rrhythm  Respiratory:  no respiratory distress, intubated  Abdomen:  soft  Extremities:  no edema  Skin:  warm/dry
                                                         [facility]                                                         CARDIOLOGY AT [address], [place] [address]                                                         Telephone: [phone]. Fax:[phone]                                                                             PROGRESS NOTE    Reason for follow up: Left ICA threombectomy, right sided stroke, New Afib, large PFO, aortic arethroma  Update: patient remains intubated in NSICU. Off sedation, not following commands      Review of symptoms:   Patient is intubated and cannot participate in exam.      Vitals:  T(C): 37.7 (03-15-22 @ 10:00), Max: 38.4 (03-14-22 @ 12:55)  HR: 103 (03-15-22 @ 10:00) (73 - 123)  BP: 172/69 (03-15-22 @ 10:00) (109/62 - 172/69)  RR: 23 (03-15-22 @ 10:00) (11 - 28)  SpO2: 98% (03-15-22 @ 10:00) (98% - 100%)  Wt(kg): --  I&O's Summary    14 Mar 2022 07:01  -  15 Mar 2022 07:00  --------------------------------------------------------  IN: 2200 mL / OUT: 742 mL / NET: 1458 mL    15 Mar 2022 07:01  -  15 Mar 2022 11:57  --------------------------------------------------------  IN: 200 mL / OUT: 87 mL / NET: 113 mL      Weight (kg): 110.6 (03-13 @ 18:37)    PHYSICAL EXAM:  Appearance: restless, not following commands   HEENT:  Atraumatic. Normocephalic.  Normal oral mucosa  Neurologic: awake, not following commands  Cardiovascular: RRR S1 S2,  Respiratory: Lungs clear to auscultation, unlabored   Gastrointestinal:  Soft, Non-tender, + BS  Lower Extremities: 2+ Peripheral Pulses, No clubbing, cyanosis, or edema  Psychiatry: Patient is calm. No agitation.   Skin: warm and dry.    CURRENT CARDIAC MEDICATIONS:  enalaprilat Injectable 1.25 milliGRAM(s) IV Push every 6 hours PRN      CURRENT OTHER MEDICATIONS:  fentaNYL    Injectable 50 MICROGram(s) IV Push once  midazolam Injectable 2 milliGRAM(s) IV Push every 6 hours PRN Moderate pain  midazolam Injectable 2 milliGRAM(s) IV Push once  propofol Infusion 10 MICROgram(s)/kG/Min (4.8 mL/Hr) IV Continuous <Continuous>  pantoprazole  Injectable 40 milliGRAM(s) IV Push daily  atorvastatin 20 milliGRAM(s) Oral at bedtime  dextrose 40% Gel 15 Gram(s) Oral once, Stop order after: 1 Doses  dextrose 50% Injectable 25 Gram(s) IV Push once, Stop order after: 1 Doses  dextrose 50% Injectable 12.5 Gram(s) IV Push once, Stop order after: 1 Doses  dextrose 50% Injectable 25 Gram(s) IV Push once, Stop order after: 1 Doses  glucagon  Injectable 1 milliGRAM(s) IntraMuscular once, Stop order after: 1 Doses  insulin lispro (ADMELOG) corrective regimen sliding scale   SubCutaneous every 6 hours  aspirin  chewable 81 milliGRAM(s) Oral daily  chlorhexidine 0.12% Liquid 15 milliLiter(s) Oral Mucosa every 12 hours  chlorhexidine 2% Cloths 1 Application(s) Topical daily  dextrose 5%. 1000 milliLiter(s) (50 mL/Hr) IV Continuous <Continuous>  dextrose 5%. 1000 milliLiter(s) (100 mL/Hr) IV Continuous <Continuous>  enoxaparin Injectable 40 milliGRAM(s) SubCutaneous every 24 hours  sodium chloride 0.9%. 1000 milliLiter(s) (50 mL/Hr) IV Continuous <Continuous>      LABS:	 	  ( 14 Mar 2022 20:13 )  Troponin T  <0.01,  CPK  X    , CKMB  X    , BNP X        , ( 14 Mar 2022 17:47 )  Troponin T  <0.01,  CPK  X    , CKMB  X    , BNP X        , ( 14 Mar 2022 11:01 )  Troponin T  <0.01,  CPK  26   , CKMB  X    , BNP X                                  9.9    9.76  )-----------( 144      ( 15 Mar 2022 03:38 )             30.5     03-15    144  |  110<H>  |  14.9  ----------------------------<  132<H>  3.7   |  23.0  |  0.68    Ca    8.3<L>      15 Mar 2022 03:38  Phos  2.5     03-15  Mg     2.0     03-15      PT/INR/PTT ( 14 Mar 2022 17:48 )                       :                       :      12.9         :       29.6                  .        .                   .              .           .       1.11        .                                       Lipid Profile: Date: 03-14 @ 04:06  Total cholesterol 196; Direct LDL: --; HDL: 46; Triglycerides:102    HgA1c:   TSH: Thyroid Stimulating Hormone, Serum: 0.64 uIU/mL      TELEMETRY:   ECG: reviewed    DIAGNOSTIC TESTING:  [ ] Echocardiogram:   < from: THERESA Echo Doppler (03.14.22 @ 16:40) >  PHYSICIAN INTERPRETATION:  Left Ventricle:  Global LV systolic function was normal. Left ventricular ejection   fraction, by visual estimation, is 65 to 70%. The mitral in-flow pattern   reveals no discernable A-wave, therefore no comment on diastolic function   can be made.  Right Ventricle: Normal right ventricular size and function.  Left Atrium: Mildly enlarged left atrium. No left atrial appendage   thrombus is seen, prominent left atrial appendage and normal left atrial   appendage velocities. Mobile intra-atrial septum.  Right Atrium: The right atrium is normal in size.  Mitral Valve: Thickening of the anterior and posterior mitral valve   leaflets. Trace mitral valve regurgitation is seen.  Tricuspid Valve: Structurally normal tricuspid valve, with normal leaflet   excursion. Mild tricuspid regurgitation is visualized.  Aortic Valve: The aorticvalve is trileaflet. No evidence of aortic   stenosis. Mild aortic valve regurgitation is seen.  Pulmonic Valve: Structurally normal pulmonic valve, with normal leaflet   excursion. Mild pulmonic valve regurgitation.  Aorta: There is mild aortic rootcalcification.  Venous: The pulmonary veins appear normal.  Shunts: Agitated saline contrast was given intravenously to evaluate for   intracardiac shunting. A large patent foramen ovale is detected, with   bidirectional shunting across atrial septum.  In comparison to the previous echocardiogram(s): No prior THERESA study is   available for comparison. Sources of cardiogenic emboli present including   underlying Afib rhythm, PFO with right-to-left shunting and severe aortic   arch atheroma.    Summary:   1. Left ventricular ejection fraction, by visual estimation, is 65 to   70%.   2. Normal global left ventricular systolic function.   3. The mitral in-flow pattern reveals no discernable A-wave, therefore   no comment on diastolic function can be made.   4. There is mild concentric left ventricular hypertrophy.   5. Normal right ventricular size and function, estimated RVSP least 29   mmHg.   6. Mildly enlarged left atrium.   7. Thickening of the anterior and posterior mitral valve leaflets.   8. Trace mitral valve regurgitation.   9. Mild tricuspid regurgitation.  10. Linear echogenicity presence below the aortic annulus cannot exclude   a subaortic membrane. No outflow tract obstruction.  11. Mild aortic regurgitation.  12. Mild pulmonic valve regurgitation.  13. Large patent foramen ovale, with bidirectional shunting across atrial   septum. Estimated left atrial pressure of 12 mmHg.  14. Mobile intra-atrial septum.  15. No left atrial appendage thrombus, prominent left atrial appendage   and normal left atrial appendage velocities.  16. There is mild aortic root calcification.  17. Severe complex atheroma at the proximal aortic arch, up to 1 cm   protrusion in to the lumen.  18. No prior THERESA study is available for comparison. Sources of   cardiogenic emboli present including underlying Afib rhythm, PFO with   right-to-left shunting and severe aortic arch atheroma. Findings   discussed with NSICU team.    Gentry Mast DO Electronically signed on 3/14/2022 at 5:34:42 PM    < end of copied text >    [ ]  Catheterization:  [ ] Stress Test:    OTHER: 	    < from: US Duplex Venous Lower Ext Complete, Bilateral (03.14.22 @ 20:11) >  INTERPRETATION:  CLINICAL INFORMATION: Hypercoagulable state.    COMPARISON: None available.    TECHNIQUE: Duplex sonography of the BILATERAL LOWER extremity veins with   color and spectral Doppler, with and without compression.    FINDINGS:    RIGHT:  Normal compressibility of the RIGHT common femoral, femoral and popliteal   veins.  Doppler examination shows normal spontaneous andphasic flow.  There is acute deep vein thrombosis involving the right posterior tibial   vein and peroneal veins.    LEFT:  Normal compressibility of the LEFT common femoral, femoral and popliteal   veins.  Doppler examination shows normal spontaneous and phasic flow.  There is acute deep vein thrombosis involving the left posterior tibial,   peroneal and tibial peroneal trunk.    IMPRESSION:  Bilateral calf acute deep vein thrombosis as described above.  Acute deep venous thrombosis: below the knee.    Findings discussed with Dr. Mcgill on  3/14/2022 8:14 PM with read back.    < end of copied text >  < from: MR Venogram Head No Cont (03.14.22 @ 16:10) >    MRA NECK:    Standard configuration of the aortic arch with no gross ostial stenosis   of the great vessels.    The common carotid arteries are patent bilaterally without evidence of   stenosis. There is no narrowing of the cervical internal carotid arteries   bilaterally. The vertebral arteries are patent bilaterally throughout   their course.    MRA HEAD:    Normal distal internal carotid arteries.  The proximal anterior, middle and posterior cerebral arteries are patent   bilaterally.  Normal vertebrobasilar system.  No evidence of vascular stenosis, occlusion, or vascular malformation.    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    MR venogram head:  Within limitations of noncontrast study,  No dural venous sinus thrombosis identified. Focal moderate stenosis of   the right transverse/sigmoid sinus junction.  Hypoplastic left transverse and sigmoid sinuses.    IMPRESSION:  Within limits of this motion degraded exam:    MRA neck:  -No vaso-occlusive disease.    MRA brain:  -No vaso-occlusive disease.  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    MR venogram head:  -Within limitations of noncontrast study, no dural venous sinus   thrombosis identified.  -Focal moderate stenosis of the right transverse/sigmoid sinus junction.  -Hypoplastic left transverse and sigmoid sinuses.    _____________  NASCET criteria for internal carotid artery stenosis:  Mild: 0% to 49%  Moderate: 50% to 69%  Severe: 70% to 99%  Complete Occlusion    --- End of Report ---    < end of copied text >

## 2022-03-17 NOTE — PROGRESS NOTE ADULT - THIS PATIENT HAS THE FOLLOWING CONDITION(S)/DIAGNOSES ON THIS ADMISSION:
Encephalopathy/Acute Respiratory Failure

## 2022-03-17 NOTE — CHART NOTE - NSCHARTNOTEFT_GEN_A_CORE
After discussion with the family the patient has been made DNR and only comfort measures to be pursued as such no role for a pacemaker in the future. EP will sign off.

## 2022-03-17 NOTE — CHART NOTE - NSCHARTNOTEFT_GEN_A_CORE
Spoke with patient's son, Irwin Rosenbaum. He informed me that following the family discussion had today with the ICU team his family had discussed further. They decided in agreement to proceed with comfort care. Irwin is patient's HCP. He requested to wait until tomorrow to proceed with full comfort measures and compassionate extubation however has made her DNR at this time. He does not want any further escalation of care including vasopressors and lab draws. I offered support and stated that I would call Irwin immediately if I had any concerns that the patient was deteriorating overnight.     Notified ICU attending and patient's RN regarding decision and change in care plan.

## 2022-03-17 NOTE — PROGRESS NOTE ADULT - TIME BILLING
D/W RN, daughter, MARISOL CASTELLANO and Dr. Jason  Chart review, meds, labs, imaging
clinical examination, review of data and images, discussion of treatment with the multidisciplinary team and any consultants involved in this patient’s care as well as family discussion.
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
as above.

## 2022-03-17 NOTE — CHART NOTE - NSCHARTNOTEFT_GEN_A_CORE
Palliative care social work note.    SW and palliative care physician DR Man met with patients daughter and grand daughter at bedside. Support offered and questions clarified regarding comfort care measures and planning for elective withdrawal after other family arrives.

## 2022-03-17 NOTE — DISCHARGE NOTE FOR THE EXPIRED PATIENT - OTHER SIGNIFICANT FINDINGS
date and time of expiration 3/17/22 19:42  Bilateral soft wrist restraints discontinued 3/17/22 at 1300.    pt  within 24 hours of wearing soft wrist restraints date and time of expiration 3/17/22 19:42  Bilateral soft wrist restraints discontinued 3/17/22 at 1300. CMS log updated

## 2022-03-17 NOTE — PROGRESS NOTE ADULT - ASSESSMENT
89yo F with hx of HTN, HLD, chronic LBBB, recent covid (feb 2022) transferred from [facility] after found to have Lt ICA occlusion s/p emergent mechanical thrombectomy complicated by rapid Afib, complete heart block and subsequent MRI with multiple bilateral embolic infarcts, BLE DVTs and overall poor prognosis. Ultimately family opted for DNR with no escalation of care with plans to proceed with full comfort measures and palliative extubation once family able to all come visit.     PLAN    Multiple Embolic CVAs  - s/p Lt thrombectomy on 3/13 by NeuroIR  - poor prognosis for meaningful neurological recovery   - currently no escalation of care  - mgnt per NSX and NSICU     Acute Respiratory Failure   - intubated and sedated, on vent support, wean as tolerated    BLE DVT  - anticoagulation on hold due to CVA, defer to NSICU    Advance Care Planning  - DNR, no escalation of care, MOLST by NSICU   - HCP is kota Longoria    Palliative Care Encounter  Overnight, family made patient DNR with no escalation of care. Plan is for comfort measures and palliative extubation once all family able to come visit today. Emotional support provided to daughter at bedside.     -------------------------------------------------------------------------------------------------  Please allow me to provide the following recs if family opts to proceed with comfort measures and palliative extubation.     Recommendations:  1. Dyspnea  - start Morphine infusion 2mg/hr  - IV morphine 2mg q1H PRN for pain, dyspnea or tachypnea RR>16  2. Agitation  - IV ativan 2mg once (give prior to anticipated palliative extubation)  - IV ativan 1mg q2H PRN agitation, refractory dyspnea or tachypnea RR>16  3. Oral Secretions  - IV glycopyrrolate 0.4mg q6H ATC

## 2022-03-17 NOTE — CHART NOTE - NSCHARTNOTEFT_GEN_A_CORE
Pt assessed by me for unresponsiveness. On exam, the patient did not respond to verbal or physical stimuli. Absent heart sounds. No spontaneous respiration. Absent peripheral pulses. Pupils are fixed and dilated bilaterally. Pt pronounced dead at 19:42 on March 17, 2022. Dr. Jason notified. Next of kin at the bedside with multiple family members.  RN contacted [employer]ation Service.      PITA Humprheys PA-C

## 2022-03-18 LAB
CULTURE RESULTS: SIGNIFICANT CHANGE UP
CULTURE RESULTS: SIGNIFICANT CHANGE UP
SPECIMEN SOURCE: SIGNIFICANT CHANGE UP
SPECIMEN SOURCE: SIGNIFICANT CHANGE UP
SRA INTERP SER-IMP: SIGNIFICANT CHANGE UP

## 2022-03-19 LAB
CULTURE RESULTS: SIGNIFICANT CHANGE UP
SPECIMEN SOURCE: SIGNIFICANT CHANGE UP

## 2022-03-21 LAB
CULTURE RESULTS: SIGNIFICANT CHANGE UP
CULTURE RESULTS: SIGNIFICANT CHANGE UP
SPECIMEN SOURCE: SIGNIFICANT CHANGE UP
SPECIMEN SOURCE: SIGNIFICANT CHANGE UP

## 2022-03-25 NOTE — CHART NOTE - NSCHARTNOTEFT_GEN_A_CORE
palliative care social work note.    Bereavement call made to  patients son Irwin. Support and resources offered.

## 2022-03-25 NOTE — CHART NOTE - NSCHARTNOTESELECT_GEN_ALL_CORE
Expiration Note/Event Note
Neurointerventional Surgery Pre Procedure Note/Event Note
Event Note
Neurointerventional Surgery Post Procedure Note/Event Note

## 2023-01-23 RX ORDER — METOPROLOL TARTRATE 50 MG
1 TABLET ORAL
Qty: 0 | Refills: 0 | DISCHARGE

## 2023-12-04 NOTE — PHYSICAL THERAPY INITIAL EVALUATION ADULT - PERTINENT HX OF CURRENT PROBLEM, REHAB EVAL
Pt. admitted from home due to diarrhea for the past 5 weeks; pt. has also lost a signif. amount of weight. Pt. has h/o HTN, HLD no

## 2024-07-29 NOTE — H&P ADULT - PROBLEM SELECTOR PLAN 5
Urinalysis is negative- pt to follow up for persistent or worsening symptoms 
IMPROVE score 1 2/2 age - no indication for DVT PPx